# Patient Record
Sex: FEMALE | Race: WHITE | NOT HISPANIC OR LATINO | Employment: UNEMPLOYED | URBAN - NONMETROPOLITAN AREA
[De-identification: names, ages, dates, MRNs, and addresses within clinical notes are randomized per-mention and may not be internally consistent; named-entity substitution may affect disease eponyms.]

---
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
## 2021-01-06 ENCOUNTER — POSTPARTUM VISIT (OUTPATIENT)
Dept: OBSTETRICS AND GYNECOLOGY | Facility: CLINIC | Age: 21
End: 2021-01-06

## 2021-01-06 VITALS
SYSTOLIC BLOOD PRESSURE: 118 MMHG | HEIGHT: 63 IN | BODY MASS INDEX: 34.45 KG/M2 | WEIGHT: 194.4 LBS | DIASTOLIC BLOOD PRESSURE: 72 MMHG

## 2021-01-06 DIAGNOSIS — Z30.011 VISIT FOR ORAL CONTRACEPTIVE PRESCRIPTION: ICD-10-CM

## 2021-01-06 PROCEDURE — 0503F POSTPARTUM CARE VISIT: CPT | Performed by: FAMILY MEDICINE

## 2021-01-06 RX ORDER — NORGESTIMATE AND ETHINYL ESTRADIOL 7DAYSX3 LO
1 KIT ORAL DAILY
Qty: 28 TABLET | Refills: 12 | Status: SHIPPED | OUTPATIENT
Start: 2021-01-06 | End: 2021-07-02 | Stop reason: RX

## 2021-01-06 NOTE — PROGRESS NOTES
"4 week postpartum visit      Moriah Lazar is a 20 y.o.  s/p  on 20, who presents today for a 4 week postpartum check.  The patient states she is doing well.  Patient denies postpartum depression.  Menstrual cycles have not resumed. Lochia has stopped. Now formula feeding.  Desires OCP for contraception.  She has not resumed sexual intercourse.  Denies bowel or bladder issues.    PHYSICAL EXAM:    /72   Ht 160 cm (63\")   Wt 88.2 kg (194 lb 6.4 oz)   LMP 2020 (Approximate)   Breastfeeding No   BMI 34.44 kg/m²   GEN: A&Ox3, NAD  Resp: normal effort  Abdomen: +BS, benign, no masses, soft, non-tender.  Bimanual exam: pt deferred   Extremities: No deep calf tenderness.  Postpartum Depression Screening Questionnaire: 0, no treatment indicated.    IMPRESSION/PLAN:  20 y.o.  s/p term , 4 weeks postpartum.  Doing well.     Diagnosis Plan   1. Postpartum follow-up     2. Visit for oral contraceptive prescription         - Recovered nicely from her delivery  - Return to normal physical activity  - Contraception: OCP  - RTC in 2 weeks        This document has been electronically signed by Moriah Garcia MD on 2021 08:57 CST      "

## 2021-01-20 ENCOUNTER — POSTPARTUM VISIT (OUTPATIENT)
Dept: OBSTETRICS AND GYNECOLOGY | Facility: CLINIC | Age: 21
End: 2021-01-20

## 2021-01-20 VITALS
WEIGHT: 185.4 LBS | OXYGEN SATURATION: 99 % | SYSTOLIC BLOOD PRESSURE: 120 MMHG | RESPIRATION RATE: 20 BRPM | HEIGHT: 63 IN | HEART RATE: 90 BPM | DIASTOLIC BLOOD PRESSURE: 74 MMHG | BODY MASS INDEX: 32.85 KG/M2

## 2021-01-20 PROCEDURE — 0503F POSTPARTUM CARE VISIT: CPT | Performed by: FAMILY MEDICINE

## 2021-01-20 NOTE — PROGRESS NOTES
"6 week postpartum visit      Moriah Lazar is a 20 y.o.  s/p  on 20, who presents today for a 6 week postpartum check.  The patient states she is doing well, baby is now sleeping better at night.  Patient denies postpartum depression.  Menstrual cycles have not resumed.  Formula feeding.  Desires OCP for contraception.  She has not resumed sexual intercourse.  Denies bowel or bladder issues.    PHYSICAL EXAM:    /74 (BP Location: Left arm, Patient Position: Sitting, Cuff Size: Adult)   Pulse 90   Resp 20   Ht 160 cm (63\")   Wt 84.1 kg (185 lb 6.4 oz)   LMP 2020 (Approximate)   SpO2 99%   BMI 32.84 kg/m²   GEN: A&Ox3, NAD  Resp: normal effort  Abdomen: +BS, benign, no masses, soft, non-tender.  Bimanual exam: pt declined   Extremities: No deep calf tenderness.    IMPRESSION/PLAN:  20 y.o.  s/p term , 6 weeks postpartum.  Doing well.     Diagnosis Plan   1. Postpartum follow-up         - Recovered nicely from her delivery  - Return to normal physical activity  - Contraception: OCP  - Resume annual gynecological examinations        This document has been electronically signed by Moriah Garcia MD on 2021 10:40 CST        "

## 2021-05-03 ENCOUNTER — APPOINTMENT (OUTPATIENT)
Dept: MRI IMAGING | Facility: HOSPITAL | Age: 21
End: 2021-05-03

## 2021-05-03 ENCOUNTER — HOSPITAL ENCOUNTER (INPATIENT)
Facility: HOSPITAL | Age: 21
LOS: 3 days | Discharge: HOME | End: 2021-05-06
Attending: NEUROLOGICAL SURGERY | Admitting: NEUROLOGICAL SURGERY

## 2021-05-03 ENCOUNTER — HOSPITAL ENCOUNTER (EMERGENCY)
Facility: HOSPITAL | Age: 21
Discharge: ANOTHER HOSPITAL | End: 2021-05-03
Attending: EMERGENCY MEDICINE | Admitting: EMERGENCY MEDICINE

## 2021-05-03 VITALS
HEART RATE: 74 BPM | TEMPERATURE: 98 F | HEIGHT: 64 IN | SYSTOLIC BLOOD PRESSURE: 123 MMHG | BODY MASS INDEX: 32.33 KG/M2 | DIASTOLIC BLOOD PRESSURE: 69 MMHG | RESPIRATION RATE: 18 BRPM | OXYGEN SATURATION: 98 %

## 2021-05-03 DIAGNOSIS — M48.00 SPINAL STENOSIS, UNSPECIFIED SPINAL REGION: Primary | ICD-10-CM

## 2021-05-03 DIAGNOSIS — M54.42 BILATERAL LOW BACK PAIN WITH BILATERAL SCIATICA, UNSPECIFIED CHRONICITY: ICD-10-CM

## 2021-05-03 DIAGNOSIS — M54.41 BILATERAL LOW BACK PAIN WITH BILATERAL SCIATICA, UNSPECIFIED CHRONICITY: ICD-10-CM

## 2021-05-03 DIAGNOSIS — M51.26 LUMBAR DISC HERNIATION: Primary | ICD-10-CM

## 2021-05-03 DIAGNOSIS — Z78.9 DECREASED ACTIVITIES OF DAILY LIVING (ADL): ICD-10-CM

## 2021-05-03 DIAGNOSIS — Z74.09 IMPAIRED MOBILITY: ICD-10-CM

## 2021-05-03 DIAGNOSIS — R20.2 PARESTHESIA OF BOTH LOWER EXTREMITIES: ICD-10-CM

## 2021-05-03 LAB
ALBUMIN SERPL-MCNC: 4.1 G/DL (ref 3.5–5.2)
ALBUMIN/GLOB SERPL: 1.5 G/DL
ALP SERPL-CCNC: 106 U/L (ref 39–117)
ALT SERPL W P-5'-P-CCNC: 16 U/L (ref 1–33)
ANION GAP SERPL CALCULATED.3IONS-SCNC: 11 MMOL/L (ref 5–15)
AST SERPL-CCNC: 17 U/L (ref 1–32)
B-HCG UR QL: NEGATIVE
BASOPHILS # BLD AUTO: 0.05 10*3/MM3 (ref 0–0.2)
BASOPHILS NFR BLD AUTO: 0.5 % (ref 0–1.5)
BILIRUB SERPL-MCNC: 0.3 MG/DL (ref 0–1.2)
BILIRUB UR QL STRIP: NEGATIVE
BUN SERPL-MCNC: 10 MG/DL (ref 6–20)
BUN/CREAT SERPL: 14.5 (ref 7–25)
CALCIUM SPEC-SCNC: 9.4 MG/DL (ref 8.6–10.5)
CHLORIDE SERPL-SCNC: 104 MMOL/L (ref 98–107)
CLARITY UR: CLEAR
CO2 SERPL-SCNC: 20 MMOL/L (ref 22–29)
COLOR UR: YELLOW
CREAT SERPL-MCNC: 0.69 MG/DL (ref 0.57–1)
DEPRECATED RDW RBC AUTO: 41.1 FL (ref 37–54)
EOSINOPHIL # BLD AUTO: 0.19 10*3/MM3 (ref 0–0.4)
EOSINOPHIL NFR BLD AUTO: 1.9 % (ref 0.3–6.2)
ERYTHROCYTE [DISTWIDTH] IN BLOOD BY AUTOMATED COUNT: 14.5 % (ref 12.3–15.4)
FLUAV RNA RESP QL NAA+PROBE: NOT DETECTED
FLUBV RNA RESP QL NAA+PROBE: NOT DETECTED
GFR SERPL CREATININE-BSD FRML MDRD: 108 ML/MIN/1.73
GLOBULIN UR ELPH-MCNC: 2.8 GM/DL
GLUCOSE SERPL-MCNC: 91 MG/DL (ref 65–99)
GLUCOSE UR STRIP-MCNC: NEGATIVE MG/DL
HCT VFR BLD AUTO: 40 % (ref 34–46.6)
HGB BLD-MCNC: 13.3 G/DL (ref 12–15.9)
HGB UR QL STRIP.AUTO: NEGATIVE
HOLD SPECIMEN: NORMAL
IMM GRANULOCYTES # BLD AUTO: 0.03 10*3/MM3 (ref 0–0.05)
IMM GRANULOCYTES NFR BLD AUTO: 0.3 % (ref 0–0.5)
KETONES UR QL STRIP: NEGATIVE
LEUKOCYTE ESTERASE UR QL STRIP.AUTO: NEGATIVE
LYMPHOCYTES # BLD AUTO: 3 10*3/MM3 (ref 0.7–3.1)
LYMPHOCYTES NFR BLD AUTO: 30.7 % (ref 19.6–45.3)
MCH RBC QN AUTO: 26.1 PG (ref 26.6–33)
MCHC RBC AUTO-ENTMCNC: 33.3 G/DL (ref 31.5–35.7)
MCV RBC AUTO: 78.4 FL (ref 79–97)
MONOCYTES # BLD AUTO: 0.75 10*3/MM3 (ref 0.1–0.9)
MONOCYTES NFR BLD AUTO: 7.7 % (ref 5–12)
NEUTROPHILS NFR BLD AUTO: 5.74 10*3/MM3 (ref 1.7–7)
NEUTROPHILS NFR BLD AUTO: 58.9 % (ref 42.7–76)
NITRITE UR QL STRIP: NEGATIVE
NRBC BLD AUTO-RTO: 0 /100 WBC (ref 0–0.2)
PH UR STRIP.AUTO: 6 [PH] (ref 5–9)
PLATELET # BLD AUTO: 316 10*3/MM3 (ref 140–450)
PMV BLD AUTO: 9.6 FL (ref 6–12)
POTASSIUM SERPL-SCNC: 3.9 MMOL/L (ref 3.5–5.2)
PROT SERPL-MCNC: 6.9 G/DL (ref 6–8.5)
PROT UR QL STRIP: NEGATIVE
RBC # BLD AUTO: 5.1 10*6/MM3 (ref 3.77–5.28)
SARS-COV-2 RNA RESP QL NAA+PROBE: NOT DETECTED
SODIUM SERPL-SCNC: 135 MMOL/L (ref 136–145)
SP GR UR STRIP: 1.03 (ref 1–1.03)
UROBILINOGEN UR QL STRIP: NORMAL
WBC # BLD AUTO: 9.76 10*3/MM3 (ref 3.4–10.8)
WHOLE BLOOD HOLD SPECIMEN: NORMAL

## 2021-05-03 PROCEDURE — 96376 TX/PRO/DX INJ SAME DRUG ADON: CPT

## 2021-05-03 PROCEDURE — 25010000002 DIPHENHYDRAMINE PER 50 MG

## 2021-05-03 PROCEDURE — 25010000002 ONDANSETRON PER 1 MG: Performed by: EMERGENCY MEDICINE

## 2021-05-03 PROCEDURE — 25010000002 DEXAMETHASONE PER 1 MG: Performed by: NURSE PRACTITIONER

## 2021-05-03 PROCEDURE — 80053 COMPREHEN METABOLIC PANEL: CPT | Performed by: EMERGENCY MEDICINE

## 2021-05-03 PROCEDURE — 81003 URINALYSIS AUTO W/O SCOPE: CPT | Performed by: EMERGENCY MEDICINE

## 2021-05-03 PROCEDURE — 81025 URINE PREGNANCY TEST: CPT | Performed by: EMERGENCY MEDICINE

## 2021-05-03 PROCEDURE — 25010000002 KETOROLAC TROMETHAMINE PER 15 MG: Performed by: EMERGENCY MEDICINE

## 2021-05-03 PROCEDURE — 94799 UNLISTED PULMONARY SVC/PX: CPT

## 2021-05-03 PROCEDURE — 99223 1ST HOSP IP/OBS HIGH 75: CPT | Performed by: NURSE PRACTITIONER

## 2021-05-03 PROCEDURE — 25010000002 HYDROMORPHONE 1 MG/ML SOLUTION: Performed by: EMERGENCY MEDICINE

## 2021-05-03 PROCEDURE — 25010000002 METHYLPREDNISOLONE PER 125 MG: Performed by: EMERGENCY MEDICINE

## 2021-05-03 PROCEDURE — 96374 THER/PROPH/DIAG INJ IV PUSH: CPT

## 2021-05-03 PROCEDURE — 96375 TX/PRO/DX INJ NEW DRUG ADDON: CPT

## 2021-05-03 PROCEDURE — 85025 COMPLETE CBC W/AUTO DIFF WBC: CPT | Performed by: EMERGENCY MEDICINE

## 2021-05-03 PROCEDURE — 25010000002 MORPHINE SULFATE (PF) 2 MG/ML SOLUTION: Performed by: NEUROLOGICAL SURGERY

## 2021-05-03 PROCEDURE — 72148 MRI LUMBAR SPINE W/O DYE: CPT

## 2021-05-03 PROCEDURE — 99284 EMERGENCY DEPT VISIT MOD MDM: CPT

## 2021-05-03 PROCEDURE — 87636 SARSCOV2 & INF A&B AMP PRB: CPT | Performed by: EMERGENCY MEDICINE

## 2021-05-03 RX ORDER — HYDROCODONE BITARTRATE AND ACETAMINOPHEN 7.5; 325 MG/1; MG/1
1 TABLET ORAL EVERY 4 HOURS PRN
Status: DISCONTINUED | OUTPATIENT
Start: 2021-05-03 | End: 2021-05-06 | Stop reason: HOSPADM

## 2021-05-03 RX ORDER — CARISOPRODOL 350 MG/1
350 TABLET ORAL EVERY 8 HOURS PRN
Status: DISCONTINUED | OUTPATIENT
Start: 2021-05-03 | End: 2021-05-06 | Stop reason: HOSPADM

## 2021-05-03 RX ORDER — ACETAMINOPHEN 160 MG/5ML
650 SOLUTION ORAL EVERY 4 HOURS PRN
Status: DISCONTINUED | OUTPATIENT
Start: 2021-05-03 | End: 2021-05-06 | Stop reason: HOSPADM

## 2021-05-03 RX ORDER — METHYLPREDNISOLONE SODIUM SUCCINATE 125 MG/2ML
125 INJECTION, POWDER, LYOPHILIZED, FOR SOLUTION INTRAMUSCULAR; INTRAVENOUS ONCE
Status: COMPLETED | OUTPATIENT
Start: 2021-05-03 | End: 2021-05-03

## 2021-05-03 RX ORDER — LANOLIN ALCOHOL/MO/W.PET/CERES
6 CREAM (GRAM) TOPICAL NIGHTLY PRN
Status: DISCONTINUED | OUTPATIENT
Start: 2021-05-03 | End: 2021-05-06 | Stop reason: HOSPADM

## 2021-05-03 RX ORDER — DIPHENHYDRAMINE HCL 25 MG
25 CAPSULE ORAL EVERY 6 HOURS PRN
Status: DISCONTINUED | OUTPATIENT
Start: 2021-05-03 | End: 2021-05-06 | Stop reason: HOSPADM

## 2021-05-03 RX ORDER — PRENATAL VIT/IRON FUM/FOLIC AC 27MG-0.8MG
1 TABLET ORAL DAILY
Status: DISCONTINUED | OUTPATIENT
Start: 2021-05-03 | End: 2021-05-06 | Stop reason: HOSPADM

## 2021-05-03 RX ORDER — ONDANSETRON 2 MG/ML
4 INJECTION INTRAMUSCULAR; INTRAVENOUS EVERY 6 HOURS PRN
Status: DISCONTINUED | OUTPATIENT
Start: 2021-05-03 | End: 2021-05-03 | Stop reason: SDUPTHER

## 2021-05-03 RX ORDER — KETOROLAC TROMETHAMINE 30 MG/ML
30 INJECTION, SOLUTION INTRAMUSCULAR; INTRAVENOUS ONCE
Status: COMPLETED | OUTPATIENT
Start: 2021-05-03 | End: 2021-05-03

## 2021-05-03 RX ORDER — NICOTINE 21 MG/24HR
1 PATCH, TRANSDERMAL 24 HOURS TRANSDERMAL
Status: DISCONTINUED | OUTPATIENT
Start: 2021-05-03 | End: 2021-05-06 | Stop reason: HOSPADM

## 2021-05-03 RX ORDER — MORPHINE SULFATE 2 MG/ML
1 INJECTION, SOLUTION INTRAMUSCULAR; INTRAVENOUS EVERY 4 HOURS PRN
Status: DISCONTINUED | OUTPATIENT
Start: 2021-05-03 | End: 2021-05-06 | Stop reason: ALTCHOICE

## 2021-05-03 RX ORDER — CHOLECALCIFEROL (VITAMIN D3) 125 MCG
5 CAPSULE ORAL NIGHTLY PRN
COMMUNITY
End: 2021-11-18

## 2021-05-03 RX ORDER — OXYCODONE AND ACETAMINOPHEN 10; 325 MG/1; MG/1
1 TABLET ORAL EVERY 4 HOURS PRN
Status: DISCONTINUED | OUTPATIENT
Start: 2021-05-03 | End: 2021-05-06 | Stop reason: HOSPADM

## 2021-05-03 RX ORDER — ACETAMINOPHEN 325 MG/1
650 TABLET ORAL EVERY 4 HOURS PRN
Status: DISCONTINUED | OUTPATIENT
Start: 2021-05-03 | End: 2021-05-06 | Stop reason: HOSPADM

## 2021-05-03 RX ORDER — ACETAMINOPHEN 325 MG/1
650 TABLET ORAL EVERY 4 HOURS PRN
Status: DISCONTINUED | OUTPATIENT
Start: 2021-05-03 | End: 2021-05-03 | Stop reason: SDUPTHER

## 2021-05-03 RX ORDER — SODIUM CHLORIDE 9 MG/ML
100 INJECTION, SOLUTION INTRAVENOUS CONTINUOUS
Status: DISCONTINUED | OUTPATIENT
Start: 2021-05-03 | End: 2021-05-06 | Stop reason: HOSPADM

## 2021-05-03 RX ORDER — KETOROLAC TROMETHAMINE 30 MG/ML
30 INJECTION, SOLUTION INTRAMUSCULAR; INTRAVENOUS EVERY 6 HOURS PRN
Status: DISCONTINUED | OUTPATIENT
Start: 2021-05-03 | End: 2021-05-06 | Stop reason: HOSPADM

## 2021-05-03 RX ORDER — ACETAMINOPHEN 650 MG/1
650 SUPPOSITORY RECTAL EVERY 4 HOURS PRN
Status: DISCONTINUED | OUTPATIENT
Start: 2021-05-03 | End: 2021-05-03 | Stop reason: SDUPTHER

## 2021-05-03 RX ORDER — SODIUM CHLORIDE 0.9 % (FLUSH) 0.9 %
10 SYRINGE (ML) INJECTION AS NEEDED
Status: DISCONTINUED | OUTPATIENT
Start: 2021-05-03 | End: 2021-05-03 | Stop reason: HOSPADM

## 2021-05-03 RX ORDER — ACETAMINOPHEN 160 MG/5ML
650 SOLUTION ORAL EVERY 4 HOURS PRN
Status: DISCONTINUED | OUTPATIENT
Start: 2021-05-03 | End: 2021-05-03 | Stop reason: SDUPTHER

## 2021-05-03 RX ORDER — DIPHENHYDRAMINE HYDROCHLORIDE 50 MG/ML
25 INJECTION INTRAMUSCULAR; INTRAVENOUS ONCE
Status: COMPLETED | OUTPATIENT
Start: 2021-05-03 | End: 2021-05-03

## 2021-05-03 RX ORDER — SODIUM CHLORIDE 0.9 % (FLUSH) 0.9 %
10 SYRINGE (ML) INJECTION AS NEEDED
Status: DISCONTINUED | OUTPATIENT
Start: 2021-05-03 | End: 2021-05-06 | Stop reason: HOSPADM

## 2021-05-03 RX ORDER — SODIUM CHLORIDE 0.9 % (FLUSH) 0.9 %
10 SYRINGE (ML) INJECTION EVERY 12 HOURS SCHEDULED
Status: DISCONTINUED | OUTPATIENT
Start: 2021-05-03 | End: 2021-05-06 | Stop reason: SDUPTHER

## 2021-05-03 RX ORDER — SODIUM CHLORIDE 9 MG/ML
75 INJECTION, SOLUTION INTRAVENOUS CONTINUOUS
Status: DISCONTINUED | OUTPATIENT
Start: 2021-05-03 | End: 2021-05-06 | Stop reason: SDUPTHER

## 2021-05-03 RX ORDER — DEXAMETHASONE SODIUM PHOSPHATE 4 MG/ML
6 INJECTION, SOLUTION INTRA-ARTICULAR; INTRALESIONAL; INTRAMUSCULAR; INTRAVENOUS; SOFT TISSUE EVERY 6 HOURS
Status: DISCONTINUED | OUTPATIENT
Start: 2021-05-03 | End: 2021-05-06 | Stop reason: HOSPADM

## 2021-05-03 RX ORDER — NALOXONE HCL 0.4 MG/ML
0.4 VIAL (ML) INJECTION
Status: DISCONTINUED | OUTPATIENT
Start: 2021-05-03 | End: 2021-05-06 | Stop reason: HOSPADM

## 2021-05-03 RX ORDER — ACETAMINOPHEN 650 MG/1
650 SUPPOSITORY RECTAL EVERY 4 HOURS PRN
Status: DISCONTINUED | OUTPATIENT
Start: 2021-05-03 | End: 2021-05-06 | Stop reason: HOSPADM

## 2021-05-03 RX ORDER — DIPHENHYDRAMINE HYDROCHLORIDE 50 MG/ML
INJECTION INTRAMUSCULAR; INTRAVENOUS
Status: COMPLETED
Start: 2021-05-03 | End: 2021-05-03

## 2021-05-03 RX ORDER — SODIUM CHLORIDE 0.9 % (FLUSH) 0.9 %
10 SYRINGE (ML) INJECTION AS NEEDED
Status: DISCONTINUED | OUTPATIENT
Start: 2021-05-03 | End: 2021-05-06 | Stop reason: SDUPTHER

## 2021-05-03 RX ORDER — ONDANSETRON 2 MG/ML
4 INJECTION INTRAMUSCULAR; INTRAVENOUS EVERY 6 HOURS PRN
Status: DISCONTINUED | OUTPATIENT
Start: 2021-05-03 | End: 2021-05-06 | Stop reason: SDUPTHER

## 2021-05-03 RX ORDER — NALOXONE HCL 0.4 MG/ML
0.4 VIAL (ML) INJECTION
Status: DISCONTINUED | OUTPATIENT
Start: 2021-05-03 | End: 2021-05-06 | Stop reason: SDUPTHER

## 2021-05-03 RX ORDER — MORPHINE SULFATE 2 MG/ML
2 INJECTION, SOLUTION INTRAMUSCULAR; INTRAVENOUS EVERY 4 HOURS PRN
Status: DISCONTINUED | OUTPATIENT
Start: 2021-05-03 | End: 2021-05-06 | Stop reason: ALTCHOICE

## 2021-05-03 RX ORDER — SODIUM CHLORIDE 0.9 % (FLUSH) 0.9 %
10 SYRINGE (ML) INJECTION EVERY 12 HOURS SCHEDULED
Status: DISCONTINUED | OUTPATIENT
Start: 2021-05-03 | End: 2021-05-06 | Stop reason: HOSPADM

## 2021-05-03 RX ORDER — ONDANSETRON 2 MG/ML
4 INJECTION INTRAMUSCULAR; INTRAVENOUS ONCE
Status: COMPLETED | OUTPATIENT
Start: 2021-05-03 | End: 2021-05-03

## 2021-05-03 RX ORDER — DOCUSATE SODIUM 100 MG/1
100 CAPSULE, LIQUID FILLED ORAL 2 TIMES DAILY
Status: DISCONTINUED | OUTPATIENT
Start: 2021-05-03 | End: 2021-05-06 | Stop reason: HOSPADM

## 2021-05-03 RX ORDER — DEXAMETHASONE SODIUM PHOSPHATE 10 MG/ML
6 INJECTION, SOLUTION INTRAMUSCULAR; INTRAVENOUS EVERY 6 HOURS
Status: DISCONTINUED | OUTPATIENT
Start: 2021-05-03 | End: 2021-05-03 | Stop reason: SDUPTHER

## 2021-05-03 RX ORDER — ONDANSETRON 4 MG/1
4 TABLET, FILM COATED ORAL EVERY 6 HOURS PRN
Status: DISCONTINUED | OUTPATIENT
Start: 2021-05-03 | End: 2021-05-06 | Stop reason: HOSPADM

## 2021-05-03 RX ADMIN — ONDANSETRON HYDROCHLORIDE 4 MG: 2 INJECTION, SOLUTION INTRAMUSCULAR; INTRAVENOUS at 09:37

## 2021-05-03 RX ADMIN — PRENATAL VIT W/ FE FUMARATE-FA TAB 27-0.8 MG 1 TABLET: 27-0.8 TAB at 16:46

## 2021-05-03 RX ADMIN — SODIUM CHLORIDE, PRESERVATIVE FREE 10 ML: 5 INJECTION INTRAVENOUS at 20:28

## 2021-05-03 RX ADMIN — METHYLPREDNISOLONE SODIUM SUCCINATE 125 MG: 125 INJECTION, POWDER, FOR SOLUTION INTRAMUSCULAR; INTRAVENOUS at 11:30

## 2021-05-03 RX ADMIN — DEXAMETHASONE SODIUM PHOSPHATE 6 MG: 4 INJECTION, SOLUTION INTRA-ARTICULAR; INTRALESIONAL; INTRAMUSCULAR; INTRAVENOUS; SOFT TISSUE at 23:59

## 2021-05-03 RX ADMIN — HYDROMORPHONE HYDROCHLORIDE 1 MG: 1 INJECTION, SOLUTION INTRAMUSCULAR; INTRAVENOUS; SUBCUTANEOUS at 09:37

## 2021-05-03 RX ADMIN — HYDROMORPHONE HYDROCHLORIDE 1 MG: 1 INJECTION, SOLUTION INTRAMUSCULAR; INTRAVENOUS; SUBCUTANEOUS at 11:20

## 2021-05-03 RX ADMIN — NICOTINE 1 PATCH: 21 PATCH, EXTENDED RELEASE TRANSDERMAL at 16:49

## 2021-05-03 RX ADMIN — OXYCODONE HYDROCHLORIDE AND ACETAMINOPHEN 1 TABLET: 10; 325 TABLET ORAL at 20:36

## 2021-05-03 RX ADMIN — DIPHENHYDRAMINE HYDROCHLORIDE 25 MG: 50 INJECTION INTRAMUSCULAR; INTRAVENOUS at 12:48

## 2021-05-03 RX ADMIN — DEXAMETHASONE SODIUM PHOSPHATE 6 MG: 4 INJECTION, SOLUTION INTRA-ARTICULAR; INTRALESIONAL; INTRAMUSCULAR; INTRAVENOUS; SOFT TISSUE at 16:47

## 2021-05-03 RX ADMIN — DOCUSATE SODIUM 100 MG: 100 CAPSULE ORAL at 20:28

## 2021-05-03 RX ADMIN — KETOROLAC TROMETHAMINE 30 MG: 30 INJECTION, SOLUTION INTRAMUSCULAR; INTRAVENOUS at 11:19

## 2021-05-03 RX ADMIN — SODIUM CHLORIDE 75 ML/HR: 9 INJECTION, SOLUTION INTRAVENOUS at 16:02

## 2021-05-03 RX ADMIN — HYDROCODONE BITARTRATE AND ACETAMINOPHEN 1 TABLET: 7.5; 325 TABLET ORAL at 16:02

## 2021-05-03 RX ADMIN — MORPHINE SULFATE 2 MG: 2 INJECTION, SOLUTION INTRAMUSCULAR; INTRAVENOUS at 16:56

## 2021-05-03 RX ADMIN — CARISOPRODOL 350 MG: 350 TABLET ORAL at 16:55

## 2021-05-04 PROCEDURE — 97166 OT EVAL MOD COMPLEX 45 MIN: CPT

## 2021-05-04 PROCEDURE — 25010000002 DEXAMETHASONE PER 1 MG: Performed by: NURSE PRACTITIONER

## 2021-05-04 PROCEDURE — 99231 SBSQ HOSP IP/OBS SF/LOW 25: CPT | Performed by: NEUROLOGICAL SURGERY

## 2021-05-04 PROCEDURE — 97530 THERAPEUTIC ACTIVITIES: CPT

## 2021-05-04 PROCEDURE — 97161 PT EVAL LOW COMPLEX 20 MIN: CPT | Performed by: PHYSICAL THERAPIST

## 2021-05-04 PROCEDURE — 63710000001 DIPHENHYDRAMINE PER 50 MG: Performed by: NEUROLOGICAL SURGERY

## 2021-05-04 PROCEDURE — 25010000002 KETOROLAC TROMETHAMINE PER 15 MG: Performed by: NEUROLOGICAL SURGERY

## 2021-05-04 RX ADMIN — Medication 6 MG: at 21:14

## 2021-05-04 RX ADMIN — DEXAMETHASONE SODIUM PHOSPHATE 6 MG: 4 INJECTION, SOLUTION INTRA-ARTICULAR; INTRALESIONAL; INTRAMUSCULAR; INTRAVENOUS; SOFT TISSUE at 11:22

## 2021-05-04 RX ADMIN — DOCUSATE SODIUM 100 MG: 100 CAPSULE ORAL at 08:09

## 2021-05-04 RX ADMIN — OXYCODONE HYDROCHLORIDE AND ACETAMINOPHEN 1 TABLET: 10; 325 TABLET ORAL at 10:13

## 2021-05-04 RX ADMIN — DEXAMETHASONE SODIUM PHOSPHATE 6 MG: 4 INJECTION, SOLUTION INTRA-ARTICULAR; INTRALESIONAL; INTRAMUSCULAR; INTRAVENOUS; SOFT TISSUE at 22:27

## 2021-05-04 RX ADMIN — SODIUM CHLORIDE 100 ML/HR: 9 INJECTION, SOLUTION INTRAVENOUS at 04:23

## 2021-05-04 RX ADMIN — KETOROLAC TROMETHAMINE 30 MG: 30 INJECTION, SOLUTION INTRAMUSCULAR; INTRAVENOUS at 16:09

## 2021-05-04 RX ADMIN — OXYCODONE HYDROCHLORIDE AND ACETAMINOPHEN 1 TABLET: 10; 325 TABLET ORAL at 05:38

## 2021-05-04 RX ADMIN — OXYCODONE HYDROCHLORIDE AND ACETAMINOPHEN 1 TABLET: 10; 325 TABLET ORAL at 21:13

## 2021-05-04 RX ADMIN — PRENATAL VIT W/ FE FUMARATE-FA TAB 27-0.8 MG 1 TABLET: 27-0.8 TAB at 08:09

## 2021-05-04 RX ADMIN — DEXAMETHASONE SODIUM PHOSPHATE 6 MG: 4 INJECTION, SOLUTION INTRA-ARTICULAR; INTRALESIONAL; INTRAMUSCULAR; INTRAVENOUS; SOFT TISSUE at 17:35

## 2021-05-04 RX ADMIN — CARISOPRODOL 350 MG: 350 TABLET ORAL at 17:35

## 2021-05-04 RX ADMIN — DOCUSATE SODIUM 100 MG: 100 CAPSULE ORAL at 21:13

## 2021-05-04 RX ADMIN — OXYCODONE HYDROCHLORIDE AND ACETAMINOPHEN 1 TABLET: 10; 325 TABLET ORAL at 14:13

## 2021-05-04 RX ADMIN — KETOROLAC TROMETHAMINE 30 MG: 30 INJECTION, SOLUTION INTRAMUSCULAR; INTRAVENOUS at 04:28

## 2021-05-04 RX ADMIN — DEXAMETHASONE SODIUM PHOSPHATE 6 MG: 4 INJECTION, SOLUTION INTRA-ARTICULAR; INTRALESIONAL; INTRAMUSCULAR; INTRAVENOUS; SOFT TISSUE at 04:28

## 2021-05-04 RX ADMIN — CARISOPRODOL 350 MG: 350 TABLET ORAL at 08:49

## 2021-05-04 RX ADMIN — DIPHENHYDRAMINE HYDROCHLORIDE 25 MG: 25 CAPSULE ORAL at 22:27

## 2021-05-05 PROCEDURE — 25010000002 DEXAMETHASONE PER 1 MG: Performed by: NURSE PRACTITIONER

## 2021-05-05 PROCEDURE — 97530 THERAPEUTIC ACTIVITIES: CPT

## 2021-05-05 PROCEDURE — 99231 SBSQ HOSP IP/OBS SF/LOW 25: CPT | Performed by: NURSE PRACTITIONER

## 2021-05-05 PROCEDURE — 97535 SELF CARE MNGMENT TRAINING: CPT

## 2021-05-05 PROCEDURE — 25010000002 KETOROLAC TROMETHAMINE PER 15 MG: Performed by: NEUROLOGICAL SURGERY

## 2021-05-05 RX ORDER — ALPRAZOLAM 0.25 MG/1
0.25 TABLET ORAL 3 TIMES DAILY PRN
Status: DISCONTINUED | OUTPATIENT
Start: 2021-05-05 | End: 2021-05-06 | Stop reason: HOSPADM

## 2021-05-05 RX ADMIN — KETOROLAC TROMETHAMINE 30 MG: 30 INJECTION, SOLUTION INTRAMUSCULAR; INTRAVENOUS at 03:29

## 2021-05-05 RX ADMIN — DOCUSATE SODIUM 100 MG: 100 CAPSULE ORAL at 23:23

## 2021-05-05 RX ADMIN — PRENATAL VIT W/ FE FUMARATE-FA TAB 27-0.8 MG 1 TABLET: 27-0.8 TAB at 08:29

## 2021-05-05 RX ADMIN — OXYCODONE HYDROCHLORIDE AND ACETAMINOPHEN 1 TABLET: 10; 325 TABLET ORAL at 13:17

## 2021-05-05 RX ADMIN — NICOTINE 1 PATCH: 21 PATCH, EXTENDED RELEASE TRANSDERMAL at 08:29

## 2021-05-05 RX ADMIN — DEXAMETHASONE SODIUM PHOSPHATE 6 MG: 4 INJECTION, SOLUTION INTRA-ARTICULAR; INTRALESIONAL; INTRAMUSCULAR; INTRAVENOUS; SOFT TISSUE at 23:22

## 2021-05-05 RX ADMIN — ALPRAZOLAM 0.25 MG: 0.25 TABLET ORAL at 08:29

## 2021-05-05 RX ADMIN — OXYCODONE HYDROCHLORIDE AND ACETAMINOPHEN 1 TABLET: 10; 325 TABLET ORAL at 19:03

## 2021-05-05 RX ADMIN — Medication 6 MG: at 23:22

## 2021-05-05 RX ADMIN — DEXAMETHASONE SODIUM PHOSPHATE 6 MG: 4 INJECTION, SOLUTION INTRA-ARTICULAR; INTRALESIONAL; INTRAMUSCULAR; INTRAVENOUS; SOFT TISSUE at 05:11

## 2021-05-05 RX ADMIN — CARISOPRODOL 350 MG: 350 TABLET ORAL at 03:30

## 2021-05-05 RX ADMIN — OXYCODONE HYDROCHLORIDE AND ACETAMINOPHEN 1 TABLET: 10; 325 TABLET ORAL at 05:11

## 2021-05-05 RX ADMIN — DEXAMETHASONE SODIUM PHOSPHATE 6 MG: 4 INJECTION, SOLUTION INTRA-ARTICULAR; INTRALESIONAL; INTRAMUSCULAR; INTRAVENOUS; SOFT TISSUE at 10:58

## 2021-05-05 RX ADMIN — CARISOPRODOL 350 MG: 350 TABLET ORAL at 13:17

## 2021-05-05 RX ADMIN — DOCUSATE SODIUM 100 MG: 100 CAPSULE ORAL at 08:29

## 2021-05-05 RX ADMIN — DEXAMETHASONE SODIUM PHOSPHATE 6 MG: 4 INJECTION, SOLUTION INTRA-ARTICULAR; INTRALESIONAL; INTRAMUSCULAR; INTRAVENOUS; SOFT TISSUE at 17:36

## 2021-05-05 RX ADMIN — ALPRAZOLAM 0.25 MG: 0.25 TABLET ORAL at 23:23

## 2021-05-05 RX ADMIN — OXYCODONE HYDROCHLORIDE AND ACETAMINOPHEN 1 TABLET: 10; 325 TABLET ORAL at 23:22

## 2021-05-05 RX ADMIN — OXYCODONE HYDROCHLORIDE AND ACETAMINOPHEN 1 TABLET: 10; 325 TABLET ORAL at 09:20

## 2021-05-06 VITALS
TEMPERATURE: 97.9 F | BODY MASS INDEX: 32.33 KG/M2 | WEIGHT: 189.4 LBS | HEART RATE: 57 BPM | OXYGEN SATURATION: 95 % | SYSTOLIC BLOOD PRESSURE: 110 MMHG | HEIGHT: 64 IN | RESPIRATION RATE: 16 BRPM | DIASTOLIC BLOOD PRESSURE: 64 MMHG

## 2021-05-06 PROCEDURE — 25010000002 DEXAMETHASONE PER 1 MG: Performed by: NURSE PRACTITIONER

## 2021-05-06 PROCEDURE — 99239 HOSP IP/OBS DSCHRG MGMT >30: CPT | Performed by: NURSE PRACTITIONER

## 2021-05-06 PROCEDURE — 97116 GAIT TRAINING THERAPY: CPT

## 2021-05-06 PROCEDURE — 97530 THERAPEUTIC ACTIVITIES: CPT

## 2021-05-06 RX ORDER — OXYCODONE AND ACETAMINOPHEN 10; 325 MG/1; MG/1
1 TABLET ORAL EVERY 6 HOURS PRN
Qty: 60 TABLET | Refills: 0 | Status: SHIPPED | OUTPATIENT
Start: 2021-05-06 | End: 2021-05-20

## 2021-05-06 RX ORDER — CYCLOBENZAPRINE HCL 10 MG
10 TABLET ORAL 3 TIMES DAILY PRN
Qty: 90 TABLET | Refills: 2 | Status: SHIPPED | OUTPATIENT
Start: 2021-05-06 | End: 2021-11-02 | Stop reason: SDUPTHER

## 2021-05-06 RX ORDER — METHYLPREDNISOLONE 4 MG/1
TABLET ORAL
Qty: 21 EACH | Refills: 0 | Status: SHIPPED | OUTPATIENT
Start: 2021-05-06 | End: 2021-05-26

## 2021-05-06 RX ADMIN — OXYCODONE HYDROCHLORIDE AND ACETAMINOPHEN 1 TABLET: 10; 325 TABLET ORAL at 07:01

## 2021-05-06 RX ADMIN — DEXAMETHASONE SODIUM PHOSPHATE 6 MG: 4 INJECTION, SOLUTION INTRA-ARTICULAR; INTRALESIONAL; INTRAMUSCULAR; INTRAVENOUS; SOFT TISSUE at 07:01

## 2021-05-06 RX ADMIN — DOCUSATE SODIUM 100 MG: 100 CAPSULE ORAL at 08:02

## 2021-05-06 RX ADMIN — SODIUM CHLORIDE, PRESERVATIVE FREE 10 ML: 5 INJECTION INTRAVENOUS at 08:02

## 2021-05-06 RX ADMIN — DEXAMETHASONE SODIUM PHOSPHATE 6 MG: 4 INJECTION, SOLUTION INTRA-ARTICULAR; INTRALESIONAL; INTRAMUSCULAR; INTRAVENOUS; SOFT TISSUE at 10:38

## 2021-05-06 RX ADMIN — CARISOPRODOL 350 MG: 350 TABLET ORAL at 10:51

## 2021-05-06 RX ADMIN — SODIUM CHLORIDE 100 ML/HR: 9 INJECTION, SOLUTION INTRAVENOUS at 09:01

## 2021-05-06 RX ADMIN — PRENATAL VIT W/ FE FUMARATE-FA TAB 27-0.8 MG 1 TABLET: 27-0.8 TAB at 08:02

## 2021-05-07 ENCOUNTER — READMISSION MANAGEMENT (OUTPATIENT)
Dept: CALL CENTER | Facility: HOSPITAL | Age: 21
End: 2021-05-07

## 2021-05-07 NOTE — OUTREACH NOTE
Prep Survey      Responses   [facility] facility patient discharged from?  [place]   Is LACE score < 7 ?  No   Emergency Room discharge w/ pulse ox?  No   Eligibility  Readm Mgmt   Discharge diagnosis  Lumbar disc herniation, lumbar radiculopathy   Does the patient have one of the following disease processes/diagnoses(primary or secondary)?  Other   Does the patient have Home health ordered?  No   Is there a DME ordered?  No   Prep survey completed?  Yes          Dorothea Duke RN

## 2021-05-11 ENCOUNTER — READMISSION MANAGEMENT (OUTPATIENT)
Dept: CALL CENTER | Facility: HOSPITAL | Age: 21
End: 2021-05-11

## 2021-05-11 NOTE — OUTREACH NOTE
Medical Week 1 Survey      Responses   [facility] patient discharged from?  [place]   Does the patient have one of the following disease processes/diagnoses(primary or secondary)?  Other   Week 1 attempt successful?  Yes   Call start time  1306   Revoke  Decline to participate [Doing well]   Call end time  1307          Carol Meyer, RN

## 2021-05-12 ENCOUNTER — HOSPITAL ENCOUNTER (OUTPATIENT)
Dept: PHYSICAL THERAPY | Facility: HOSPITAL | Age: 21
Setting detail: THERAPIES SERIES
Discharge: HOME | End: 2021-05-12

## 2021-05-12 DIAGNOSIS — M51.26 LUMBAR DISC HERNIATION: Primary | ICD-10-CM

## 2021-05-12 PROCEDURE — 97161 PT EVAL LOW COMPLEX 20 MIN: CPT | Performed by: PHYSICAL THERAPIST

## 2021-05-12 NOTE — THERAPY EVALUATION
Outpatient Physical Therapy Ortho Initial Evaluation  [facility]     Patient Name: Moriah Nicole  : 2000  MRN: [id number]  Today's Date: 2021      Visit Date: 2021  Visit   Return to MD: ANY  Re-cert date: 21  Patient Active Problem List   Diagnosis   • Lumbar disc herniation        Past Medical History:   Diagnosis Date   • Postural hypotension         History reviewed. No pertinent surgical history.    Visit Dx:     ICD-10-CM ICD-9-CM   1. Lumbar disc herniation  M51.26 722.10       Medications (Admitted on 2021)     cyclobenzaprine (FLEXERIL) 10 MG tablet  melatonin 5 MG tablet tablet  methylPREDNISolone (MEDROL) 4 MG dose pack  norgestimate-ethinyl estradiol (Ortho Tri-Cyclen Lo) 0.18/0.215/0.25 MG-25 MCG per tablet  oxyCODONE-acetaminophen (Percocet)  MG per tablet  prenatal vitamin (prenatal, CLASSIC, vitamin) tablet     Allergies: NKA      PT Ortho     Row Name 21 1439       Subjective Comments    Subjective Comments  22 yo female with  2 week h/o acute onset LBP and B LE paresthesia, constant R LE sciatica, intermittent with the LLE. Was trying to get out of bed one night to feed her  and felt a sharp stabbing pain and was unable to stand upright at the time. Had sharp B LE sciatica the day of her injury. Since the injury, has had unchanged LBP and sciatica symptoms are unchanged as well with B LE's. Saw referring provider last week (APRN who works with Dr. Orellana, neurosurgeon in [place]). Aggravating factors: sitting or any duration, standing, bending over, climbing stairs-unable to climb down to basement to lower level to her bedroom, lifting small children. Easing factors: laying prone, ice, pain meds-muscle relaxers, Percocet; has had reports of R foot drag, clipping the R foot on the floor when walking. No falls.    -BS       Precautions and Contraindications    Precautions/Limitations  no known precautions/limitations  -BS        Subjective Pain    Able to rate subjective pain?  yes  -BS    Pre-Treatment Pain Level  7  -BS    Post-Treatment Pain Level  5  -BS    Subjective Pain Comment  7/10 LBP, 8/10 R>L LE-groin, post thighs  -BS       Quarter Clearing    Quarter Clearing  Lower Quarter Clearing  -BS       Sensory Screen for Light Touch- Lower Quarter Clearing    L1 (inguinal area)  Bilateral:;Diminished  -BS    L2 (anterior mid thigh)  Bilateral:;Diminished  -BS    L3 (distal anterior thigh)  Right:;Diminished;Left:;Intact  -BS    L4 (medial lower leg/foot)  Bilateral:;Intact  -BS    L5 (lateral lower leg/great toe)  Bilateral:;Intact  -BS    S1 (bottom of foot)  Right:;Diminished;Left:;Intact  -BS       Myotomal Screen- Lower Quarter Clearing    Hip flexion (L2)  Bilateral:;3+ (Fair +)  -BS    Knee extension (L3)  Bilateral:;3+ (Fair +)  -BS    Ankle DF (L4)  Bilateral:;3+ (Fair +)  -BS    Great toe extension (L5)  Bilateral:;3+ (Fair +)  -BS    Ankle PF (S1)  Bilateral:;3 (Fair)  -BS    Knee flexion (S2)  Right:;3+ (Fair +);Left:;4 (Good)  -BS       Lumbar ROM Screen- Lower Quarter Clearing    Lumbar Flexion  Impaired fingers to distal thigh region-severe limit  -BS    Lumbar Extension  Impaired severe limit-unable to extend past neutral   -BS    Lumbar Lateral Flexion  Impaired 50% mod limit B-R SB provokes L LE, L SB provokes R LE  -BS    Lumbar Rotation  Impaired 75% min limit B  -BS      User Key  (r) = Recorded By, (t) = Taken By, (c) = Cosigned By    Initials Name Provider Type    Amado Gutierrez, PT Physical Therapist                            PT OP Goals     Row Name 05/12/21 1400          PT Short Term Goals    STG Date to Achieve  05/26/21  -BS     STG 1  Pt independent with maintaining good sitting posture +/- lumbar support  -BS     STG 1 Progress  New  -BS     STG 2  Improve R LE MMT to 4/5  -BS     STG 2 Progress  New  -BS     STG 3  Reduce LBP by 50% with 50% reduction in B LE sciatica symptoms  -BS     STG 3 Progress   New  -BS     STG 4  Improve lumbar spine AROM to minimal limit 75%-(flex/ext)  -BS     STG 4 Progress  New  -BS        Long Term Goals    LTG Date to Achieve  06/09/21  -BS     LTG 1  Reduce LBP by 75% with 100% reduction in B LE sciatica symptoms  -BS     LTG 1 Progress  New  -BS     LTG 2  Improve B LE MMT to 4+/5   -BS     LTG 2 Progress  New  -BS     LTG 3  Improve lumbar spine AROM to WFL-all planes  -BS     LTG 3 Progress  New  -BS        Time Calculation    PT Goal Re-Cert Due Date  06/02/21  -BS       User Key  (r) = Recorded By, (t) = Taken By, (c) = Cosigned By    Initials Name Provider Type    BS Amado Real, PT Physical Therapist          PT Assessment/Plan     Row Name 05/12/21 1431          PT Assessment    Functional Limitations  Performance in work activities;Performance in leisure activities;Performance in self-care ADL;Performance in sport activities;Limitation in home management  -BS     Impairments  Gait;Muscle strength;Pain;Posture;Range of motion;Sensation;Poor body mechanics;Impaired flexibility;Endurance  -BS     Assessment Comments  Acute LBP with B LE sciatica due to a L4-5 disc herniation. Presents with R>L LE paresthesia, severe lumbar ROM deficit, R>L LE weakness and altered gait mechanics. Would benefit from skilled PT to reduce LBP/B LE pain, improve B LE strength, improve lumbar ROM and facilitate return to PLOF. Dramatic reduction in LBP and B LE sciatica symptoms with prone lying on 1 pillow and sitting with neutral spine alignment.   -BS     Please refer to paper survey for additional self-reported information  Yes  -BS     Rehab Potential  Good  -BS     Patient/caregiver participated in establishment of treatment plan and goals  Yes  -BS     Patient would benefit from skilled therapy intervention  Yes  -BS        PT Plan    PT Frequency  2x/week  -BS     Predicted Duration of Therapy Intervention (PT)  4-6 weeks  -BS     Planned CPT's?  PT EVAL LOW COMPLEXITY: 12011;PT RE-EVAL:  89429;PT THER PROC EA 15 MIN: 65726;PT MANUAL THERAPY EA 15 MIN: 41229;PT NEUROMUSC RE-EDUCATION EA 15 MIN: 08879;PT THER ACT EA 15 MIN: 17793;PT ELECTRICAL STIM UNATTEND: ;PT TRACTION LUMBAR: 90392;PT HOT/COLD PACK WC NONMCARE: 40893;PT THER SUPP EA 15 MIN  -BS     Physical Therapy Interventions (Optional Details)  balance training;home exercise program;joint mobilization;lumbar stabilization;manual therapy techniques;modalities;neuromuscular re-education;patient/family education;postural re-education;ROM (Range of Motion);strengthening;stretching  -BS     PT Plan Comments  Address prone progression-attempt prone lying on 2 pillow, NELSON. Add ice/IFC to lumbar spine as needed for pain management.   -BS       User Key  (r) = Recorded By, (t) = Taken By, (c) = Cosigned By    Initials Name Provider Type    BS Amado Real, PT Physical Therapist            OP Exercises     Row Name 21 3961             Subjective Comments    Subjective Comments  22 yo female with  2 week h/o acute onset LBP and B LE paresthesia, constant R LE sciatica, intermittent with the LLE. Was trying to get out of bed one night to feed her  and felt a sharp stabbing pain and was unable to stand upright at the time. Had sharp B LE sciatica the day of her injury. Since the injury, has had unchanged LBP and sciatica symptoms are unchanged as well with B LE's. Saw referring provider last week (DAVID who works with Dr. Orellana, neurosurgeon in [place]). Aggravating factors: sitting or any duration, standing, bending over, climbing stairs-unable to climb down to basement to lower level to her bedroom, lifting small children. Easing factors: laying prone, ice, pain meds-muscle relaxers, Percocet; has had reports of R foot drag, clipping the R foot on the floor when walking. No falls.    -BS         Subjective Pain    Able to rate subjective pain?  yes  -BS      Pre-Treatment Pain Level  7  -BS      Post-Treatment Pain Level  5  -BS       Subjective Pain Comment  7/10 LBP, 8/10 R>L LE-groin, post thighs  -BS         Exercise 1    Exercise Name 1  prone lying on 1 pillow  -BS      Additional Comments  reduced LBP, decreased B LE sciatica  -BS         Exercise 2    Exercise Name 2  pt ed sitting posture w/ lumbar support (towel roll)  -BS      Time 2  5'  -BS      Additional Comments  propped feet on Airex to maintain 90/90° alignment at B knees/B hips  -BS        User Key  (r) = Recorded By, (t) = Taken By, (c) = Cosigned By    Initials Name Provider Type    Amado Gutierrez, PT Physical Therapist                        Outcome Measure Options: Modifed Owestry  Modified Oswestry  Modified Oswestry Score/Comments: 31/50-62%      Time Calculation:     Start Time: 1439  Stop Time: 1524  Time Calculation (min): 45 min  Total Timed Code Minutes- PT: 45 minute(s)     Therapy Charges for Today     Code Description Service Date Service Provider Modifiers Qty    16496802842 HC PT EVAL LOW COMPLEXITY 3 5/12/2021 Amado Real, PT GP 1          PT G-Codes  Outcome Measure Options: Modifed Owestry  Modified Oswestry Score/Comments: 31/50-62%         Amado Real PT  5/12/2021

## 2021-05-25 ENCOUNTER — TELEPHONE (OUTPATIENT)
Dept: PHYSICAL THERAPY | Facility: HOSPITAL | Age: 21
End: 2021-05-25

## 2021-05-26 ENCOUNTER — OFFICE VISIT (OUTPATIENT)
Dept: NEUROSURGERY | Facility: CLINIC | Age: 21
End: 2021-05-26

## 2021-05-26 VITALS
SYSTOLIC BLOOD PRESSURE: 128 MMHG | HEIGHT: 64 IN | BODY MASS INDEX: 33.73 KG/M2 | DIASTOLIC BLOOD PRESSURE: 84 MMHG | WEIGHT: 197.6 LBS

## 2021-05-26 DIAGNOSIS — E66.09 CLASS 1 OBESITY DUE TO EXCESS CALORIES WITH BODY MASS INDEX (BMI) OF 34.0 TO 34.9 IN ADULT, UNSPECIFIED WHETHER SERIOUS COMORBIDITY PRESENT: ICD-10-CM

## 2021-05-26 DIAGNOSIS — Z87.891 FORMER SMOKER: ICD-10-CM

## 2021-05-26 DIAGNOSIS — M51.26 LUMBAR DISC HERNIATION: Primary | ICD-10-CM

## 2021-05-26 PROCEDURE — 99213 OFFICE O/P EST LOW 20 MIN: CPT | Performed by: NURSE PRACTITIONER

## 2021-05-26 NOTE — PROGRESS NOTES
"    Chief complaint:   Chief Complaint   Patient presents with   • Back Pain     oMriah is returning for follow up for her back pain, she has not done the physical therapy she doesn't feel she could tolerate it.          Subjective     HPI: This is a 21-year-old female patient who we saw in the hospital at the end of April for an acute onset of back pain and bilateral lower extremity pain. The patient was found to have a disc herniation causing central canal stenosis and bilateral foraminal narrowing at L4-5. The patient did struggle initially in the hospital stay due to pain but was able to become mobile and worked with physical therapy and was eventually able to be discharged home. We did want the patient to go through an outpatient dedicated course of physical therapy. She comes in today and states that she only went to 1 session and had increased pain and never did go back to do any more therapy. She has taking all of her pain medication. She is still taking muscle relaxers. Rates her pain on a scale 0-10 at an 8. She says he is very limited by the amount of pain that she has and it does interfere with her actives of daily living. She is not complaining of any pain into her left lower extremity and says the pain is in her back and going down into her right lower extremity in a radicular fashion to her foot. This pain is constant. She states that her pain has never gotten as bad as it did initially when she came into the hospital but states that her pain is not really any better from when she left the hospital    Review of Systems   Musculoskeletal: Positive for back pain.   Neurological: Positive for numbness. Negative for weakness.         Objective      Vital Signs  /84   Ht 161.3 cm (63.5\")   Wt 89.6 kg (197 lb 9.6 oz)   BMI 34.45 kg/m²     Physical Exam  Constitutional:       Appearance: She is well-developed.   HENT:      Head: Normocephalic.   Eyes:      Pupils: Pupils are equal, round, and " reactive to light.   Pulmonary:      Effort: Pulmonary effort is normal.   Musculoskeletal:         General: Normal range of motion.      Cervical back: Normal range of motion.      Comments: Back and right lower extremity pain   Skin:     General: Skin is warm.   Neurological:      Mental Status: She is alert and oriented to person, place, and time.      GCS: GCS eye subscore is 4. GCS verbal subscore is 5. GCS motor subscore is 6.      Cranial Nerves: No cranial nerve deficit.      Sensory: No sensory deficit.      Gait: Gait normal.      Deep Tendon Reflexes: Reflexes are normal and symmetric.   Psychiatric:         Speech: Speech normal.         Behavior: Behavior normal.         Thought Content: Thought content normal.         Neurologic Exam     Mental Status   Oriented to person, place, and time.   Speech: speech is normal     Cranial Nerves     CN III, IV, VI   Pupils are equal, round, and reactive to light.      Results Review: No new imaging        Assessment/Plan: At this point I am going recommend the patient go back to physical therapy and complete a dedicated course consisting of 2-3 times a week for 4 to 6 weeks. In addition to that we consider up to go to pain management to see about having injections in her back. We will make this referral to pain management center in [place]. I will have her follow-up with Dr. Orlelana after treatment is concluded for further evaluation and recommendation. They were told to call us if they had any further problems or concerns.    Patient is a nonsmoker  The patient's Body mass index is 34.45 kg/m².. BMI is above normal parameters. Recommendations include: educational material and nutrition counseling    Diagnoses and all orders for this visit:    1. Lumbar disc herniation (Primary)  -     Ambulatory Referral to Pain Management    2. Class 1 obesity due to excess calories with body mass index (BMI) of 34.0 to 34.9 in adult, unspecified whether serious comorbidity  present    3. Former smoker        I discussed the patients findings and my recommendations with patient  Demian Velez, APRN  05/26/21  15:48 CDT

## 2021-05-27 ENCOUNTER — HOSPITAL ENCOUNTER (OUTPATIENT)
Dept: PHYSICAL THERAPY | Facility: HOSPITAL | Age: 21
Setting detail: THERAPIES SERIES
Discharge: HOME | End: 2021-05-27

## 2021-05-27 DIAGNOSIS — M51.26 LUMBAR DISC HERNIATION: Primary | ICD-10-CM

## 2021-05-27 PROCEDURE — G0283 ELEC STIM OTHER THAN WOUND: HCPCS

## 2021-05-27 PROCEDURE — 97110 THERAPEUTIC EXERCISES: CPT

## 2021-05-27 NOTE — THERAPY TREATMENT NOTE
Outpatient Physical Therapy Ortho Treatment Note  [facility]     Patient Name: Moriah Nicole  : 2000  MRN: [id number]  Today's Date: 2021      Visit Date: 2021     Subjective Improvement 0  Visits  2/4  Visits approved 5 until 2021  RTMD 2021  Recert Date 2021      Visit Dx:    ICD-10-CM ICD-9-CM   1. Lumbar disc herniation  M51.26 722.10       Patient Active Problem List   Diagnosis   • Lumbar disc herniation   • Class 1 obesity due to excess calories with body mass index (BMI) of 34.0 to 34.9 in adult   • Former smoker        Past Medical History:   Diagnosis Date   • Postural hypotension         No past surgical history on file.    PT Ortho     Row Name 21 1300       Subjective Comments    Subjective Comments  Patient states that she has pain when sitting, walking and laying on back or on stomach.  She also has pain traveling down her right leg.    -CP       Subjective Pain    Able to rate subjective pain?  yes  -CP    Pre-Treatment Pain Level  8  -CP    Post-Treatment Pain Level  -- better  -CP      User Key  (r) = Recorded By, (t) = Taken By, (c) = Cosigned By    Initials Name Provider Type    CP Dora Capps, PTA Physical Therapy Assistant                      PT Assessment/Plan     Row Name 21 1314          PT Assessment    Assessment Comments  Patient presents with pain which does limit her ADLs.  She did have increase pain with ext based ex.  She reported decreaase pain with fl base ex.  -CP        PT Plan    PT Frequency  2x/week  -CP     Predicted Duration of Therapy Intervention (PT)  4-6 weeks  -CP     PT Plan Comments  Cont with POC.  LTR stretch as tolerated.  gentle core stab  -CP       User Key  (r) = Recorded By, (t) = Taken By, (c) = Cosigned By    Initials Name Provider Type    Dora Barron, PTA Physical Therapy Assistant          Modalities     Row Name 21 1300             Ice    Ice Applied  Yes  -CP      Location  " low back with IFC  -CP      PT Ice Rx Minutes  15  -CP      Ice S/P Rx  Yes  -CP         ELECTRICAL STIMULATION    Attended/Unattended  Unattended  -CP      Stimulation Type  IFC  -CP      Location/Electrode Placement/Other  low back with ice  -CP      PT E-Stim Unattended Minutes  15  -CP        User Key  (r) = Recorded By, (t) = Taken By, (c) = Cosigned By    Initials Name Provider Type    Dora Barron, PTA Physical Therapy Assistant        OP Exercises     Row Name 05/27/21 1319 05/27/21 1300          Subjective Comments    Subjective Comments  --  Patient states that she has pain when sitting, walking and laying on back or on stomach.  She also has pain traveling down her right leg.    -CP        Subjective Pain    Able to rate subjective pain?  --  yes  -CP     Pre-Treatment Pain Level  --  8  -CP     Post-Treatment Pain Level  --  -- better  -CP        Total Minutes    52561 - PT Therapeutic Exercise Minutes  30  -CP  --        Exercise 1    Exercise Name 1  --  Pro II level 1 with towel behind back  -CP     Time 1  --  10  -CP        Exercise 2    Exercise Name 2  --  incline stretch  -CP     Cueing 2  --  Verbal;Demo  -CP     Sets 2  --  3  -CP     Time 2  --  30  -CP        Exercise 3    Exercise Name 3  --  standing Hs stretch  -CP     Cueing 3  --  Verbal;Demo  -CP     Sets 3  --  3  -CP     Time 3  --  30  -CP     Additional Comments  --  B LE  -CP        Exercise 4    Exercise Name 4  --  Prone laying  -CP     Cueing 4  --  Verbal;Tactile  -CP     Time 4  --  3  -CP        Exercise 5    Exercise Name 5  --  NELSON  -CP     Cueing 5  --  Tactile;Verbal  -CP     Sets 5  --  1  -CP     Reps 5  --  10  -CP     Time 5  --  10\"  -CP     Additional Comments  --  pain increased to 10/10  -CP        Exercise 6    Exercise Name 6  --  SKTC  -CP     Cueing 6  --  Verbal;Tactile  -CP     Sets 6  --  3  -CP     Time 6  --  30  -CP     Additional Comments  --  B LE  -CP        Exercise 7    Exercise Name 7  --  " DKTC  -CP     Cueing 7  --  Verbal;Tactile  -CP     Sets 7  --  3  -CP     Time 7  --  30  -CP     Additional Comments  --  BLE  -CP       User Key  (r) = Recorded By, (t) = Taken By, (c) = Cosigned By    Initials Name Provider Type    CP Dora Capps, PTA Physical Therapy Assistant                       PT OP Goals     Row Name 05/27/21 1300          PT Short Term Goals    STG Date to Achieve  05/26/21  -CP     STG 1  Pt independent with maintaining good sitting posture +/- lumbar support  -CP     STG 1 Progress  Ongoing  -CP     STG 2  Improve R LE MMT to 4/5  -CP     STG 2 Progress  Progressing  -CP     STG 3  Reduce LBP by 50% with 50% reduction in B LE sciatica symptoms  -CP     STG 3 Progress  Not Met  -CP     STG 4  Improve lumbar spine AROM to minimal limit 75%-(flex/ext)  -CP     STG 4 Progress  Not Met  -CP        Long Term Goals    LTG Date to Achieve  06/09/21  -CP     LTG 1  Reduce LBP by 75% with 100% reduction in B LE sciatica symptoms  -CP     LTG 1 Progress  Not Met  -CP     LTG 2  Improve B LE MMT to 4+/5   -CP     LTG 2 Progress  Progressing  -CP     LTG 3  Improve lumbar spine AROM to WFL-all planes  -CP     LTG 3 Progress  Progressing  -CP        Time Calculation    PT Goal Re-Cert Due Date  06/02/21  -CP       User Key  (r) = Recorded By, (t) = Taken By, (c) = Cosigned By    Initials Name Provider Type    CP Dora Capps, PTA Physical Therapy Assistant          Therapy Education  Education Details: DKTC  Given: HEP  Program: New  How Provided: Verbal, Demonstration, Written  Provided to: Patient  Level of Understanding: Teach back education performed, Demonstrated, Verbalized              Time Calculation:   Start Time: 1100  Stop Time: 1155  Time Calculation (min): 55 min  Total Timed Code Minutes- PT: 30 minute(s)  Timed Charges  70137 - PT Therapeutic Exercise Minutes: 30  Untimed Charges  PT E-Stim Unattended Minutes: 15  PT Ice Rx Minutes: 15  Total Minutes  Timed Charges Total  Minutes: 30  Untimed Charges Total Minutes: 15   Total Minutes: 45  Therapy Charges for Today     Code Description Service Date Service Provider Modifiers Qty    83371401638 HC PT THER PROC EA 15 MIN 5/27/2021 Dora Capps, PTA GP 2    45489870082 HC PT ELECTRICAL STIM UNATTENDED 5/27/2021 Dora Capps, PTA  1                    Dora Capps, MALACHI  5/27/2021

## 2021-06-01 ENCOUNTER — HOSPITAL ENCOUNTER (OUTPATIENT)
Dept: PHYSICAL THERAPY | Facility: HOSPITAL | Age: 21
Setting detail: THERAPIES SERIES
Discharge: HOME | End: 2021-06-01

## 2021-06-01 DIAGNOSIS — M51.26 LUMBAR DISC HERNIATION: Primary | ICD-10-CM

## 2021-06-01 NOTE — THERAPY TREATMENT NOTE
Outpatient Physical Therapy Ortho Treatment Note  [facility]     Patient Name: Moriah Nicole  : 2000  MRN: [id number]  Today's Date: 2021      Visit Date: 2021  Subjective Improvement: 0  MD visit: 21  Visit Number: 36  Total Approved:5 visit 21 to  21  Recert Date: 21  Visit Dx:    ICD-10-CM ICD-9-CM   1. Lumbar disc herniation  M51.26 722.10       Patient Active Problem List   Diagnosis   • Lumbar disc herniation   • Class 1 obesity due to excess calories with body mass index (BMI) of 34.0 to 34.9 in adult   • Former smoker        Past Medical History:   Diagnosis Date   • Postural hypotension         No past surgical history on file.    PT Ortho     Row Name 21 1500       Subjective Comments    Subjective Comments  Pt reports not a good day. Pt reports did her HEP.  -TL       Precautions and Contraindications    Precautions/Limitations  no known precautions/limitations  -TL       Subjective Pain    Able to rate subjective pain?  yes  -TL    Pre-Treatment Pain Level  7  -TL      User Key  (r) = Recorded By, (t) = Taken By, (c) = Cosigned By    Initials Name Provider Type    Kinsey Gomez PTA Physical Therapy Assistant                      PT Assessment/Plan     Row Name 21 1600          PT Assessment    Assessment Comments  No new goals met at this time. Pt just had baby 5 months ago. pt tolerated NELSON better with 2 pillows under belly. Pt tolerated bridges, LTR with ball squeezes. PTA added theses ex along with seated pirif S. No new goals met at this time.  Pt's pain decrease by 2 points this date.  -TL        PT Plan    PT Frequency  2x/week  -TL     Predicted Duration of Therapy Intervention (PT)  4-6 weeks  -TL     PT Plan Comments  add manual Ham S and sacral towel roll S  -TL       User Key  (r) = Recorded By, (t) = Taken By, (c) = Cosigned By    Initials Name Provider Type    Kinsey Gomez PTA Physical Therapy Assistant           Modalities     Row Name 06/01/21 1500             Ice    Ice Applied  Yes  -TL      Location  low back with e-stim  -TL      Ice S/P Rx  Yes  -TL         ELECTRICAL STIMULATION    Attended/Unattended  Unattended  -TL      Stimulation Type  IFC  -TL      Location/Electrode Placement/Other  low back with ice  -TL      PT E-Stim Unattended Minutes  15  -TL        User Key  (r) = Recorded By, (t) = Taken By, (c) = Cosigned By    Initials Name Provider Type    TL Kinsey Brown PTA Physical Therapy Assistant        OP Exercises     Row Name 06/01/21 1500             Subjective Comments    Subjective Comments  Pt reports not a good day. Pt reports did her HEP.  -TL         Subjective Pain    Able to rate subjective pain?  yes  -TL      Pre-Treatment Pain Level  7  -TL      Post-Treatment Pain Level  5  -TL         Total Minutes    53103 - PT Therapeutic Exercise Minutes  46  -TL         Exercise 1    Exercise Name 1  pro ll level 3  -TL      Time 1  10 mins  -TL      Additional Comments  for strengthening  -TL         Exercise 2    Exercise Name 2  seated Pirif S  -TL      Sets 2  2  -TL      Time 2  30 sec hold  -TL         Exercise 3    Exercise Name 3  LTR with ball  -TL      Sets 3  10  -TL      Time 3  10  -TL         Exercise 4    Exercise Name 4  bridges  -TL      Sets 4  1  -TL      Reps 4  10  -TL      Additional Comments  with ball between legs  -TL         Exercise 5    Exercise Name 5  NELSON  -TL      Reps 5  3mins  -TL      Additional Comments  2 pillows under belly  -TL         Exercise 6    Exercise Name 6  see modalities  -TL        User Key  (r) = Recorded By, (t) = Taken By, (c) = Cosigned By    Initials Name Provider Type    TL Kinsey Brown PTA Physical Therapy Assistant                       PT OP Goals     Row Name 06/01/21 1500          PT Short Term Goals    STG Date to Achieve  05/26/21  -TL     STG 1  Pt independent with maintaining good sitting posture +/- lumbar support  -TL     STG  1 Progress  Ongoing  -TL     STG 2  Improve R LE MMT to 4/5  -TL     STG 2 Progress  Progressing  -TL     STG 3  Reduce LBP by 50% with 50% reduction in B LE sciatica symptoms  -TL     STG 3 Progress  Ongoing  -TL     STG 4  Improve lumbar spine AROM to minimal limit 75%-(flex/ext)  -TL     STG 4 Progress  Ongoing  -TL        Long Term Goals    LTG Date to Achieve  06/09/21  -TL     LTG 1  Reduce LBP by 75% with 100% reduction in B LE sciatica symptoms  -TL     LTG 1 Progress  Not Met  -TL     LTG 2  Improve B LE MMT to 4+/5   -TL     LTG 2 Progress  Progressing  -TL     LTG 3  Improve lumbar spine AROM to WFL-all planes  -TL     LTG 3 Progress  Progressing  -TL        Time Calculation    PT Goal Re-Cert Due Date  06/02/21  -TL       User Key  (r) = Recorded By, (t) = Taken By, (c) = Cosigned By    Initials Name Provider Type    TL Kinsey Brown PTA Physical Therapy Assistant          Therapy Education  Education Details: bridges, NELSON with 2 pillows , pirif S seated, LTR with pillow between knees, use ice for pain  Given: HEP, Symptoms/condition management, Pain management  Program: New, Reinforced  How Provided: Verbal, Demonstration, Written  Provided to: Patient  Level of Understanding: Teach back education performed, Verbalized, Demonstrated              Time Calculation:   Start Time: 1515  Stop Time: 1616  Time Calculation (min): 61 min  Timed Charges  27497 - PT Therapeutic Exercise Minutes: 46  Untimed Charges  PT E-Stim Unattended Minutes: 15  Total Minutes  Timed Charges Total Minutes: 46  Untimed Charges Total Minutes: 15   Total Minutes: 61                Kinsey Brown PTA  6/1/2021

## 2021-06-07 ENCOUNTER — HOSPITAL ENCOUNTER (OUTPATIENT)
Dept: PHYSICAL THERAPY | Facility: HOSPITAL | Age: 21
Setting detail: THERAPIES SERIES
Discharge: HOME | End: 2021-06-07

## 2021-06-07 DIAGNOSIS — M51.26 LUMBAR DISC HERNIATION: Primary | ICD-10-CM

## 2021-06-07 PROCEDURE — G0283 ELEC STIM OTHER THAN WOUND: HCPCS

## 2021-06-07 PROCEDURE — 97110 THERAPEUTIC EXERCISES: CPT

## 2021-06-07 NOTE — THERAPY PROGRESS REPORT/RE-CERT
"    Outpatient Physical Therapy Ortho Progress Note  [facility]     Patient Name: Moriah Nicole  : 2000  MRN: [id number]  Today's Date: 2021      Visit Date: 2021   Attendance:  (8 visits approved until 6/10/2021)  Subjective Improvement: \"some\"  Next MD Visit: 2021  Recert Date: 2021    Therapy Diagnosis: Low back pain      Visit Dx:    ICD-10-CM ICD-9-CM   1. Lumbar disc herniation  M51.26 722.10       Patient Active Problem List   Diagnosis   • Lumbar disc herniation   • Class 1 obesity due to excess calories with body mass index (BMI) of 34.0 to 34.9 in adult   • Former smoker        Past Medical History:   Diagnosis Date   • Postural hypotension         No past surgical history on file.    PT Ortho     Row Name 21 1300       Subjective Comments    Subjective Comments  Pt stated that she that she feels like she is very slowly getting better. 50% subjective improvement.  -       Precautions and Contraindications    Precautions/Limitations  no known precautions/limitations  -       Subjective Pain    Able to rate subjective pain?  yes  -    Pre-Treatment Pain Level  5  -    Post-Treatment Pain Level  4  -       Posture/Observations    Posture/Observations Comments  Slouched posture. TTP sacrum grossly and bilat PSIS.  -       Sensory Screen for Light Touch- Lower Quarter Clearing    L1 (inguinal area)  Right:;Diminished  -MH    L2 (anterior mid thigh)  Right:;Diminished  -MH    L3 (distal anterior thigh)  Right:;Diminished  -MH    L4 (medial lower leg/foot)  Bilateral:;Intact  -MH    L5 (lateral lower leg/great toe)  Bilateral:;Intact  -MH    S1 (bottom of foot)  Right:;Diminished  -       Myotomal Screen- Lower Quarter Clearing    Hip flexion (L2)  Bilateral:;3+ (Fair +)  -MH    Knee extension (L3)  Bilateral:;3+ (Fair +)  -MH    Ankle DF (L4)  Bilateral:;3+ (Fair +)  -MH    Great toe extension (L5)  Bilateral:;3+ (Fair +)  -MH    Ankle PF (S1)  " Bilateral:;3 (Fair)  -    Knee flexion (S2)  Right:;3+ (Fair +);Left:;4 (Good)  -       Lumbar ROM Screen- Lower Quarter Clearing    Lumbar Flexion  Impaired fingers to knee joint line  -    Lumbar Extension  Impaired severe limit-unable to extend past neutral   -    Lumbar Lateral Flexion  Impaired Fingertips to mid thigh bilat. Increases pain bilat.  -    Lumbar Rotation  Impaired Mildly impaired. Increases pain.  -      User Key  (r) = Recorded By, (t) = Taken By, (c) = Cosigned By    Initials Name Provider Type     Missy Buchanan, PT Physical Therapist                      PT Assessment/Plan     Row Name 06/07/21 1300          PT Assessment    Functional Limitations  Performance in work activities;Performance in leisure activities;Performance in self-care ADL;Performance in sport activities;Limitation in home management  -     Impairments  Gait;Muscle strength;Pain;Posture;Range of motion;Sensation;Poor body mechanics;Impaired flexibility;Endurance  -     Assessment Comments  Recheck completed this visit. Pt has not met any goals at this time. She has not demonstrated significant improvements in ROM, strength, or posture at this time. This may be due to this being her 3rd visit for treatment. Pt was educated on importance of compliance with skilled PT and attending PT sessions for the next few weeks in order to advance her plan/HEP. Pt stated she understood and is planning on attending 2x this week and next week. Pt would benefit from cont skilled PT to progress strength and ROM.  -     Rehab Potential  Good  -     Patient/caregiver participated in establishment of treatment plan and goals  Yes  -     Patient would benefit from skilled therapy intervention  Yes  -        PT Plan    PT Frequency  2x/week  -     Predicted Duration of Therapy Intervention (PT)  4 more weeks  -     PT Plan Comments  Manual HS stretch and hip strengthening exercises next. May hold off on Pro II to allow  for more time to educate pt on exercises pt can do at home.  -       User Key  (r) = Recorded By, (t) = Taken By, (c) = Cosigned By    Initials Name Provider Type    Missy Snyder, PT Physical Therapist          Modalities     Row Name 06/07/21 1300             Ice    Ice Applied  Yes  -      Location  low back with e-stim  -      Ice S/P Rx  Yes  -         ELECTRICAL STIMULATION    Attended/Unattended  Unattended  -      Stimulation Type  IFC  -      Location/Electrode Placement/Other  low back with ice  -MH      PT E-Stim Unattended Minutes  15  -MH        User Key  (r) = Recorded By, (t) = Taken By, (c) = Cosigned By    Initials Name Provider Type    Missy Snyder, PT Physical Therapist        OP Exercises     Row Name 06/07/21 1300             Subjective Comments    Subjective Comments  Pt stated that she that she feels like she is very slowly getting better. 50% subjective improvement.  -         Subjective Pain    Able to rate subjective pain?  yes  -      Pre-Treatment Pain Level  5  -MH      Post-Treatment Pain Level  4  -MH         Total Minutes    33787 - PT Therapeutic Exercise Minutes  30  -MH         Exercise 1    Exercise Name 1  Pro II LE; strength  -MH      Time 1  10 min  -MH      Additional Comments  lvl 3; seat 8  -MH         Exercise 2    Exercise Name 2  Seated priformis stretch  -      Cueing 2  Verbal  -MH      Sets 2  3  -MH      Time 2  30 sec  -MH         Exercise 3    Exercise Name 3  Bridges with tball  -      Cueing 3  Verbal;Demo  -MH      Reps 3  10  -MH      Time 3  5 sec hold  -MH             Exercise 4       Exercise Name 4 LTR with ball -MH      Cueing 4  Verbal;Demo -MH      Reps 4 10 -MH      Time 4  5 sec hold  -MH             Exercise 5       Exercise Name 4  IFC - see modalities -        User Key  (r) = Recorded By, (t) = Taken By, (c) = Cosigned By    Initials Name Provider Type    Missy Snyder, PT Physical Therapist                       PT  OP Goals     Row Name 06/07/21 1300          PT Short Term Goals    STG Date to Achieve  06/28/21  -     STG 1  Pt independent with maintaining good sitting posture +/- lumbar support  -     STG 1 Progress  Not Met  -     STG 2  Improve R LE MMT to 4/5  -     STG 2 Progress  Not Met  -     STG 3  Reduce LBP by 50% with 50% reduction in B LE sciatica symptoms  -     STG 3 Progress  Not Met  -     STG 4  Improve lumbar spine AROM to minimal limit 75%-(flex/ext)  -     STG 4 Progress  Not Met  -        Long Term Goals    LTG Date to Achieve  06/28/21  -     LTG 1  Reduce LBP by 75% with 100% reduction in B LE sciatica symptoms  -     LTG 1 Progress  Not Met  -     LTG 2  Improve B LE MMT to 4+/5   -     LTG 2 Progress  Not Met  -     LTG 3  Improve lumbar spine AROM to WFL-all planes  -     LTG 3 Progress  Not Met  -        Time Calculation    PT Goal Re-Cert Due Date  06/28/21  -       User Key  (r) = Recorded By, (t) = Taken By, (c) = Cosigned By    Initials Name Provider Type     Missy Buchanan, PT Physical Therapist                  Outcome Measure Options: Modifed Owestry  Modified Oswestry  Modified Oswestry Score/Comments: 60% (30/50)      Time Calculation:   Start Time: 1300  Stop Time: 1354  Time Calculation (min): 54 min  Timed Charges  12019 - PT Therapeutic Exercise Minutes: 30  Untimed Charges  PT E-Stim Unattended Minutes: 15  Total Minutes  Timed Charges Total Minutes: 30  Untimed Charges Total Minutes: 15   Total Minutes: 45  Therapy Charges for Today     Code Description Service Date Service Provider Modifiers Qty    53397640011 HC PT ELECTRICAL STIM UNATTENDED 6/7/2021 Missy Buchanan, PT  1    22849331061 HC PT THER PROC EA 15 MIN 6/7/2021 Missy Buchanan, PT  3          PT G-Codes  Outcome Measure Options: Modifed Owestry  Modified Oswestry Score/Comments: 60% (30/50)         Missy Buchanan PT  6/7/2021

## 2021-06-08 ENCOUNTER — LAB (OUTPATIENT)
Dept: LAB | Facility: HOSPITAL | Age: 21
End: 2021-06-08

## 2021-06-08 ENCOUNTER — OFFICE VISIT (OUTPATIENT)
Dept: FAMILY MEDICINE CLINIC | Facility: CLINIC | Age: 21
End: 2021-06-08

## 2021-06-08 VITALS
HEIGHT: 64 IN | BODY MASS INDEX: 33.05 KG/M2 | OXYGEN SATURATION: 96 % | HEART RATE: 96 BPM | RESPIRATION RATE: 20 BRPM | SYSTOLIC BLOOD PRESSURE: 120 MMHG | WEIGHT: 193.6 LBS | TEMPERATURE: 97.4 F | DIASTOLIC BLOOD PRESSURE: 76 MMHG

## 2021-06-08 DIAGNOSIS — F51.04 PSYCHOPHYSIOLOGICAL INSOMNIA: ICD-10-CM

## 2021-06-08 DIAGNOSIS — F41.9 ANXIETY: ICD-10-CM

## 2021-06-08 DIAGNOSIS — Z76.89 ENCOUNTER TO ESTABLISH CARE: Primary | ICD-10-CM

## 2021-06-08 DIAGNOSIS — Z00.00 ANNUAL PHYSICAL EXAM: ICD-10-CM

## 2021-06-08 DIAGNOSIS — Z11.59 NEED FOR HEPATITIS C SCREENING TEST: ICD-10-CM

## 2021-06-08 DIAGNOSIS — E66.09 CLASS 1 OBESITY DUE TO EXCESS CALORIES WITHOUT SERIOUS COMORBIDITY WITH BODY MASS INDEX (BMI) OF 34.0 TO 34.9 IN ADULT: ICD-10-CM

## 2021-06-08 DIAGNOSIS — M51.26 LUMBAR DISC HERNIATION: ICD-10-CM

## 2021-06-08 LAB
ALBUMIN SERPL-MCNC: 4.7 G/DL (ref 3.5–5.2)
ALBUMIN/GLOB SERPL: 1.6 G/DL
ALP SERPL-CCNC: 113 U/L (ref 39–117)
ALT SERPL W P-5'-P-CCNC: 23 U/L (ref 1–33)
ANION GAP SERPL CALCULATED.3IONS-SCNC: 10.7 MMOL/L (ref 5–15)
AST SERPL-CCNC: 21 U/L (ref 1–32)
BASOPHILS # BLD AUTO: 0.04 10*3/MM3 (ref 0–0.2)
BASOPHILS NFR BLD AUTO: 0.5 % (ref 0–1.5)
BILIRUB SERPL-MCNC: 0.2 MG/DL (ref 0–1.2)
BUN SERPL-MCNC: 10 MG/DL (ref 6–20)
BUN/CREAT SERPL: 14.7 (ref 7–25)
CALCIUM SPEC-SCNC: 9.7 MG/DL (ref 8.6–10.5)
CHLORIDE SERPL-SCNC: 102 MMOL/L (ref 98–107)
CHOLEST SERPL-MCNC: 215 MG/DL (ref 0–200)
CO2 SERPL-SCNC: 26.3 MMOL/L (ref 22–29)
CREAT SERPL-MCNC: 0.68 MG/DL (ref 0.57–1)
DEPRECATED RDW RBC AUTO: 43.9 FL (ref 37–54)
EOSINOPHIL # BLD AUTO: 0.21 10*3/MM3 (ref 0–0.4)
EOSINOPHIL NFR BLD AUTO: 2.5 % (ref 0.3–6.2)
ERYTHROCYTE [DISTWIDTH] IN BLOOD BY AUTOMATED COUNT: 14.9 % (ref 12.3–15.4)
GFR SERPL CREATININE-BSD FRML MDRD: 110 ML/MIN/1.73
GLOBULIN UR ELPH-MCNC: 3 GM/DL
GLUCOSE SERPL-MCNC: 85 MG/DL (ref 65–99)
HBA1C MFR BLD: 5.25 % (ref 4.8–5.6)
HCT VFR BLD AUTO: 45.1 % (ref 34–46.6)
HCV AB SER DONR QL: NORMAL
HDLC SERPL-MCNC: 52 MG/DL (ref 40–60)
HGB BLD-MCNC: 14.9 G/DL (ref 12–15.9)
IMM GRANULOCYTES # BLD AUTO: 0.04 10*3/MM3 (ref 0–0.05)
IMM GRANULOCYTES NFR BLD AUTO: 0.5 % (ref 0–0.5)
LDLC SERPL CALC-MCNC: 134 MG/DL (ref 0–100)
LDLC/HDLC SERPL: 2.5 {RATIO}
LYMPHOCYTES # BLD AUTO: 3.69 10*3/MM3 (ref 0.7–3.1)
LYMPHOCYTES NFR BLD AUTO: 43.2 % (ref 19.6–45.3)
MCH RBC QN AUTO: 27 PG (ref 26.6–33)
MCHC RBC AUTO-ENTMCNC: 33 G/DL (ref 31.5–35.7)
MCV RBC AUTO: 81.9 FL (ref 79–97)
MONOCYTES # BLD AUTO: 0.6 10*3/MM3 (ref 0.1–0.9)
MONOCYTES NFR BLD AUTO: 7 % (ref 5–12)
NEUTROPHILS NFR BLD AUTO: 3.97 10*3/MM3 (ref 1.7–7)
NEUTROPHILS NFR BLD AUTO: 46.3 % (ref 42.7–76)
NRBC BLD AUTO-RTO: 0 /100 WBC (ref 0–0.2)
PLATELET # BLD AUTO: 446 10*3/MM3 (ref 140–450)
PMV BLD AUTO: 9.7 FL (ref 6–12)
POTASSIUM SERPL-SCNC: 4.3 MMOL/L (ref 3.5–5.2)
PROT SERPL-MCNC: 7.7 G/DL (ref 6–8.5)
RBC # BLD AUTO: 5.51 10*6/MM3 (ref 3.77–5.28)
SODIUM SERPL-SCNC: 139 MMOL/L (ref 136–145)
T4 FREE SERPL-MCNC: 1.28 NG/DL (ref 0.93–1.7)
TRIGL SERPL-MCNC: 164 MG/DL (ref 0–150)
TSH SERPL DL<=0.05 MIU/L-ACNC: 2.04 UIU/ML (ref 0.27–4.2)
VLDLC SERPL-MCNC: 29 MG/DL (ref 5–40)
WBC # BLD AUTO: 8.55 10*3/MM3 (ref 3.4–10.8)

## 2021-06-08 PROCEDURE — 99214 OFFICE O/P EST MOD 30 MIN: CPT | Performed by: NURSE PRACTITIONER

## 2021-06-08 PROCEDURE — 80053 COMPREHEN METABOLIC PANEL: CPT

## 2021-06-08 PROCEDURE — 86803 HEPATITIS C AB TEST: CPT

## 2021-06-08 PROCEDURE — 83036 HEMOGLOBIN GLYCOSYLATED A1C: CPT

## 2021-06-08 PROCEDURE — 84443 ASSAY THYROID STIM HORMONE: CPT

## 2021-06-08 PROCEDURE — 84439 ASSAY OF FREE THYROXINE: CPT

## 2021-06-08 PROCEDURE — 36415 COLL VENOUS BLD VENIPUNCTURE: CPT

## 2021-06-08 PROCEDURE — 80061 LIPID PANEL: CPT

## 2021-06-08 PROCEDURE — 85025 COMPLETE CBC W/AUTO DIFF WBC: CPT

## 2021-06-08 RX ORDER — FLUTICASONE PROPIONATE 50 MCG
2 SPRAY, SUSPENSION (ML) NASAL DAILY PRN
COMMUNITY
End: 2022-08-25

## 2021-06-08 RX ORDER — AMITRIPTYLINE HYDROCHLORIDE 10 MG/1
10 TABLET, FILM COATED ORAL NIGHTLY
Qty: 30 TABLET | Refills: 3 | Status: SHIPPED | OUTPATIENT
Start: 2021-06-08 | End: 2022-08-25 | Stop reason: HOSPADM

## 2021-06-08 NOTE — PROGRESS NOTES
Subjective   Moriah Nicole is a 21 y.o. female.     CC: Establish care-back pain, obesity, anxiety, insomnia        Back Pain  This is a new problem. The current episode started more than 1 month ago. The problem occurs constantly. The problem is unchanged. The pain is present in the lumbar spine. The quality of the pain is described as aching and shooting. The symptoms are aggravated by bending, position, standing and twisting. Associated symptoms include leg pain, numbness, paresthesias, tingling and weakness. Pertinent negatives include no abdominal pain, bladder incontinence, bowel incontinence, chest pain, dysuria, fever, headaches, paresis, pelvic pain, perianal numbness or weight loss. Risk factors include obesity, lack of exercise and sedentary lifestyle. She has tried NSAIDs, analgesics, home exercises and muscle relaxant (percocet) for the symptoms. The treatment provided mild relief.   Obesity  This is a chronic problem. The current episode started more than 1 year ago. The problem occurs constantly. The problem has been unchanged. Associated symptoms include numbness and weakness. Pertinent negatives include no abdominal pain, arthralgias, chest pain, chills, congestion, coughing, diaphoresis, fatigue, fever, headaches, myalgias, nausea, rash, sore throat or vomiting. The symptoms are aggravated by drinking and eating. She has tried eating, drinking and walking for the symptoms. The treatment provided mild relief.   Anxiety  Presents for initial visit. Onset was more than 5 years ago. The problem has been gradually worsening. Symptoms include excessive worry, insomnia, nervous/anxious behavior, panic and restlessness. Patient reports no chest pain, dizziness, nausea, palpitations, shortness of breath or suicidal ideas. Symptoms occur most days. The severity of symptoms is moderate and interfering with daily activities. Nothing aggravates the symptoms. The quality of sleep is poor. Nighttime  awakenings: one to two.     Her past medical history is significant for anxiety/panic attacks and depression. There is no history of suicide attempts. Past treatments include nothing. The treatment provided no relief. Compliance with prior treatments has been good.   Insomnia  This is a chronic problem. The current episode started more than 1 month ago. The problem occurs daily. The problem has been gradually worsening. Associated symptoms include numbness and weakness. Pertinent negatives include no abdominal pain, arthralgias, chest pain, chills, congestion, coughing, diaphoresis, fatigue, fever, headaches, myalgias, nausea, rash, sore throat or vomiting. Exacerbated by:  baby 6 months old. Treatments tried: Melatonin. The treatment provided no relief.        The following portions of the patient's history were reviewed and updated as appropriate: allergies, current medications, past family history, past medical history, past social history, past surgical history and problem list.    Review of Systems   Constitutional: Negative for activity change, appetite change, chills, diaphoresis, fatigue, fever, unexpected weight gain and unexpected weight loss.   HENT: Negative for congestion, sore throat, trouble swallowing and voice change.    Eyes: Negative for blurred vision, double vision, photophobia, pain and visual disturbance.   Respiratory: Negative for cough, chest tightness, shortness of breath and wheezing.    Cardiovascular: Negative for chest pain, palpitations and leg swelling.   Gastrointestinal: Negative for abdominal distention, abdominal pain, anal bleeding, blood in stool, bowel incontinence, constipation, diarrhea, nausea, vomiting, GERD and indigestion.   Endocrine: Negative for cold intolerance, heat intolerance, polydipsia, polyphagia and polyuria.   Genitourinary: Negative for dysuria, frequency, hematuria, pelvic pain, urgency and urinary incontinence.   Musculoskeletal: Positive for back  pain. Negative for arthralgias and myalgias.   Skin: Negative for rash.   Allergic/Immunologic: Negative.    Neurological: Positive for tingling, weakness, numbness and paresthesias. Negative for dizziness, syncope, light-headedness and headache.   Hematological: Negative.    Psychiatric/Behavioral: Positive for sleep disturbance and stress. Negative for suicidal ideas. The patient is nervous/anxious and has insomnia.        Objective   Physical Exam  Vitals and nursing note reviewed.   Constitutional:       General: She is not in acute distress.     Appearance: Normal appearance. She is well-developed. She is obese. She is not ill-appearing, toxic-appearing or diaphoretic.   HENT:      Head: Normocephalic and atraumatic.      Right Ear: External ear normal.      Left Ear: External ear normal.      Nose: Nose normal.   Eyes:      Conjunctiva/sclera: Conjunctivae normal.      Pupils: Pupils are equal, round, and reactive to light.   Neck:      Thyroid: No thyromegaly.      Trachea: No tracheal deviation.   Cardiovascular:      Rate and Rhythm: Normal rate and regular rhythm.      Heart sounds: Normal heart sounds. No murmur heard.   No friction rub. No gallop.    Pulmonary:      Effort: Pulmonary effort is normal. No respiratory distress.      Breath sounds: Normal breath sounds. No stridor. No wheezing, rhonchi or rales.   Abdominal:      General: Bowel sounds are normal. There is no distension.      Palpations: Abdomen is soft. There is no mass.      Tenderness: There is no abdominal tenderness. There is no guarding or rebound.      Hernia: No hernia is present.   Musculoskeletal:         General: No tenderness. Normal range of motion.      Cervical back: Normal range of motion and neck supple.   Lymphadenopathy:      Cervical: No cervical adenopathy.   Skin:     General: Skin is warm and dry.      Coloration: Skin is not pale.      Findings: No erythema or rash.   Neurological:      Mental Status: She is alert and  oriented to person, place, and time.      Cranial Nerves: No cranial nerve deficit.      Coordination: Coordination normal.   Psychiatric:         Attention and Perception: Attention and perception normal.         Mood and Affect: Affect normal. Mood is anxious.         Speech: Speech normal.         Behavior: Behavior normal. Behavior is cooperative.         Thought Content: Thought content normal. Thought content is not paranoid or delusional. Thought content does not include homicidal or suicidal ideation. Thought content does not include homicidal or suicidal plan.         Cognition and Memory: Cognition and memory normal.         Judgment: Judgment normal.           Assessment/Plan   Diagnoses and all orders for this visit:    1. Encounter to establish care (Primary)    2. Annual physical exam  -     Hepatitis C Antibody; Future  -     CBC & Differential; Future  -     Comprehensive Metabolic Panel; Future  -     Hemoglobin A1c; Future  -     TSH; Future  -     T4, Free; Future  -     Lipid Panel; Future, will call with results    3. Class 1 obesity due to excess calories without serious comorbidity with body mass index (BMI) of 34.0 to 34.9 in adult  -     Comprehensive Metabolic Panel; Future  -     Hemoglobin A1c; Future  -     TSH; Future  -     T4, Free; Future  -     Lipid Panel; Future, will call with results.  Recommend highly plant-based diet with lean meat and protein choices, portion control.  Exercise currently limited due to back pain issues.  We will continue to monitor.    4. Lumbar disc herniation  -     Continue follow-up with therapy, pain management and neurosurgery.  Further plan of care per #6.  -Amitriptyline (ELAVIL) 10 MG tablet; Take 1 tablet by mouth Every Night.  Dispense: 30 tablet; Refill: 3    5. Need for hepatitis C screening test  -     Hepatitis C Antibody; Future    6. Anxiety  -    Considering patient has anxiety, insomnia and dealing with back pain patient has agreed to a  trial of Elavil 10 mg p.o. nightly.  Instructed not to take with other sedating medications or alcohol.  Instructed not to work, drive or operate machinery while taking this medication.  Take 8 to 10 hours before planned awakening.  Stop medication if symptoms worsen.  Present to the ER for suicidal homicidal ideations.  Will reevaluate in 1 month or sooner if needed.  Patient verbalized understanding instruction agrees with plan of care.  -Amitriptyline (ELAVIL) 10 MG tablet; Take 1 tablet by mouth Every Night.  Dispense: 30 tablet; Refill: 3    7. Psychophysiological insomnia  -     Plan of care stated above in #6.  -Amitriptyline (ELAVIL) 10 MG tablet; Take 1 tablet by mouth Every Night.  Dispense: 30 tablet; Refill: 3    8.  Follow-up in 1 month or sooner for any acute needs.          This document has been electronically signed by DAVID Ye on June 8, 2021 09:18 CDT

## 2021-06-09 ENCOUNTER — HOSPITAL ENCOUNTER (OUTPATIENT)
Dept: PHYSICAL THERAPY | Facility: HOSPITAL | Age: 21
Setting detail: THERAPIES SERIES
Discharge: HOME | End: 2021-06-09

## 2021-06-09 DIAGNOSIS — M51.26 LUMBAR DISC HERNIATION: Primary | ICD-10-CM

## 2021-06-09 NOTE — PROGRESS NOTES
Cholesterol level slightly elevated. I recommend routine exercise and weight loss. Exercise may be limited for her due to her current back issues. Hopefully we can increase exercise when her back heals. Follow-up as scheduled.

## 2021-06-09 NOTE — THERAPY TREATMENT NOTE
Outpatient Physical Therapy Ortho Treatment Note  [facility]     Patient Name: Moriah Nicole  : 2000  MRN: [id number]  Today's Date: 2021      Visit Date: 2021  Subjective Improvement: back better  MD visit: 21  Visit Number: 59  Total Approved:8 approved until 6/10/2021  Recert Date: 21  Visit Dx:    ICD-10-CM ICD-9-CM   1. Lumbar disc herniation  M51.26 722.10       Patient Active Problem List   Diagnosis   • Lumbar disc herniation   • Class 1 obesity due to excess calories with body mass index (BMI) of 34.0 to 34.9 in adult   • Former smoker        Past Medical History:   Diagnosis Date   • Allergic    • Anxiety    • Obesity    • Postural hypotension    • Urinary tract infection         No past surgical history on file.    PT Ortho     Row Name 21 1300       Subjective Comments    Subjective Comments  Pt reports back is feeling better but just the legs.   -TL       Precautions and Contraindications    Precautions/Limitations  no known precautions/limitations  -TL       Subjective Pain    Able to rate subjective pain?  yes  -TL    Pre-Treatment Pain Level  7  -TL      User Key  (r) = Recorded By, (t) = Taken By, (c) = Cosigned By    Initials Name Provider Type    TL Kinsey Brown PTA Physical Therapy Assistant                      PT Assessment/Plan     Row Name 21 1300          PT Assessment    Assessment Comments  PTA given new ex this date of  SLR, clams,supine pirif S. Pt tolerated new ex. Sacrum left depress with right side pain. PTA performed MET to the right hip flexors this date. PTA corrected with MET. PTA instructed pt not to cross legs and keep equal weight bearing to both buttocks.. No new goals met at this time. Pt pain decrease 2 points.  -TL        PT Plan    PT Frequency  2x/week  -TL     Predicted Duration of Therapy Intervention (PT)  4 more weeks  -TL     PT Plan Comments  reverse clam and prone IR/ER hip  -TL       User Key  (r) =  Recorded By, (t) = Taken By, (c) = Cosigned By    Initials Name Provider Type    Kinsey Gomez PTA Physical Therapy Assistant          Modalities     Row Name 06/09/21 1300             Ice    Ice Applied  Yes  -TL      Location  low back with e-stim  -TL      Ice S/P Rx  Yes  -TL         ELECTRICAL STIMULATION    Attended/Unattended  Unattended  -TL      Stimulation Type  IFC  -TL      Location/Electrode Placement/Other  low back with ice  -TL      PT E-Stim Unattended Minutes  15  -TL        User Key  (r) = Recorded By, (t) = Taken By, (c) = Cosigned By    Initials Name Provider Type    Kinsey Gomez PTA Physical Therapy Assistant        OP Exercises     Row Name 06/09/21 1300             Subjective Comments    Subjective Comments  Pt reports back is feeling better but just the legs.   -TL         Subjective Pain    Able to rate subjective pain?  yes  -TL      Pre-Treatment Pain Level  7  -TL      Post-Treatment Pain Level  5  -TL         Total Minutes    38254 - PT Therapeutic Exercise Minutes  47  -TL         Exercise 1    Exercise Name 1  manual HS S  -TL      Sets 1  2  -TL      Time 1  30 sec hold  -TL         Exercise 2    Exercise Name 2  SKTS S  -TL      Sets 2  2  -TL      Time 2  30 sec hold  -TL         Exercise 3    Exercise Name 3  pirif S  -TL      Reps 3  3  -TL      Time 3  30  -TL         Exercise 4    Exercise Name 4  bridges with ball  -TL      Sets 4  2  -TL      Reps 4  10  -TL      Time 4  5 sec hold  -TL         Exercise 5    Exercise Name 5  SLR with ab tucks  -TL      Sets 5  2  -TL      Reps 5  10  -TL      Time 5  5 sec hold  -TL         Exercise 6    Exercise Name 6  Prone TKE with glues  -TL         Exercise 7    Exercise Name 7  Prone heel squeezes  -TL         Exercise 8    Exercise Name 8  DKTC S  -TL      Reps 8  2  -TL      Time 8  30 sec hold  -TL         Exercise 9    Exercise Name 9  clams   -TL      Sets 9  2  -TL      Reps 9  10  -TL      Time 9  5 sec hold  -TL          Exercise 10    Exercise Name 10  sacrum left depressed  -TL      Additional Comments  pain on the right leg  -TL        User Key  (r) = Recorded By, (t) = Taken By, (c) = Cosigned By    Initials Name Provider Type    Kinsey Gomez PTA Physical Therapy Assistant                       PT OP Goals     Row Name 06/09/21 1300          PT Short Term Goals    STG Date to Achieve  06/28/21  -TL     STG 1  Pt independent with maintaining good sitting posture +/- lumbar support  -TL     STG 1 Progress  Ongoing  -TL     STG 2  Improve R LE MMT to 4/5  -TL     STG 2 Progress  Ongoing  -TL     STG 3  Reduce LBP by 50% with 50% reduction in B LE sciatica symptoms  -TL     STG 3 Progress  Ongoing;Progressing  -TL     STG 4  Improve lumbar spine AROM to minimal limit 75%-(flex/ext)  -TL     STG 4 Progress  Ongoing  -TL        Long Term Goals    LTG Date to Achieve  06/28/21  -TL     LTG 1  Reduce LBP by 75% with 100% reduction in B LE sciatica symptoms  -TL     LTG 1 Progress  Not Met  -TL     LTG 2  Improve B LE MMT to 4+/5   -TL     LTG 2 Progress  Not Met  -TL     LTG 3  Improve lumbar spine AROM to WFL-all planes  -TL     LTG 3 Progress  Not Met  -TL        Time Calculation    PT Goal Re-Cert Due Date  06/26/21  -TL       User Key  (r) = Recorded By, (t) = Taken By, (c) = Cosigned By    Initials Name Provider Type    Kinsey Gomez PTA Physical Therapy Assistant          Therapy Education  Education Details: supine pirif S, SLR with enage ab and clams ,ice for pain  Given: HEP, Symptoms/condition management, Pain management, Posture/body mechanics  Program: New, Reinforced  How Provided: Verbal, Demonstration, Written  Provided to: Patient  Level of Understanding: Teach back education performed, Verbalized, Demonstrated              Time Calculation:   Start Time: 1300  Stop Time: 1402  Time Calculation (min): 62 min  Timed Charges  80655 - PT Therapeutic Exercise Minutes: 47  Untimed Charges  PT E-Stim  Unattended Minutes: 15  Total Minutes  Timed Charges Total Minutes: 47  Untimed Charges Total Minutes: 15   Total Minutes: 62                Kinsey Brown, PTA  6/9/2021

## 2021-06-14 ENCOUNTER — HOSPITAL ENCOUNTER (OUTPATIENT)
Dept: PHYSICAL THERAPY | Facility: HOSPITAL | Age: 21
Setting detail: THERAPIES SERIES
Discharge: HOME | End: 2021-06-14

## 2021-06-14 DIAGNOSIS — M51.26 LUMBAR DISC HERNIATION: Primary | ICD-10-CM

## 2021-06-14 NOTE — THERAPY TREATMENT NOTE
Outpatient Physical Therapy Ortho Treatment Note  [facility]     Patient Name: Moriah Nicole  : 2000  MRN: [id number]  Today's Date: 2021      Visit Date: 2021  Subjective Improvement: some  MD visit: 21  Visit Number: 6/10  Total Approved:8 approved through 21  Recert Date: 21  Visit Dx:    ICD-10-CM ICD-9-CM   1. Lumbar disc herniation  M51.26 722.10       Patient Active Problem List   Diagnosis   • Lumbar disc herniation   • Class 1 obesity due to excess calories with body mass index (BMI) of 34.0 to 34.9 in adult   • Former smoker        Past Medical History:   Diagnosis Date   • Allergic    • Anxiety    • Obesity    • Postural hypotension    • Urinary tract infection         No past surgical history on file.    PT Ortho     Row Name 21 1300       Precautions and Contraindications    Precautions/Limitations  no known precautions/limitations  -TL       Subjective Pain    Able to rate subjective pain?  yes  -TL    Pre-Treatment Pain Level  9  -TL    Post-Treatment Pain Level  4  -TL      User Key  (r) = Recorded By, (t) = Taken By, (c) = Cosigned By    Initials Name Provider Type    Kinsey Gomez PTA Physical Therapy Assistant                      PT Assessment/Plan     Row Name 21 1400          PT Assessment    Assessment Comments  Pt corrected with sacrum this date with left depressed. Pt came into the clinic with 9/10 and left 4/10 today. pt walking better and less guarded. Pt tolerated pball ex well and Prone TKE and prone heels squeezes to HEP and add RTB to clams.   -TL        PT Plan    PT Frequency  2x/week  -TL     Predicted Duration of Therapy Intervention (PT)  4 more weeks  -TL     PT Plan Comments  add prone IR/ER hip ex  -TL       User Key  (r) = Recorded By, (t) = Taken By, (c) = Cosigned By    Initials Name Provider Type    Kinsey Gomez PTA Physical Therapy Assistant          Modalities     Row Name 21 1300              Ice    Ice Applied  Yes  -TL      Location  low back with e-stim  -TL      Ice S/P Rx  Yes  -TL         ELECTRICAL STIMULATION    Attended/Unattended  Unattended  -TL      Stimulation Type  IFC  -TL      Location/Electrode Placement/Other  low back with ice  -TL      PT E-Stim Unattended Minutes  15  -TL        User Key  (r) = Recorded By, (t) = Taken By, (c) = Cosigned By    Initials Name Provider Type    TL Kinsey Brown PTA Physical Therapy Assistant        OP Exercises     Row Name 06/14/21 1300             Subjective Pain    Able to rate subjective pain?  yes  -TL      Pre-Treatment Pain Level  9  -TL      Post-Treatment Pain Level  4  -TL         Total Minutes    37843 - PT Therapeutic Exercise Minutes  42  -TL      67192 - PT Manual Therapy Minutes  8  -TL         Exercise 1    Exercise Name 1  see manual  -TL         Exercise 2    Exercise Name 2  sacrum towel S  -TL      Time 2  5 mins  -TL         Exercise 3    Exercise Name 3  prone heel squeezes  -TL      Sets 3  2  -TL      Reps 3  10  -TL      Time 3  5 sec hold  -TL         Exercise 4    Exercise Name 4  Bridges with hip abd  -TL      Sets 4  2  -TL      Reps 4  10  -TL      Time 4  5 sec hold  -TL         Exercise 5    Exercise Name 5  Prone TKE with glutes  -TL      Sets 5  2  -TL      Reps 5  10  -TL      Time 5  5 sec hold  -TL         Exercise 6    Exercise Name 6  clams RTB  -TL      Sets 6  1  -TL      Reps 6  10  -TL      Time 6  5 sec hold  -TL         Exercise 7    Exercise Name 7  pball bounce  -TL      Time 7  2mins  -TL         Exercise 8    Exercise Name 8  pball LAQ  -TL      Sets 8  2  -TL      Reps 8  10  -TL         Exercise 9    Exercise Name 9  pball march  -TL      Sets 9  1  -TL      Reps 9  15  -TL        User Key  (r) = Recorded By, (t) = Taken By, (c) = Cosigned By    Initials Name Provider Type    TL Kinsey Brown PTA Physical Therapy Assistant                      Manual Rx (last 36 hours)      Manual Treatments      Row Name 06/14/21 1300             Total Minutes    18633 - PT Manual Therapy Minutes  8  -TL         Manual Rx 1    Manual Rx 1 Location  MET to right hip flexor  -TL      Manual Rx 1 Grade  x2sets for 3 reps for 5 sec hold  -TL         Manual Rx 2    Manual Rx 2 Location  MET to left ham  -TL      Manual Rx 2 Grade  2 sets of 3 reps  -TL         Manual Rx 3    Manual Rx 3 Location  gentle sacrum mobes to the right side of sacrum secondary to elevation  -TL      Manual Rx 3 Grade  3 mins  -TL        User Key  (r) = Recorded By, (t) = Taken By, (c) = Cosigned By    Initials Name Provider Type    Kinsey Gomez PTA Physical Therapy Assistant              Therapy Education  Education Details: add clams wtih band, prone TKE with glutes, prone heel squeezes with glutes. Eduated pt on how to correct self , sacrum towel S  Given: HEP, Symptoms/condition management, Pain management, Posture/body mechanics  Program: New, Reinforced  How Provided: Verbal, Demonstration, Written  Provided to: Patient  Level of Understanding: Teach back education performed, Verbalized, Demonstrated              Time Calculation:   Start Time: 1345  Stop Time: 1450  Time Calculation (min): 65 min  Timed Charges  90680 - PT Therapeutic Exercise Minutes: 42  71908 - PT Manual Therapy Minutes: 8  Untimed Charges  PT E-Stim Unattended Minutes: 15  Total Minutes  Timed Charges Total Minutes: 50  Untimed Charges Total Minutes: 15   Total Minutes: 65                Kinsey Brown PTA  6/14/2021

## 2021-06-16 ENCOUNTER — APPOINTMENT (OUTPATIENT)
Dept: PHYSICAL THERAPY | Facility: HOSPITAL | Age: 21
End: 2021-06-16

## 2021-06-17 ENCOUNTER — HOSPITAL ENCOUNTER (OUTPATIENT)
Dept: PHYSICAL THERAPY | Facility: HOSPITAL | Age: 21
Setting detail: THERAPIES SERIES
Discharge: HOME | End: 2021-06-17

## 2021-06-17 DIAGNOSIS — M51.26 LUMBAR DISC HERNIATION: Primary | ICD-10-CM

## 2021-06-17 NOTE — THERAPY TREATMENT NOTE
Outpatient Physical Therapy Ortho Treatment Note  [facility]     Patient Name: Moriah Nicole  : 2000  MRN: [id number]  Today's Date: 2021      Visit Date: 2021  Subjective Improvement: 75%  MD visit: 21  Visit Number:   Total Approved:8 approved visits 21-21  Recert Date: 21  Visit Dx:    ICD-10-CM ICD-9-CM   1. Lumbar disc herniation  M51.26 722.10       Patient Active Problem List   Diagnosis   • Lumbar disc herniation   • Class 1 obesity due to excess calories with body mass index (BMI) of 34.0 to 34.9 in adult   • Former smoker        Past Medical History:   Diagnosis Date   • Allergic    • Anxiety    • Obesity    • Postural hypotension    • Urinary tract infection         No past surgical history on file.    PT Ortho     Row Name 21 1300       Subjective Comments    Subjective Comments  pt reports feeling much better.   -TL       Precautions and Contraindications    Precautions/Limitations  no known precautions/limitations  -TL       Subjective Pain    Able to rate subjective pain?  yes  -TL    Pre-Treatment Pain Level  3 3/10 low back when sitting, 2/10 left leg  -TL      User Key  (r) = Recorded By, (t) = Taken By, (c) = Cosigned By    Initials Name Provider Type    Kinsey Gomez PTA Physical Therapy Assistant                      PT Assessment/Plan     Row Name 21 1300          PT Assessment    Assessment Comments  PTA checked alignment. Pt sacrum and pelvic level. pt pain decrease this date. Pt reported that she was able to correct self. Pt reports that she is 75% improved.  pt tolerated prone IR/ER hip ex RTB. pt has met short term goals #3. Pt had the lowest pain since coming to therapy. pt reports that she is feeling better.  -TL        PT Plan    PT Frequency  2x/week  -TL     Predicted Duration of Therapy Intervention (PT)  4 weeks  -TL     PT Plan Comments  check MMT LE  -TL       User Key  (r) = Recorded By, (t) = Taken  By, (c) = Cosigned By    Initials Name Provider Type    OLGA Vorajane Kinsey REYNOLDS PTA Physical Therapy Assistant          Modalities     Row Name 06/17/21 1300             Ice    Ice Applied  Yes  -TL      Location  low back with e-stim  -TL      Ice S/P Rx  Yes  -TL         ELECTRICAL STIMULATION    Attended/Unattended  Unattended  -TL      Stimulation Type  IFC  -TL      Location/Electrode Placement/Other  low back with ice  -TL      PT E-Stim Unattended Minutes  15  -TL        User Key  (r) = Recorded By, (t) = Taken By, (c) = Cosigned By    Initials Name Provider Type    OLGA Handleyluann Kinsey REYNOLDS PTA Physical Therapy Assistant        OP Exercises     Row Name 06/17/21 1300             Subjective Comments    Subjective Comments  pt reports feeling much better.   -TL         Subjective Pain    Able to rate subjective pain?  yes  -TL      Pre-Treatment Pain Level  3 3/10 low back when sitting, 2/10 left leg  -TL      Post-Treatment Pain Level  1  -TL         Total Minutes    26260 - PT Therapeutic Exercise Minutes  45  -TL         Exercise 1    Exercise Name 1  pro ll legs for strengthening  -TL      Time 1  10 mins  -TL      Additional Comments  level 3  -TL         Exercise 2    Exercise Name 2  st ham S  -TL      Sets 2  2  -TL      Time 2  30 sec hold  -TL         Exercise 3    Exercise Name 3  seated pirif S  -TL      Sets 3  2  -TL      Time 3  30 sec hold  -TL         Exercise 4    Exercise Name 4  Prone IR/ER with RTB  -TL      Sets 4  2  -TL      Reps 4  10  -TL         Exercise 5    Exercise Name 5  prone SLR  -TL      Sets 5  1  -TL      Reps 5  10  -TL      Time 5  5 sec hold  -TL         Exercise 6    Exercise Name 6  clams RTB  -TL      Sets 6  2  -TL      Reps 6  10  -TL      Time 6  5 sec hold  -TL         Exercise 7    Exercise Name 7  bridging with hip abd RTB  -TL      Sets 7  2  -TL      Reps 7  10  -TL         Exercise 8    Exercise Name 8  pelvic and sacrum aligment check.  -TL      Additional Comments   level  -TL         Exercise 9    Exercise Name 9  see modalities  -TL        User Key  (r) = Recorded By, (t) = Taken By, (c) = Cosigned By    Initials Name Provider Type    Kinsey Gomez PTA Physical Therapy Assistant                       PT OP Goals     Row Name 06/17/21 1300          PT Short Term Goals    STG Date to Achieve  06/28/21  -TL     STG 1  Pt independent with maintaining good sitting posture +/- lumbar support  -TL     STG 1 Progress  Ongoing;Progressing  -TL     STG 2  Improve R LE MMT to 4/5  -TL     STG 2 Progress  Ongoing;Progressing  -TL     STG 3  Reduce LBP by 50% with 50% reduction in B LE sciatica symptoms  -TL     STG 3 Progress  (S) Met  -TL     STG 4  Improve lumbar spine AROM to minimal limit 75%-(flex/ext)  -TL     STG 4 Progress  Ongoing  -TL        Long Term Goals    LTG Date to Achieve  06/28/21  -TL     LTG 1  Reduce LBP by 75% with 100% reduction in B LE sciatica symptoms  -TL     LTG 1 Progress  Not Met  -TL     LTG 2  Improve B LE MMT to 4+/5   -TL     LTG 2 Progress  Not Met  -TL     LTG 3  Improve lumbar spine AROM to WFL-all planes  -TL     LTG 3 Progress  Not Met  -TL        Time Calculation    PT Goal Re-Cert Due Date  06/26/21  -TL       User Key  (r) = Recorded By, (t) = Taken By, (c) = Cosigned By    Initials Name Provider Type    Kinsey Gomez PTA Physical Therapy Assistant          Therapy Education  Education Details: Continue HEP              Time Calculation:   Start Time: 1300  Stop Time: 1400  Time Calculation (min): 60 min  Timed Charges  49952 - PT Therapeutic Exercise Minutes: 45  Untimed Charges  PT E-Stim Unattended Minutes: 15  Total Minutes  Timed Charges Total Minutes: 45  Untimed Charges Total Minutes: 15   Total Minutes: 60                Kinsey Brown PTA  6/17/2021

## 2021-06-21 ENCOUNTER — APPOINTMENT (OUTPATIENT)
Dept: PHYSICAL THERAPY | Facility: HOSPITAL | Age: 21
End: 2021-06-21

## 2021-06-23 ENCOUNTER — APPOINTMENT (OUTPATIENT)
Dept: PHYSICAL THERAPY | Facility: HOSPITAL | Age: 21
End: 2021-06-23

## 2021-06-25 ENCOUNTER — HOSPITAL ENCOUNTER (OUTPATIENT)
Dept: PHYSICAL THERAPY | Facility: HOSPITAL | Age: 21
Setting detail: THERAPIES SERIES
Discharge: HOME | End: 2021-06-25

## 2021-06-25 DIAGNOSIS — M51.26 LUMBAR DISC HERNIATION: Primary | ICD-10-CM

## 2021-06-25 NOTE — THERAPY TREATMENT NOTE
Outpatient Physical Therapy Ortho Treatment Note  [facility]     Patient Name: Moriah Lazar  : 2000  MRN: [id number]  Today's Date: 2021      Visit Date: 2021  Subjective Improvement: 75%  MD visit: 21  Visit Number:   Total Approved:8 approved visit (4 more approved visit 21-21  Recert Date: 21  Visit Dx:    ICD-10-CM ICD-9-CM   1. Lumbar disc herniation  M51.26 722.10       Patient Active Problem List   Diagnosis   • Lumbar disc herniation   • Class 1 obesity due to excess calories with body mass index (BMI) of 34.0 to 34.9 in adult   • Former smoker        Past Medical History:   Diagnosis Date   • Allergic    • Anxiety    • Obesity    • Postural hypotension    • Urinary tract infection         No past surgical history on file.    PT Ortho     Row Name 21 1000       Subjective Comments    Subjective Comments  pt reported that she is 75% improved. Pt denies having pain down her legs now. Pt reports pain still localized in back. Pt declined estim and ice today. Pt reports that she does not think she needs it.  -TL       Precautions and Contraindications    Precautions/Limitations  no known precautions/limitations  -TL       Subjective Pain    Post-Treatment Pain Level  3  -TL       MMT (Manual Muscle Testing)    General MMT Comments  Right LE 4+/5 hip abd/add, hip flex 4+/5, knee ext 4/5, knee ham 4/5, left LE hip flex 4+/5, hip abd/add 4+/5, knee ext 4/5, knee ham4+/5  -TL      User Key  (r) = Recorded By, (t) = Taken By, (c) = Cosigned By    Initials Name Provider Type    Kinsey Gomez, PTA Physical Therapy Assistant                      PT Assessment/Plan     Row Name 21 1000          PT Assessment    Assessment Comments  pt has met 2 and 3 goals. Pt working better with upright posturing. pt has met MMT short term goal. See MMT tab. Pt denies leg pain. Pt reports 75% improved. Pt reports pain more localized in low back. pt still stays in  a foward PPT with gait.   -TL        PT Plan    PT Frequency  2x/week  -TL     Predicted Duration of Therapy Intervention (PT)  4 weeks  -TL     PT Plan Comments  recert next visit.  -TL       User Key  (r) = Recorded By, (t) = Taken By, (c) = Cosigned By    Initials Name Provider Type    Kinsey Gomez PTA Physical Therapy Assistant            OP Exercises     Row Name 06/25/21 1000             Subjective Comments    Subjective Comments  pt reported that she is 75% improved. Pt denies having pain down her legs now. Pt reports pain still localized in back. Pt declined estim and ice today. Pt reports that she does not think she needs it.  -TL         Subjective Pain    Able to rate subjective pain?  yes  -TL      Pre-Treatment Pain Level  3  -TL      Post-Treatment Pain Level  3  -TL         Total Minutes    60665 - PT Therapeutic Exercise Minutes  45  -TL         Exercise 1    Exercise Name 1  pro ll legs for strengthening  -TL      Time 1  10 mins  -TL      Additional Comments  level 3  -TL         Exercise 2    Exercise Name 2  st ham S  -TL      Sets 2  2  -TL      Time 2  30 sec hold  -TL      Additional Comments  both legs  -TL         Exercise 3    Exercise Name 3  seated pirif S  -TL      Sets 3  2  -TL      Time 3  30 sec hold  -TL      Additional Comments  both legs  -TL         Exercise 4    Exercise Name 4  seated trunk flexion/ 3-way s with pball  -TL      Reps 4  3  -TL      Time 4  30 sec each directions  -TL         Exercise 5    Exercise Name 5  bridging with ball  -TL      Sets 5  2  -TL      Reps 5  15  -TL      Time 5  5 sec hold  -TL         Exercise 6    Exercise Name 6  Dead Bug  -TL      Sets 6  2  -TL      Reps 6  10  -TL      Time 6  5 sec hold  -TL         Exercise 7    Exercise Name 7  manual HAM S  -TL      Sets 7  2  -TL      Time 7  30 sec hold  -TL      Additional Comments  much better  -TL         Exercise 8    Exercise Name 8  checked alignment   -TL      Additional Comments   equal  -TL        User Key  (r) = Recorded By, (t) = Taken By, (c) = Cosigned By    Initials Name Provider Type    Kinsey Gomez PTA Physical Therapy Assistant                       PT OP Goals     Row Name 06/25/21 1000          PT Short Term Goals    STG Date to Achieve  06/28/21  -TL     STG 1  Pt independent with maintaining good sitting posture +/- lumbar support  -TL     STG 1 Progress  Progressing  -TL     STG 2  Improve R LE MMT to 4/5  -TL     STG 2 Progress  Met  -TL     STG 3  Reduce LBP by 50% with 50% reduction in B LE sciatica symptoms  -TL     STG 3 Progress  Met  -TL     STG 4  Improve lumbar spine AROM to minimal limit 75%-(flex/ext)  -TL     STG 4 Progress  Ongoing;Progressing  -TL        Long Term Goals    LTG Date to Achieve  06/28/21  -TL     LTG 1  Reduce LBP by 75% with 100% reduction in B LE sciatica symptoms  -TL     LTG 1 Progress  Ongoing  -TL     LTG 2  Improve B LE MMT to 4+/5   -TL     LTG 2 Progress  Ongoing;Progressing  -TL     LTG 3  Improve lumbar spine AROM to WFL-all planes  -TL     LTG 3 Progress  Not Met  -TL        Time Calculation    PT Goal Re-Cert Due Date  06/26/21  -TL       User Key  (r) = Recorded By, (t) = Taken By, (c) = Cosigned By    Initials Name Provider Type    Kinsey Gomez PTA Physical Therapy Assistant                         Time Calculation:   Start Time: 1015  Stop Time: 1100  Time Calculation (min): 45 min  Timed Charges  81798 - PT Therapeutic Exercise Minutes: 45  Total Minutes  Timed Charges Total Minutes: 45   Total Minutes: 45                Kinsey Brown PTA  6/25/2021

## 2021-06-29 ENCOUNTER — APPOINTMENT (OUTPATIENT)
Dept: PHYSICAL THERAPY | Facility: HOSPITAL | Age: 21
End: 2021-06-29

## 2021-07-02 ENCOUNTER — OFFICE VISIT (OUTPATIENT)
Dept: FAMILY MEDICINE CLINIC | Facility: CLINIC | Age: 21
End: 2021-07-02

## 2021-07-02 VITALS
TEMPERATURE: 98.5 F | SYSTOLIC BLOOD PRESSURE: 128 MMHG | DIASTOLIC BLOOD PRESSURE: 82 MMHG | HEART RATE: 87 BPM | HEIGHT: 64 IN | BODY MASS INDEX: 32.59 KG/M2 | RESPIRATION RATE: 20 BRPM | WEIGHT: 190.9 LBS | OXYGEN SATURATION: 98 %

## 2021-07-02 DIAGNOSIS — F51.04 PSYCHOPHYSIOLOGICAL INSOMNIA: ICD-10-CM

## 2021-07-02 DIAGNOSIS — F41.9 ANXIETY: Primary | ICD-10-CM

## 2021-07-02 DIAGNOSIS — M51.26 LUMBAR DISC HERNIATION: ICD-10-CM

## 2021-07-02 PROCEDURE — 99214 OFFICE O/P EST MOD 30 MIN: CPT | Performed by: NURSE PRACTITIONER

## 2021-07-02 RX ORDER — DULOXETIN HYDROCHLORIDE 60 MG/1
60 CAPSULE, DELAYED RELEASE ORAL
COMMUNITY
Start: 2021-06-15 | End: 2022-04-14

## 2021-07-02 RX ORDER — NORGESTIMATE AND ETHINYL ESTRADIOL 7DAYSX3 28
KIT ORAL
COMMUNITY
Start: 2021-07-01 | End: 2021-11-18

## 2021-07-02 NOTE — PROGRESS NOTES
Subjective   Moriah Lazar is a 21 y.o. female.     CC: Anxiety, insomnia, back pain    Anxiety  Presents for follow-up visit. Patient reports no chest pain, depressed mood, insomnia, irritability, nausea, nervous/anxious behavior (improved), palpitations, panic, restlessness, shortness of breath or suicidal ideas. Symptoms occur rarely. The severity of symptoms is mild. The quality of sleep is good. Nighttime awakenings: occasional.     Compliance with medications is %.   Insomnia  This is a new problem. The current episode started more than 1 month ago. The problem occurs rarely. The problem has been resolved. Pertinent negatives include no abdominal pain, arthralgias, chest pain, chills, congestion, coughing, fatigue, fever, headaches, myalgias, nausea, numbness, rash, sore throat, vomiting or weakness. Nothing aggravates the symptoms. Treatments tried: Amitriptyline. The treatment provided significant relief.   Back Pain  This is a new problem. The current episode started more than 1 month ago. The problem occurs constantly. The problem has been rapidly improving since onset. The pain is present in the lumbar spine. The quality of the pain is described as aching. The pain does not radiate. The pain is at a severity of 2/10. The pain is mild. The symptoms are aggravated by twisting, standing, position and bending. Pertinent negatives include no abdominal pain, bladder incontinence, bowel incontinence, chest pain, dysuria, fever, headaches, leg pain, numbness, paresis, paresthesias, pelvic pain, perianal numbness, tingling, weakness or weight loss. Risk factors include obesity, poor posture, sedentary lifestyle and lack of exercise. She has tried NSAIDs (Amitriptyline, Cymbalta) for the symptoms. The treatment provided significant relief.        The following portions of the patient's history were reviewed and updated as appropriate: allergies, current medications, past family history, past medical  history, past social history, past surgical history and problem list.    Review of Systems   Constitutional: Negative for activity change, appetite change, chills, fatigue, fever, irritability, unexpected weight gain and unexpected weight loss.   HENT: Negative for congestion, sore throat, trouble swallowing and voice change.    Eyes: Negative.    Respiratory: Negative for cough, chest tightness, shortness of breath and wheezing.    Cardiovascular: Negative for chest pain, palpitations and leg swelling.   Gastrointestinal: Negative for abdominal pain, bowel incontinence, constipation, diarrhea, nausea and vomiting.   Endocrine: Negative.    Genitourinary: Negative for dysuria, pelvic pain and urinary incontinence.   Musculoskeletal: Positive for back pain (improving). Negative for arthralgias and myalgias.   Skin: Negative for rash.   Allergic/Immunologic: Negative.    Neurological: Negative for tingling, weakness, numbness and paresthesias.   Hematological: Negative.    Psychiatric/Behavioral: Negative for sleep disturbance (improved), suicidal ideas, depressed mood and stress. The patient is not nervous/anxious (improved) and does not have insomnia.        Objective   Physical Exam  Vitals and nursing note reviewed.   Constitutional:       General: She is not in acute distress.     Appearance: Normal appearance. She is well-developed. She is obese. She is not ill-appearing, toxic-appearing or diaphoretic.   HENT:      Head: Normocephalic and atraumatic.   Eyes:      Conjunctiva/sclera: Conjunctivae normal.   Cardiovascular:      Rate and Rhythm: Normal rate and regular rhythm.      Heart sounds: Normal heart sounds.   Pulmonary:      Effort: Pulmonary effort is normal. No respiratory distress.      Breath sounds: Normal breath sounds. No stridor. No wheezing, rhonchi or rales.   Musculoskeletal:         General: No tenderness. Normal range of motion.      Cervical back: Normal range of motion.   Skin:     General:  Skin is warm and dry.      Coloration: Skin is not pale.      Findings: No erythema or rash.   Neurological:      Mental Status: She is alert and oriented to person, place, and time.   Psychiatric:         Attention and Perception: Attention and perception normal.         Mood and Affect: Mood and affect normal.         Speech: Speech normal.         Behavior: Behavior normal. Behavior is cooperative.         Thought Content: Thought content normal. Thought content is not paranoid or delusional. Thought content does not include homicidal or suicidal ideation. Thought content does not include homicidal or suicidal plan.         Cognition and Memory: Cognition and memory normal.         Judgment: Judgment normal.           Assessment/Plan   Diagnoses and all orders for this visit:    1. Anxiety (Primary)   -Significant improvement since starting amitriptyline.  No suicidal homicidal ideations.  Tolerating medication well.  Continue amitriptyline as prescribed.  We will continue to monitor.    2. Psychophysiological insomnia   -Significant improvement since starting amitriptyline.  No reported adverse effects or excessive next-day drowsiness.  Continue amitriptyline as prescribed.  We will continue to monitor.    3. Lumbar disc herniation   -Pain improving with physical therapy, amitriptyline and the addition of Cymbalta by pain management.  Continue physical therapy and medications as prescribed.  Follow-up with neurosurgery as scheduled.    4.  Follow-up in 6 months or sooner for any acute needs.          This document has been electronically signed by DAVID Ye on July 2, 2021 17:14 CDT

## 2021-07-07 ENCOUNTER — APPOINTMENT (OUTPATIENT)
Dept: PHYSICAL THERAPY | Facility: HOSPITAL | Age: 21
End: 2021-07-07

## 2021-07-13 ENCOUNTER — APPOINTMENT (OUTPATIENT)
Dept: PHYSICAL THERAPY | Facility: HOSPITAL | Age: 21
End: 2021-07-13

## 2021-07-31 ENCOUNTER — HOSPITAL ENCOUNTER (EMERGENCY)
Facility: HOSPITAL | Age: 21
Discharge: HOME | End: 2021-07-31
Attending: FAMILY MEDICINE | Admitting: FAMILY MEDICINE

## 2021-07-31 VITALS
OXYGEN SATURATION: 98 % | BODY MASS INDEX: 32.78 KG/M2 | DIASTOLIC BLOOD PRESSURE: 61 MMHG | HEIGHT: 63 IN | RESPIRATION RATE: 18 BRPM | TEMPERATURE: 97.7 F | HEART RATE: 85 BPM | SYSTOLIC BLOOD PRESSURE: 105 MMHG | WEIGHT: 185 LBS

## 2021-07-31 DIAGNOSIS — G89.29 CHRONIC LOW BACK PAIN, UNSPECIFIED BACK PAIN LATERALITY, UNSPECIFIED WHETHER SCIATICA PRESENT: ICD-10-CM

## 2021-07-31 DIAGNOSIS — N39.0 URINARY TRACT INFECTION IN FEMALE: Primary | ICD-10-CM

## 2021-07-31 DIAGNOSIS — M54.50 CHRONIC LOW BACK PAIN, UNSPECIFIED BACK PAIN LATERALITY, UNSPECIFIED WHETHER SCIATICA PRESENT: ICD-10-CM

## 2021-07-31 LAB
B-HCG UR QL: NEGATIVE
BACTERIA UR QL AUTO: ABNORMAL /HPF
BILIRUB UR QL STRIP: NEGATIVE
CLARITY UR: ABNORMAL
COLOR UR: YELLOW
GLUCOSE UR STRIP-MCNC: NEGATIVE MG/DL
HGB UR QL STRIP.AUTO: NEGATIVE
HYALINE CASTS UR QL AUTO: ABNORMAL /LPF
KETONES UR QL STRIP: ABNORMAL
LEUKOCYTE ESTERASE UR QL STRIP.AUTO: ABNORMAL
NITRITE UR QL STRIP: POSITIVE
PH UR STRIP.AUTO: 6 [PH] (ref 5–9)
PROT UR QL STRIP: NEGATIVE
RBC # UR: ABNORMAL /HPF
REF LAB TEST METHOD: ABNORMAL
SP GR UR STRIP: 1.02 (ref 1–1.03)
SQUAMOUS #/AREA URNS HPF: ABNORMAL /HPF
UROBILINOGEN UR QL STRIP: ABNORMAL
WBC UR QL AUTO: ABNORMAL /HPF

## 2021-07-31 PROCEDURE — 99283 EMERGENCY DEPT VISIT LOW MDM: CPT

## 2021-07-31 PROCEDURE — 87077 CULTURE AEROBIC IDENTIFY: CPT | Performed by: NURSE PRACTITIONER

## 2021-07-31 PROCEDURE — 96372 THER/PROPH/DIAG INJ SC/IM: CPT

## 2021-07-31 PROCEDURE — 87186 SC STD MICRODIL/AGAR DIL: CPT | Performed by: NURSE PRACTITIONER

## 2021-07-31 PROCEDURE — 81025 URINE PREGNANCY TEST: CPT | Performed by: NURSE PRACTITIONER

## 2021-07-31 PROCEDURE — 81001 URINALYSIS AUTO W/SCOPE: CPT | Performed by: NURSE PRACTITIONER

## 2021-07-31 PROCEDURE — 25010000002 TRIAMCINOLONE PER 10 MG: Performed by: NURSE PRACTITIONER

## 2021-07-31 PROCEDURE — 25010000002 KETOROLAC TROMETHAMINE PER 15 MG: Performed by: FAMILY MEDICINE

## 2021-07-31 PROCEDURE — 87086 URINE CULTURE/COLONY COUNT: CPT | Performed by: NURSE PRACTITIONER

## 2021-07-31 RX ORDER — KETOROLAC TROMETHAMINE 15 MG/ML
15 INJECTION, SOLUTION INTRAMUSCULAR; INTRAVENOUS ONCE
Status: DISCONTINUED | OUTPATIENT
Start: 2021-07-31 | End: 2021-07-31

## 2021-07-31 RX ORDER — TRIAMCINOLONE ACETONIDE 40 MG/ML
40 INJECTION, SUSPENSION INTRA-ARTICULAR; INTRAMUSCULAR ONCE
Status: COMPLETED | OUTPATIENT
Start: 2021-07-31 | End: 2021-07-31

## 2021-07-31 RX ORDER — KETOROLAC TROMETHAMINE 30 MG/ML
30 INJECTION, SOLUTION INTRAMUSCULAR; INTRAVENOUS ONCE
Status: COMPLETED | OUTPATIENT
Start: 2021-07-31 | End: 2021-07-31

## 2021-07-31 RX ORDER — KETOROLAC TROMETHAMINE 30 MG/ML
30 INJECTION, SOLUTION INTRAMUSCULAR; INTRAVENOUS EVERY 6 HOURS PRN
Status: DISCONTINUED | OUTPATIENT
Start: 2021-07-31 | End: 2021-07-31

## 2021-07-31 RX ORDER — CEPHALEXIN 500 MG/1
500 CAPSULE ORAL 2 TIMES DAILY
Qty: 10 CAPSULE | Refills: 0 | Status: SHIPPED | OUTPATIENT
Start: 2021-07-31 | End: 2021-08-05

## 2021-07-31 RX ADMIN — TRIAMCINOLONE ACETONIDE 40 MG: 40 INJECTION, SUSPENSION INTRA-ARTICULAR; INTRAMUSCULAR at 11:51

## 2021-07-31 RX ADMIN — KETOROLAC TROMETHAMINE 30 MG: 30 INJECTION, SOLUTION INTRAMUSCULAR; INTRAVENOUS at 11:53

## 2021-08-02 LAB — BACTERIA SPEC AEROBE CULT: ABNORMAL

## 2021-08-04 NOTE — ED PROVIDER NOTES
Subjective   Patient presents to the ER with exacerbation of chronic back pain. She states about 3-4 months ago she woke up with spontaneous back pain that showed on MRI herniated discs. She has completed physical therapy and has seen Dr. Garcia. She states she is due for a steroid injection at her next pain clinic appointment. Medications include Flexeril, Cymbalta, and Amitriptaline for her pain. She states today her pain has worsened and she was having increased pain with walking. Denies numbness, loss of bowel or loss of bladder.           Review of Systems   Constitutional: Negative for chills and fever.   Respiratory: Negative.    Cardiovascular: Negative.    Gastrointestinal: Negative.         Denies loss of bowel   Genitourinary: Negative.         Denies loss of bladder   Musculoskeletal: Positive for back pain.   Skin: Negative.    Neurological: Positive for weakness (pain with walking). Negative for numbness.   Psychiatric/Behavioral: Negative.        Past Medical History:   Diagnosis Date   • Allergic    • Anxiety    • Obesity    • Postural hypotension    • Urinary tract infection        No Known Allergies    History reviewed. No pertinent surgical history.    Family History   Problem Relation Age of Onset   • Heart attack Father    • Heart disease Father    • Diabetes Mother    • Stroke Mother    • Schizophrenia Brother    • No Known Problems Son    • Hepatitis Maternal Grandmother    • Schizophrenia Maternal Grandmother    • Diabetes Paternal Grandmother    • Heart disease Paternal Grandfather    • Heart attack Paternal Grandfather    • Diabetes Paternal Grandfather    • No Known Problems Brother        Social History     Socioeconomic History   • Marital status:      Spouse name: Not on file   • Number of children: Not on file   • Years of education: Not on file   • Highest education level: High school graduate   Tobacco Use   • Smoking status: Current Every Day Smoker     Packs/day: 1.00      "Types: Cigarettes   • Smokeless tobacco: Never Used   Vaping Use   • Vaping Use: Former   • Substances: Flavoring   • Devices: Disposable, Pre-filled pod   Substance and Sexual Activity   • Alcohol use: Never   • Drug use: Never     Comment: former   • Sexual activity: Yes     Partners: Male     Birth control/protection: Pill           Objective    /61   Pulse 85   Temp 97.7 °F (36.5 °C) (Infrared)   Resp 18   Ht 160 cm (63\")   Wt 83.9 kg (185 lb)   SpO2 98%   BMI 32.77 kg/m²     Physical Exam  Vitals and nursing note reviewed.   Constitutional:       General: She is not in acute distress.     Appearance: She is well-developed. She is not ill-appearing.   HENT:      Head: Normocephalic and atraumatic.   Cardiovascular:      Rate and Rhythm: Normal rate and regular rhythm.      Heart sounds: Normal heart sounds. No murmur heard.     Pulmonary:      Effort: Pulmonary effort is normal. No respiratory distress.      Breath sounds: Normal breath sounds. No wheezing.   Abdominal:      General: Bowel sounds are normal. There is no distension.      Palpations: Abdomen is soft.      Tenderness: There is no abdominal tenderness.   Musculoskeletal:         General: Normal range of motion.      Cervical back: Normal range of motion and neck supple.      Comments: Slow with positional changes, tenderness with palpation lower back   Skin:     General: Skin is warm and dry.      Capillary Refill: Capillary refill takes less than 2 seconds.   Neurological:      Mental Status: She is alert and oriented to person, place, and time.      Motor: No weakness.      Coordination: Coordination normal.      Gait: Gait abnormal (pain with walking).      Deep Tendon Reflexes: Reflexes normal.   Psychiatric:         Behavior: Behavior normal.         Thought Content: Thought content normal.         Judgment: Judgment normal.         Procedures  Results for orders placed or performed during the hospital encounter of 07/31/21   Urine " Culture - Urine, Urine, Clean Catch    Specimen: Urine, Clean Catch   Result Value Ref Range    Urine Culture >100,000 CFU/mL Escherichia coli (A)        Susceptibility    Escherichia coli - ZACK     Ampicillin <=2 Susceptible ug/ml     Ampicillin + Sulbactam <=2 Susceptible ug/ml     Cefazolin <=4 Susceptible ug/ml     Cefepime <=1 Susceptible ug/ml     Ceftazidime <=1 Susceptible ug/ml     Ceftriaxone <=1 Susceptible ug/ml     Gentamicin <=1 Susceptible ug/ml     Levofloxacin 0.5 Susceptible ug/ml     Nitrofurantoin <=16 Susceptible ug/ml     Piperacillin + Tazobactam <=4 Susceptible ug/ml     Tetracycline <=1 Susceptible ug/ml     Trimethoprim + Sulfamethoxazole <=20 Susceptible ug/ml   Pregnancy, Urine - Urine, Clean Catch    Specimen: Urine, Clean Catch   Result Value Ref Range    HCG, Urine QL Negative Negative   Urinalysis With Microscopic If Indicated (No Culture) - Urine, Clean Catch    Specimen: Urine, Clean Catch   Result Value Ref Range    Color, UA Yellow Yellow, Straw, Dark Yellow, Mary    Appearance, UA Cloudy (A) Clear    pH, UA 6.0 5.0 - 9.0    Specific Gravity, UA 1.023 1.003 - 1.030    Glucose, UA Negative Negative    Ketones, UA Trace (A) Negative    Bilirubin, UA Negative Negative    Blood, UA Negative Negative    Protein, UA Negative Negative    Leuk Esterase, UA Small (1+) (A) Negative    Nitrite, UA Positive (A) Negative    Urobilinogen, UA 1.0 E.U./dL 0.2 - 1.0 E.U./dL   Urinalysis, Microscopic Only - Urine, Clean Catch    Specimen: Urine, Clean Catch   Result Value Ref Range    RBC, UA 0-2 (A) None Seen /HPF    WBC, UA 6-12 (A) None Seen, 0-2, 3-5 /HPF    Bacteria, UA 4+ (A) None Seen /HPF    Squamous Epithelial Cells, UA 6-12 (A) None Seen, 0-2 /HPF    Hyaline Casts, UA 13-20 None Seen /LPF    Methodology Automated Microscopy               ED Course                                           MDM    Final diagnoses:   Urinary tract infection in female   Chronic low back pain, unspecified  back pain laterality, unspecified whether sciatica present       ED Disposition  ED Disposition     ED Disposition Condition Comment    Discharge Stable           Ramon Vargas, APRN  [address] [place]  [facility] [address] [phone]    Schedule an appointment as soon as possible for a visit   ER follow up, Neurology referral, call Monday for appointment.         Medication List      New Prescriptions    cephalexin 500 MG capsule  Commonly known as: KEFLEX  Take 1 capsule by mouth 2 (Two) Times a Day for 5 days.           Where to Get Your Medications      These medications were sent to [facility]/pharmacy #[id number] - [place], [place] - [address] [place] [address] - [phone]  - [phone] [address] [address]    Phone: [phone]   · cephalexin 500 MG capsule          Lindsey Riggs, APRN  08/03/21 [id number]

## 2021-09-10 PROCEDURE — 87591 N.GONORRHOEAE DNA AMP PROB: CPT | Performed by: NURSE PRACTITIONER

## 2021-09-10 PROCEDURE — 87661 TRICHOMONAS VAGINALIS AMPLIF: CPT | Performed by: NURSE PRACTITIONER

## 2021-09-10 PROCEDURE — 87077 CULTURE AEROBIC IDENTIFY: CPT | Performed by: NURSE PRACTITIONER

## 2021-09-10 PROCEDURE — 87086 URINE CULTURE/COLONY COUNT: CPT | Performed by: NURSE PRACTITIONER

## 2021-09-10 PROCEDURE — 87491 CHLMYD TRACH DNA AMP PROBE: CPT | Performed by: NURSE PRACTITIONER

## 2021-09-10 PROCEDURE — 87186 SC STD MICRODIL/AGAR DIL: CPT | Performed by: NURSE PRACTITIONER

## 2021-10-04 ENCOUNTER — TELEPHONE (OUTPATIENT)
Dept: OBSTETRICS AND GYNECOLOGY | Facility: CLINIC | Age: 21
End: 2021-10-04

## 2021-10-04 NOTE — TELEPHONE ENCOUNTER
Patient understands condition, prognosis and need for follow up care. Pt called requesting a blood pregnancy test. I asked her has she had a postive home test? She said no. She then stated her periods are pretty normal but her cycle is about 6 days late. Spike Torres encouraged pt to take a home test and if it is positive we will order a hcg. Pt understood.

## 2021-11-02 ENCOUNTER — APPOINTMENT (OUTPATIENT)
Dept: CT IMAGING | Facility: HOSPITAL | Age: 21
End: 2021-11-02

## 2021-11-02 ENCOUNTER — TELEPHONE (OUTPATIENT)
Dept: NEUROSURGERY | Facility: CLINIC | Age: 21
End: 2021-11-02

## 2021-11-02 ENCOUNTER — HOSPITAL ENCOUNTER (EMERGENCY)
Facility: HOSPITAL | Age: 21
Discharge: HOME | End: 2021-11-02
Attending: EMERGENCY MEDICINE | Admitting: EMERGENCY MEDICINE

## 2021-11-02 VITALS
HEIGHT: 63 IN | RESPIRATION RATE: 18 BRPM | SYSTOLIC BLOOD PRESSURE: 126 MMHG | TEMPERATURE: 97.4 F | OXYGEN SATURATION: 99 % | DIASTOLIC BLOOD PRESSURE: 65 MMHG | BODY MASS INDEX: 32.96 KG/M2 | HEART RATE: 88 BPM | WEIGHT: 186 LBS

## 2021-11-02 DIAGNOSIS — M51.27 LUMBOSACRAL DISC HERNIATION: ICD-10-CM

## 2021-11-02 DIAGNOSIS — M48.07 SPINAL STENOSIS OF LUMBOSACRAL REGION: Primary | ICD-10-CM

## 2021-11-02 LAB
ALBUMIN SERPL-MCNC: 4.7 G/DL (ref 3.5–5.2)
ALBUMIN/GLOB SERPL: 2.6 G/DL
ALP SERPL-CCNC: 85 U/L (ref 39–117)
ALT SERPL W P-5'-P-CCNC: 14 U/L (ref 1–33)
ANION GAP SERPL CALCULATED.3IONS-SCNC: 9 MMOL/L (ref 5–15)
AST SERPL-CCNC: 13 U/L (ref 1–32)
BASOPHILS # BLD AUTO: 0.06 10*3/MM3 (ref 0–0.2)
BASOPHILS NFR BLD AUTO: 0.6 % (ref 0–1.5)
BILIRUB SERPL-MCNC: 0.2 MG/DL (ref 0–1.2)
BUN SERPL-MCNC: 12 MG/DL (ref 6–20)
BUN/CREAT SERPL: 19 (ref 7–25)
CALCIUM SPEC-SCNC: 9.4 MG/DL (ref 8.6–10.5)
CHLORIDE SERPL-SCNC: 102 MMOL/L (ref 98–107)
CO2 SERPL-SCNC: 25 MMOL/L (ref 22–29)
CREAT SERPL-MCNC: 0.63 MG/DL (ref 0.57–1)
DEPRECATED RDW RBC AUTO: 42.9 FL (ref 37–54)
EOSINOPHIL # BLD AUTO: 0.29 10*3/MM3 (ref 0–0.4)
EOSINOPHIL NFR BLD AUTO: 2.7 % (ref 0.3–6.2)
ERYTHROCYTE [DISTWIDTH] IN BLOOD BY AUTOMATED COUNT: 14.7 % (ref 12.3–15.4)
GFR SERPL CREATININE-BSD FRML MDRD: 120 ML/MIN/1.73
GLOBULIN UR ELPH-MCNC: 1.8 GM/DL
GLUCOSE SERPL-MCNC: 99 MG/DL (ref 65–99)
HCG SERPL QL: NEGATIVE
HCT VFR BLD AUTO: 37.6 % (ref 34–46.6)
HGB BLD-MCNC: 12.4 G/DL (ref 12–15.9)
IMM GRANULOCYTES # BLD AUTO: 0.05 10*3/MM3 (ref 0–0.05)
IMM GRANULOCYTES NFR BLD AUTO: 0.5 % (ref 0–0.5)
LYMPHOCYTES # BLD AUTO: 3.55 10*3/MM3 (ref 0.7–3.1)
LYMPHOCYTES NFR BLD AUTO: 32.6 % (ref 19.6–45.3)
MCH RBC QN AUTO: 26.6 PG (ref 26.6–33)
MCHC RBC AUTO-ENTMCNC: 33 G/DL (ref 31.5–35.7)
MCV RBC AUTO: 80.5 FL (ref 79–97)
MONOCYTES # BLD AUTO: 0.87 10*3/MM3 (ref 0.1–0.9)
MONOCYTES NFR BLD AUTO: 8 % (ref 5–12)
NEUTROPHILS NFR BLD AUTO: 55.6 % (ref 42.7–76)
NEUTROPHILS NFR BLD AUTO: 6.08 10*3/MM3 (ref 1.7–7)
NRBC BLD AUTO-RTO: 0 /100 WBC (ref 0–0.2)
PLATELET # BLD AUTO: 308 10*3/MM3 (ref 140–450)
PMV BLD AUTO: 9.6 FL (ref 6–12)
POTASSIUM SERPL-SCNC: 3.7 MMOL/L (ref 3.5–5.2)
PROT SERPL-MCNC: 6.5 G/DL (ref 6–8.5)
RBC # BLD AUTO: 4.67 10*6/MM3 (ref 3.77–5.28)
SODIUM SERPL-SCNC: 136 MMOL/L (ref 136–145)
WBC # BLD AUTO: 10.9 10*3/MM3 (ref 3.4–10.8)

## 2021-11-02 PROCEDURE — 80053 COMPREHEN METABOLIC PANEL: CPT | Performed by: EMERGENCY MEDICINE

## 2021-11-02 PROCEDURE — 96374 THER/PROPH/DIAG INJ IV PUSH: CPT

## 2021-11-02 PROCEDURE — 96372 THER/PROPH/DIAG INJ SC/IM: CPT

## 2021-11-02 PROCEDURE — 25010000002 MORPHINE PER 10 MG: Performed by: EMERGENCY MEDICINE

## 2021-11-02 PROCEDURE — 72131 CT LUMBAR SPINE W/O DYE: CPT

## 2021-11-02 PROCEDURE — 84703 CHORIONIC GONADOTROPIN ASSAY: CPT | Performed by: EMERGENCY MEDICINE

## 2021-11-02 PROCEDURE — 99283 EMERGENCY DEPT VISIT LOW MDM: CPT

## 2021-11-02 PROCEDURE — 85025 COMPLETE CBC W/AUTO DIFF WBC: CPT | Performed by: EMERGENCY MEDICINE

## 2021-11-02 PROCEDURE — 36415 COLL VENOUS BLD VENIPUNCTURE: CPT

## 2021-11-02 PROCEDURE — 25010000002 METHYLPREDNISOLONE PER 125 MG: Performed by: EMERGENCY MEDICINE

## 2021-11-02 RX ORDER — CYCLOBENZAPRINE HCL 10 MG
10 TABLET ORAL 3 TIMES DAILY PRN
Qty: 15 TABLET | Refills: 0 | Status: SHIPPED | OUTPATIENT
Start: 2021-11-02 | End: 2022-04-14

## 2021-11-02 RX ORDER — METHYLPREDNISOLONE SODIUM SUCCINATE 125 MG/2ML
80 INJECTION, POWDER, LYOPHILIZED, FOR SOLUTION INTRAMUSCULAR; INTRAVENOUS ONCE
Status: COMPLETED | OUTPATIENT
Start: 2021-11-02 | End: 2021-11-02

## 2021-11-02 RX ORDER — SODIUM CHLORIDE 0.9 % (FLUSH) 0.9 %
10 SYRINGE (ML) INJECTION AS NEEDED
Status: DISCONTINUED | OUTPATIENT
Start: 2021-11-02 | End: 2021-11-02 | Stop reason: HOSPADM

## 2021-11-02 RX ORDER — LIDOCAINE 50 MG/G
1 PATCH TOPICAL ONCE
Status: DISCONTINUED | OUTPATIENT
Start: 2021-11-02 | End: 2021-11-02 | Stop reason: HOSPADM

## 2021-11-02 RX ORDER — HYDROCODONE BITARTRATE AND ACETAMINOPHEN 5; 325 MG/1; MG/1
1 TABLET ORAL ONCE
Status: COMPLETED | OUTPATIENT
Start: 2021-11-02 | End: 2021-11-02

## 2021-11-02 RX ORDER — HYDROCODONE BITARTRATE AND ACETAMINOPHEN 5; 325 MG/1; MG/1
1 TABLET ORAL EVERY 6 HOURS PRN
Qty: 12 TABLET | Refills: 0 | Status: SHIPPED | OUTPATIENT
Start: 2021-11-02 | End: 2021-11-05 | Stop reason: DRUGHIGH

## 2021-11-02 RX ORDER — PREDNISONE 20 MG/1
20 TABLET ORAL DAILY
Qty: 5 TABLET | Refills: 0 | Status: SHIPPED | OUTPATIENT
Start: 2021-11-02 | End: 2021-11-07

## 2021-11-02 RX ORDER — CYCLOBENZAPRINE HCL 10 MG
10 TABLET ORAL ONCE
Status: COMPLETED | OUTPATIENT
Start: 2021-11-02 | End: 2021-11-02

## 2021-11-02 RX ADMIN — MORPHINE SULFATE 4 MG: 4 INJECTION INTRAVENOUS at 02:32

## 2021-11-02 RX ADMIN — CYCLOBENZAPRINE 10 MG: 10 TABLET, FILM COATED ORAL at 02:31

## 2021-11-02 RX ADMIN — HYDROCODONE BITARTRATE AND ACETAMINOPHEN 1 TABLET: 5; 325 TABLET ORAL at 04:44

## 2021-11-02 RX ADMIN — METHYLPREDNISOLONE SODIUM SUCCINATE 80 MG: 125 INJECTION, POWDER, FOR SOLUTION INTRAMUSCULAR; INTRAVENOUS at 04:44

## 2021-11-02 RX ADMIN — LIDOCAINE 1 PATCH: 50 PATCH CUTANEOUS at 02:31

## 2021-11-02 NOTE — DISCHARGE INSTRUCTIONS
Please return with new or worsening symptoms. Follow-up with your neurosurgeon. Medications have been sent to the pharmacy to help with symptoms of the next 2 days.

## 2021-11-02 NOTE — ED TRIAGE NOTES
Pt presents to ED with complaints of pain near her coccyx and shooting pains down right leg. Pt states she has a hx of a herniated disc in L4 and L5. Pt states that she went to chiropractor today and had zero relief. Pt tearful at triage, states pain is 10 when attempting to lay down. VSS. NAd noted. A&OX4.

## 2021-11-02 NOTE — TELEPHONE ENCOUNTER
PATIENT WAS SEEN IN THE ER TODAY 11/2/21 AND HER DISCHARGE INSTRUCTIONS SAY TO FOLLOW UP WITH DR ALVARES.     PT IS ESTABLISHED WITH THE OFFICE.     DOES PATIENT NEED TO SEE DR ALVARES OR CAN THEY SEE VALERIANO AND WHAT TYPE OF TIMELINE DO WE NEED TO SEE HER?     PATIENT  STATES THE ER TOLD THEM ASAP WITH OUR OFFICE.

## 2021-11-02 NOTE — ED NOTES
Discharge instructions reviewed with patient. Patient verbalized understanding. Pt wheeled to lobby to wait for family. VSS. NAD noted. A&Ox4.      Cheikh Mcguire, RN  11/02/21 7171

## 2021-11-02 NOTE — ED PROVIDER NOTES
Subjective   21-year-old female presents the emergency department with complaint of back and right leg pain.  She reports pain starts in her coccyx and shoots down the right leg.  Also some numbness in her right toes.  Reports she has history of herniated disc but had previously been feeling better.  Denies any sudden onset of the symptoms or what may have caused them in no fall or trauma.  She reports she tried to go to the chiropractor today and felt better when she was on the inversion table but afterwards had 0 improvement.  Denies any loss of bowel or bladder control or saddle anesthesia. Denies weakness or falls.     Family history, surgical history, social history, current medications and allergies are reviewed with the patient and triage documentation and vitals are reviewed.      History provided by:  Patient and medical records   used: No        Review of Systems   Constitutional: Negative for chills and fever.   HENT: Negative for congestion and sore throat.    Eyes: Negative for photophobia and visual disturbance.   Respiratory: Negative for cough, shortness of breath and wheezing.    Cardiovascular: Negative for palpitations and leg swelling.   Gastrointestinal: Negative for abdominal pain, diarrhea, nausea and vomiting.   Endocrine: Negative for polydipsia, polyphagia and polyuria.   Genitourinary: Negative for decreased urine volume, difficulty urinating, dysuria, frequency and urgency.   Musculoskeletal: Positive for back pain. Negative for arthralgias, myalgias and neck pain.   Skin: Negative for rash and wound.   Allergic/Immunologic: Negative.    Neurological: Positive for numbness. Negative for facial asymmetry, speech difficulty, weakness, light-headedness and headaches.   Hematological: Negative.    Psychiatric/Behavioral: Negative.        Past Medical History:   Diagnosis Date   • Allergic    • Anxiety    • Obesity    • Postural hypotension    • Urinary tract infection         No Known Allergies    History reviewed. No pertinent surgical history.    Family History   Problem Relation Age of Onset   • Heart attack Father    • Heart disease Father    • Diabetes Mother    • Stroke Mother    • Schizophrenia Brother    • No Known Problems Son    • Hepatitis Maternal Grandmother    • Schizophrenia Maternal Grandmother    • Diabetes Paternal Grandmother    • Heart disease Paternal Grandfather    • Heart attack Paternal Grandfather    • Diabetes Paternal Grandfather    • No Known Problems Brother        Social History     Socioeconomic History   • Marital status:    • Highest education level: High school graduate   Tobacco Use   • Smoking status: Current Every Day Smoker     Packs/day: 0.50     Types: Cigarettes   • Smokeless tobacco: Never Used   Vaping Use   • Vaping Use: Former   • Substances: Flavoring   • Devices: Disposable, Pre-filled pod   Substance and Sexual Activity   • Alcohol use: Never   • Drug use: Never     Comment: former   • Sexual activity: Yes     Partners: Male     Birth control/protection: Pill           Objective   Physical Exam  Vitals and nursing note reviewed.   Constitutional:       General: She is not in acute distress.     Appearance: Normal appearance. She is normal weight. She is not ill-appearing, toxic-appearing or diaphoretic.   HENT:      Head: Normocephalic.   Eyes:      Conjunctiva/sclera: Conjunctivae normal.      Pupils: Pupils are equal, round, and reactive to light.   Cardiovascular:      Rate and Rhythm: Normal rate and regular rhythm.      Pulses: Normal pulses.      Heart sounds: No murmur heard.      Pulmonary:      Effort: Pulmonary effort is normal.      Breath sounds: Normal breath sounds. No wheezing.   Abdominal:      General: Bowel sounds are normal.      Palpations: Abdomen is soft.      Tenderness: There is no abdominal tenderness.   Musculoskeletal:         General: Normal range of motion.      Cervical back: Normal range of  motion.   Skin:     General: Skin is warm and dry.      Capillary Refill: Capillary refill takes less than 2 seconds.   Neurological:      General: No focal deficit present.      Mental Status: She is alert and oriented to person, place, and time.      Cranial Nerves: No cranial nerve deficit.      Sensory: No sensory deficit.      Motor: No weakness.      Coordination: Coordination normal.      Gait: Gait normal.      Deep Tendon Reflexes: Reflexes normal.      Comments: 5/5 strength bilateral lower extremities   Psychiatric:         Attention and Perception: Attention normal.         Mood and Affect: Affect is tearful.         Speech: Speech normal.         Behavior: Behavior normal.         Procedures  none         ED Course      Labs Reviewed   CBC WITH AUTO DIFFERENTIAL - Abnormal; Notable for the following components:       Result Value    WBC 10.90 (*)     Lymphocytes, Absolute 3.55 (*)     All other components within normal limits   HCG, SERUM, QUALITATIVE - Normal   COMPREHENSIVE METABOLIC PANEL    Narrative:     GFR Normal >60  Chronic Kidney Disease <60  Kidney Failure <15     CBC AND DIFFERENTIAL    Narrative:     The following orders were created for panel order CBC & Differential.  Procedure                               Abnormality         Status                     ---------                               -----------         ------                     CBC Auto Differential[[id number]]        Abnormal            Final result                 Please view results for these tests on the individual orders.     CT Lumbar Spine Without Contrast    Result Date: 11/2/2021  Narrative: EXAM DESCRIPTION: CT LUMBAR SPINE WO CONTRAST CLINICAL HISTORY: 21 years Female, pain, numbness, no known injury TECHNIQUE: Helical CT axial imaging of the lumbar spine without IV contrast. Multiplanar reconstruction.  This exam was performed according to our departmental dose-optimization program, which includes automated exposure  control, adjustment of the mA and/or kV according to patient size and/or use of iterative reconstruction technique. . COMPARISON: MRI of the lumbar spine from May 3, 2021. For my comparison to the CT scan, and the MRI scan, I believe the numbering on the MRI scan is incorrect. The CT scan has the benefit of the coronal imaging. Therefore, on the MRI report discusses and L4-L5 posterior central broad-based disc extrusion, I think that it is actually at the L5-S1 level based on my current counting on the current CT scan of the lumbar spine. FINDINGS: VERTEBRA: Minimal, gentle levoscoliosis from L2 to S1. The S1 vertebral body is heavily lumbarized with incomplete fusion of its transverse processes with the remainder of the sacrum. No acute fracture or subluxation. Lumbar vertebrae are normal in height.  Normal vertebral body morphology. DISCS: Intervertebral discs are well-preserved. LEVELS: T12-L1: No significant canal stenosis or neural foraminal stenosis. L1-2: No significant canal stenosis or neural foraminal stenosis. L2-3: No significant canal stenosis or neural foraminal stenosis. L3-4: No significant canal stenosis or neural foraminal stenosis. L4-5: No significant canal stenosis or neural foraminal stenosis. L5-S1: Disc extrusion, facet arthropathy, ligamental thickening contributing to spinal canal stenosis at the same level as was demonstrated on the MRI of May 3, 2021. Accounting for differences in contrast resolution on the current study compared to the previous study it is difficult to assess if today's exam demonstrates a greater spinal canal stenosis or unchanged. I favor that it might be a greater spinal canal stenosis with a larger extruded disc fragment SOFT TISSUES: Paravertebral soft tissues are unremarkable.     Impression: The CT scan have the benefit of coronal images with good visualization of the bone. With these additional series, not available on the MRI, I believe that the T no lumbar  vertebral bodies as reported on the MRI of the lumbar spine from May 3, 2021 is by one level. Therefore by my counting, the patient has 5 nonrib-bearing lumbar vertebral bodies, but with a heavily lumbarized S1 vertebral body. By my counting the extruded disc demonstrated on the May 3, 2021 study is not at the L4-L5 level as reported on the previous study, but at the L5-S1 level. 1.  Accounting for differences in contrast resolution on the current study it is difficult to assess if today's exam demonstrates a greater spinal canal stenosis with a larger extruded disc fragment at the L5-S1 level as seen on the MRI of May 3, 2021. I favor that it might be a greater spinal canal stenosis with a larger extruded disc fragment. 2.  Minimal, gentle levoscoliosis from L2 to S1. Electronically signed by:  Karl Cardona MD  11/2/2021 4:12 AM CDT Workstation: [id number]          MDM  Number of Diagnoses or Management Options     Amount and/or Complexity of Data Reviewed  Clinical lab tests: reviewed  Tests in the radiology section of CPT®: reviewed    Patient Progress  Patient progress: stable    Patient has some improvement with medications in the emergency department.  She has no focal neurologic deficit on exam.  No acute neurologic concerns.  CT evidence of possible worsening herniation that appears to be more at L5-S1 than L4-L5 as previously thought.  She is started on a short course of steroid to help with inflammation and discomfort and as well as short course of pain and muscle relaxer medication advised on close follow-up back with her neurosurgeon.  Reasons to return to the emergency department discussed.  Agreeable to discharge and plan.    Final diagnoses:   Spinal stenosis of lumbosacral region   Lumbosacral disc herniation       ED Disposition  ED Disposition     ED Disposition Condition Comment    Discharge Stable           Bony Orellana MD  [address] [facility] [address]  [facility]  [address]  [phone]               Medication List      New Prescriptions    HYDROcodone-acetaminophen 5-325 MG per tablet  Commonly known as: NORCO  Take 1 tablet by mouth Every 6 (Six) Hours As Needed for Moderate Pain  for up to 3 days.     predniSONE 20 MG tablet  Commonly known as: DELTASONE  Take 1 tablet by mouth Daily for 5 days.           Where to Get Your Medications      These medications were sent to [place], [place] - [facility] [phone] [facility] [phone] [address] [address]    Phone: [phone]   · cyclobenzaprine 10 MG tablet  · HYDROcodone-acetaminophen 5-325 MG per tablet  · predniSONE 20 MG tablet          Parth Nicole DO  11/02/21 0554

## 2021-11-04 ENCOUNTER — OFFICE VISIT (OUTPATIENT)
Dept: NEUROSURGERY | Facility: CLINIC | Age: 21
End: 2021-11-04

## 2021-11-04 ENCOUNTER — TELEPHONE (OUTPATIENT)
Dept: NEUROSURGERY | Facility: CLINIC | Age: 21
End: 2021-11-04

## 2021-11-04 VITALS
WEIGHT: 183.2 LBS | HEIGHT: 64 IN | SYSTOLIC BLOOD PRESSURE: 118 MMHG | BODY MASS INDEX: 31.28 KG/M2 | DIASTOLIC BLOOD PRESSURE: 60 MMHG

## 2021-11-04 DIAGNOSIS — M51.26 LUMBAR DISC HERNIATION: Primary | ICD-10-CM

## 2021-11-04 DIAGNOSIS — F17.200 SMOKER: ICD-10-CM

## 2021-11-04 DIAGNOSIS — E66.09 CLASS 1 OBESITY DUE TO EXCESS CALORIES WITH BODY MASS INDEX (BMI) OF 31.0 TO 31.9 IN ADULT, UNSPECIFIED WHETHER SERIOUS COMORBIDITY PRESENT: ICD-10-CM

## 2021-11-04 PROCEDURE — 99213 OFFICE O/P EST LOW 20 MIN: CPT | Performed by: NURSE PRACTITIONER

## 2021-11-04 RX ORDER — DICLOFENAC SODIUM 75 MG/1
75 TABLET, DELAYED RELEASE ORAL 2 TIMES DAILY
Qty: 60 TABLET | Refills: 2 | Status: SHIPPED | OUTPATIENT
Start: 2021-11-04 | End: 2022-04-14

## 2021-11-04 NOTE — TELEPHONE ENCOUNTER
"Patient was seen today in our clinic by Demian HERNANDEZ.  At 3:01 pm I received an email from [facility] administration stating the patient's  called and stated:     I just received a call from Juan Nicole,  of [facility].  They are not happy with their visit with Dr. Orellana this afternoon.  I explained that our office handles the hospital side and that I would contact you all. (this was from Xiomara Ribera)    I talked with Demian and Briseyda & it was suggested that Demian contact the patient's  to discuss this matter.  I sat in on the conversation that took place with the patient's .    The  stated the patient was upset because she was told she would have to do therapy prior to doing an MRI, she wasn't offered any medications, and didn't feel that anyone listened to her.  He stated it seemed that we are more worried about insurance than the patient's complaint. The  stated they were considering going elsewhere for another opinion because they just didn't feel like they got the treatment they should have when they have been @ [facility].  He stated she told him that she couldn't even get the MRI until March 2022.  Demian listened to the  but stated that this is incorrect and that is not what the patient was told.  She was offered Diclofenac which she refused stating she would just take her Ibuprofen (she also got [place] from the ER 2 days ago), an MRI was ordered, and an appointment was given for follow up w/Dr Orellana (it is sched for Mar 2022 but patient was told that once MRI is complete and reviewed that if she needed to be seen sooner she would be brought in sooner).  The patient's  apologized stating he only heard his wife's side of the story and he should have called hear our side of the story.  He stated \"she gets all up in her feelings\" and was just upset.  He then asked what medication would Demian be giving her and if he could give it to " her now.  Demian told the  that if they wanted to sign a waiver to get the MRI done prior to insurance approval they could choose to do that but the  didn't want to do that.  Demian asked him to give us a few days to see if the MRI would be approved and the  agreed they would.  Diclofenac was sent to the pharmacy for the patient.    Present for this conversation was:  Demian Potter CMA, Physician Lead  KYE Harrison CMA

## 2021-11-04 NOTE — PATIENT INSTRUCTIONS
"BMI for Adults  What is BMI?  Body mass index (BMI) is a number that is calculated from a person's weight and height. BMI can help estimate how much of a person's weight is composed of fat. BMI does not measure body fat directly. Rather, it is an alternative to procedures that directly measure body fat, which can be difficult and expensive.  BMI can help identify people who may be at higher risk for certain medical problems.  What are BMI measurements used for?  BMI is used as a screening tool to identify possible weight problems. It helps determine whether a person is obese, overweight, a healthy weight, or underweight.  BMI is useful for:  · Identifying a weight problem that may be related to a medical condition or may increase the risk for medical problems.  · Promoting changes, such as changes in diet and exercise, to help reach a healthy weight. BMI screening can be repeated to see if these changes are working.  How is BMI calculated?  BMI involves measuring your weight in relation to your height. Both height and weight are measured, and the BMI is calculated from those numbers. This can be done either in English ([place] or metric measurements. Note that charts and online BMI calculators are available to help you find your BMI quickly and easily without having to do these calculations yourself.  To calculate your BMI in English ([place] measurements:    1. Measure your weight in pounds (lb).  2. Multiply the number of pounds by 703.  ? For example, for a person who weighs 180 lb, multiply that number by 703, which equals 126,540.  3. Measure your height in inches. Then multiply that number by itself to get a measurement called \"inches squared.\"  ? For example, for a person who is 70 inches tall, the \"inches squared\" measurement is 70 inches x 70 inches, which equals 4,900 inches squared.  4. Divide the total from step 2 (number of lb x 703) by the total from step 3 (inches squared): 126,540 ÷ 4,900 = 25.8. This is " "your BMI.    To calculate your BMI in metric measurements:  1. Measure your weight in kilograms (kg).  2. Measure your height in meters (m). Then multiply that number by itself to get a measurement called \"meters squared.\"  ? For example, for a person who is 1.75 m tall, the \"meters squared\" measurement is 1.75 m x 1.75 m, which is equal to 3.1 meters squared.  3. Divide the number of kilograms (your weight) by the meters squared number. In this example: 70 ÷ 3.1 = 22.6. This is your BMI.  What do the results mean?  BMI charts are used to identify whether you are underweight, normal weight, overweight, or obese. The following guidelines will be used:  · Underweight: BMI less than 18.5.  · Normal weight: BMI between 18.5 and 24.9.  · Overweight: BMI between 25 and 29.9.  · Obese: BMI of 30 or above.  Keep these notes in mind:  · Weight includes both fat and muscle, so someone with a muscular build, such as an athlete, may have a BMI that is higher than 24.9. In cases like these, BMI is not an accurate measure of body fat.  · To determine if excess body fat is the cause of a BMI of 25 or higher, further assessments may need to be done by a health care provider.  · BMI is usually interpreted in the same way for men and women.  Where to find more information  For more information about BMI, including tools to quickly calculate your BMI, go to these websites:  · Centers for Disease Control and Prevention: www.cdc.gov  · American Heart Association: www.heart.org  · National Heart, Lung, and Blood [place]: www.nhlbi.nih.gov  Summary  · Body mass index (BMI) is a number that is calculated from a person's weight and height.  · BMI may help estimate how much of a person's weight is composed of fat. BMI can help identify those who may be at higher risk for certain medical problems.  · BMI can be measured using English measurements or metric measurements.  · BMI charts are used to identify whether you are underweight, normal " "weight, overweight, or obese.  This information is not intended to replace advice given to you by your health care provider. Make sure you discuss any questions you have with your health care provider.  Document Revised: 09/09/2020 Document Reviewed: 07/17/2020  [employer] Patient Education © 2021 [employer]      https://www.nhlbi.nih.gov/files/docs/public/heart/dash_brief.pdf\">   DASH Eating Plan  DASH stands for Dietary Approaches to Stop Hypertension. The DASH eating plan is a healthy eating plan that has been shown to:  · Reduce high blood pressure (hypertension).  · Reduce your risk for type 2 diabetes, heart disease, and stroke.  · Help with weight loss.  What are tips for following this plan?  Reading food labels  · Check food labels for the amount of salt (sodium) per serving. Choose foods with less than 5 percent of the Daily Value of sodium. Generally, foods with less than 300 milligrams (mg) of sodium per serving fit into this eating plan.  · To find whole grains, look for the word \"whole\" as the first word in the ingredient list.  Shopping  · Buy products labeled as \"low-sodium\" or \"no salt added.\"  · Buy fresh foods. Avoid canned foods and pre-made or frozen meals.  Cooking  · Avoid adding salt when cooking. Use salt-free seasonings or herbs instead of table salt or sea salt. Check with your health care provider or pharmacist before using salt substitutes.  · Do not ely foods. Cook foods using healthy methods such as baking, boiling, grilling, roasting, and broiling instead.  · Cook with heart-healthy oils, such as olive, canola, avocado, soybean, or sunflower oil.  Meal planning    · Eat a balanced diet that includes:  ? 4 or more servings of fruits and 4 or more servings of vegetables each day. Try to fill one-half of your plate with fruits and vegetables.  ? 6-8 servings of whole grains each day.  ? Less than 6 oz (170 g) of lean meat, poultry, or fish each day. A 3-oz (85-g) serving of meat is " about the same size as a deck of cards. One egg equals 1 oz (28 g).  ? 2-3 servings of low-fat dairy each day. One serving is 1 cup (237 mL).  ? 1 serving of nuts, seeds, or beans 5 times each week.  ? 2-3 servings of heart-healthy fats. Healthy fats called omega-3 fatty acids are found in foods such as walnuts, flaxseeds, fortified milks, and eggs. These fats are also found in cold-water fish, such as sardines, salmon, and mackerel.  · Limit how much you eat of:  ? Canned or prepackaged foods.  ? Food that is high in trans fat, such as some fried foods.  ? Food that is high in saturated fat, such as fatty meat.  ? Desserts and other sweets, sugary drinks, and other foods with added sugar.  ? Full-fat dairy products.  · Do not salt foods before eating.  · Do not eat more than 4 egg yolks a week.  · Try to eat at least 2 vegetarian meals a week.  · Eat more home-cooked food and less restaurant, buffet, and fast food.    Lifestyle  · When eating at a restaurant, ask that your food be prepared with less salt or no salt, if possible.  · If you drink alcohol:  ? Limit how much you use to:  § 0-1 drink a day for women who are not pregnant.  § 0-2 drinks a day for men.  ? Be aware of how much alcohol is in your drink. In the [place] one drink equals one 12 oz bottle of beer (355 mL), one 5 oz glass of wine (148 mL), or one 1½ oz glass of hard liquor (44 mL).  General information  · Avoid eating more than 2,300 mg of salt a day. If you have hypertension, you may need to reduce your sodium intake to 1,500 mg a day.  · Work with your health care provider to maintain a healthy body weight or to lose weight. Ask what an ideal weight is for you.  · Get at least 30 minutes of exercise that causes your heart to beat faster (aerobic exercise) most days of the week. Activities may include walking, swimming, or biking.  · Work with your health care provider or dietitian to adjust your eating plan to your individual calorie needs.  What  foods should I eat?  Fruits  All fresh, dried, or frozen fruit. Canned fruit in natural juice (without added sugar).  Vegetables  Fresh or frozen vegetables (raw, steamed, roasted, or grilled). Low-sodium or reduced-sodium tomato and vegetable juice. Low-sodium or reduced-sodium tomato sauce and tomato paste. Low-sodium or reduced-sodium canned vegetables.  Grains  Whole-grain or whole-wheat bread. Whole-grain or whole-wheat pasta. Brown rice. Oatmeal. Quinoa. Bulgur. Whole-grain and low-sodium cereals. Gifty bread. Low-fat, low-sodium crackers. Whole-wheat flour tortillas.  Meats and other proteins  Skinless chicken or turkey. Ground chicken or turkey. Pork with fat trimmed off. Fish and seafood. Egg whites. Dried beans, peas, or lentils. Unsalted nuts, nut butters, and seeds. Unsalted canned beans. Lean cuts of beef with fat trimmed off. Low-sodium, lean precooked or cured meat, such as sausages or meat loaves.  Dairy  Low-fat (1%) or fat-free (skim) milk. Reduced-fat, low-fat, or fat-free cheeses. Nonfat, low-sodium ricotta or cottage cheese. Low-fat or nonfat yogurt. Low-fat, low-sodium cheese.  Fats and oils  Soft margarine without trans fats. Vegetable oil. Reduced-fat, low-fat, or light mayonnaise and salad dressings (reduced-sodium). Canola, safflower, olive, avocado, soybean, and sunflower oils. Avocado.  Seasonings and condiments  Herbs. Spices. Seasoning mixes without salt.  Other foods  Unsalted popcorn and pretzels. Fat-free sweets.  The items listed above may not be a complete list of foods and beverages you can eat. Contact a dietitian for more information.  What foods should I avoid?  Fruits  Canned fruit in a light or heavy syrup. Fried fruit. Fruit in cream or butter sauce.  Vegetables  Creamed or fried vegetables. Vegetables in a cheese sauce. Regular canned vegetables (not low-sodium or reduced-sodium). Regular canned tomato sauce and paste (not low-sodium or reduced-sodium). Regular tomato and  vegetable juice (not low-sodium or reduced-sodium). Pickles. Olives.  Grains  Baked goods made with fat, such as croissants, muffins, or some breads. Dry pasta or rice meal packs.  Meats and other proteins  Fatty cuts of meat. Ribs. Fried meat. Guerra. Bologna, salami, and other precooked or cured meats, such as sausages or meat loaves. Fat from the back of a pig (fatback). Bratwurst. Salted nuts and seeds. Canned beans with added salt. Canned or smoked fish. Whole eggs or egg yolks. Chicken or turkey with skin.  Dairy  Whole or 2% milk, cream, and half-and-half. Whole or full-fat cream cheese. Whole-fat or sweetened yogurt. Full-fat cheese. Nondairy creamers. Whipped toppings. Processed cheese and cheese spreads.  Fats and oils  Butter. Stick margarine. Lard. Shortening. Ghee. Guerra fat. Tropical oils, such as coconut, palm kernel, or palm oil.  Seasonings and condiments  Onion salt, garlic salt, seasoned salt, table salt, and sea salt. Worcestershire sauce. Tartar sauce. Barbecue sauce. Teriyaki sauce. Soy sauce, including reduced-sodium. Steak sauce. Canned and packaged gravies. Fish sauce. Oyster sauce. Cocktail sauce. Store-bought horseradish. Ketchup. Mustard. Meat flavorings and tenderizers. Bouillon cubes. Hot sauces. Pre-made or packaged marinades. Pre-made or packaged taco seasonings. Relishes. Regular salad dressings.  Other foods  Salted popcorn and pretzels.  The items listed above may not be a complete list of foods and beverages you should avoid. Contact a dietitian for more information.  Where to find more information  · National Heart, Lung, and Blood [place]: www.nhlbi.nih.gov  · American Heart Association: www.heart.org  · Academy of Nutrition and Dietetics: www.eatright.org  · National Kidney Foundation: www.kidney.org  Summary  · The DASH eating plan is a healthy eating plan that has been shown to reduce high blood pressure (hypertension). It may also reduce your risk for type 2 diabetes, heart  disease, and stroke.  · When on the DASH eating plan, aim to eat more fresh fruits and vegetables, whole grains, lean proteins, low-fat dairy, and heart-healthy fats.  · With the DASH eating plan, you should limit salt (sodium) intake to 2,300 mg a day. If you have hypertension, you may need to reduce your sodium intake to 1,500 mg a day.  · Work with your health care provider or dietitian to adjust your eating plan to your individual calorie needs.  This information is not intended to replace advice given to you by your health care provider. Make sure you discuss any questions you have with your health care provider.  Document Revised: 11/20/2020 Document Reviewed: 11/20/2020  [employer] Patient Education © 2021 [employer]      Steps to Quit Smoking  Smoking tobacco is the leading cause of preventable death. It can affect almost every organ in the body. Smoking puts you and people around you at risk for many serious, long-lasting (chronic) diseases. Quitting smoking can be hard, but it is one of the best things that you can do for your health. It is never too late to quit.  How do I get ready to quit?  When you decide to quit smoking, make a plan to help you succeed. Before you quit:  · Pick a date to quit. Set a date within the next 2 weeks to give you time to prepare.  · Write down the reasons why you are quitting. Keep this list in places where you will see it often.  · Tell your family, friends, and co-workers that you are quitting. Their support is important.  · Talk with your doctor about the choices that may help you quit.  · Find out if your health insurance will pay for these treatments.  · Know the people, places, things, and activities that make you want to smoke (triggers). Avoid them.  What first steps can I take to quit smoking?  · Throw away all cigarettes at home, at work, and in your car.  · Throw away the things that you use when you smoke, such as ashtrays and lighters.  · Clean your car. Make  sure to empty the ashtray.  · Clean your home, including curtains and carpets.  What can I do to help me quit smoking?  Talk with your doctor about taking medicines and seeing a counselor at the same time. You are more likely to succeed when you do both.  · If you are pregnant or breastfeeding, talk with your doctor about counseling or other ways to quit smoking. Do not take medicine to help you quit smoking unless your doctor tells you to do so.  To quit smoking:  Quit right away  · Quit smoking totally, instead of slowly cutting back on how much you smoke over a period of time.  · Go to counseling. You are more likely to quit if you go to counseling sessions regularly.  Take medicine  You may take medicines to help you quit. Some medicines need a prescription, and some you can buy over-the-counter. Some medicines may contain a drug called nicotine to replace the nicotine in cigarettes. Medicines may:  · Help you to stop having the desire to smoke (cravings).  · Help to stop the problems that come when you stop smoking (withdrawal symptoms).  Your doctor may ask you to use:  · Nicotine patches, gum, or lozenges.  · Nicotine inhalers or sprays.  · Non-nicotine medicine that is taken by mouth.  Find resources  Find resources and other ways to help you quit smoking and remain smoke-free after you quit. These resources are most helpful when you use them often. They include:  · Online chats with a counselor.  · Phone quitlines.  · Printed self-help materials.  · Support groups or group counseling.  · Text messaging programs.  · Mobile phone apps. Use apps on your mobile phone or tablet that can help you stick to your quit plan. There are many free apps for mobile phones and tablets as well as websites. Examples include Quit Guide from the CDC and smokefree.gov    What things can I do to make it easier to quit?    · Talk to your family and friends. Ask them to support and encourage you.  · Call a phone quitline  (1-800-QUIT-NOW), reach out to support groups, or work with a counselor.  · Ask people who smoke to not smoke around you.  · Avoid places that make you want to smoke, such as:  ? Bars.  ? Parties.  ? Smoke-break areas at work.  · Spend time with people who do not smoke.  · Lower the stress in your life. Stress can make you want to smoke. Try these things to help your stress:  ? Getting regular exercise.  ? Doing deep-breathing exercises.  ? Doing yoga.  ? Meditating.  ? Doing a body scan. To do this, close your eyes, focus on one area of your body at a time from head to toe. Notice which parts of your body are tense. Try to relax the muscles in those areas.  How will I feel when I quit smoking?  Day 1 to 3 weeks  Within the first 24 hours, you may start to have some problems that come from quitting tobacco. These problems are very bad 2-3 days after you quit, but they do not often last for more than 2-3 weeks. You may get these symptoms:  · Mood swings.  · Feeling restless, nervous, angry, or annoyed.  · Trouble concentrating.  · Dizziness.  · Strong desire for high-sugar foods and nicotine.  · Weight gain.  · Trouble pooping (constipation).  · Feeling like you may vomit (nausea).  · Coughing or a sore throat.  · Changes in how the medicines that you take for other issues work in your body.  · Depression.  · Trouble sleeping (insomnia).  Week 3 and afterward  After the first 2-3 weeks of quitting, you may start to notice more positive results, such as:  · Better sense of smell and taste.  · Less coughing and sore throat.  · Slower heart rate.  · Lower blood pressure.  · Clearer skin.  · Better breathing.  · Fewer sick days.  Quitting smoking can be hard. Do not give up if you fail the first time. Some people need to try a few times before they succeed. Do your best to stick to your quit plan, and talk with your doctor if you have any questions or concerns.  Summary  · Smoking tobacco is the leading cause of  preventable death. Quitting smoking can be hard, but it is one of the best things that you can do for your health.  · When you decide to quit smoking, make a plan to help you succeed.  · Quit smoking right away, not slowly over a period of time.  · When you start quitting, seek help from your doctor, family, or friends.  This information is not intended to replace advice given to you by your health care provider. Make sure you discuss any questions you have with your health care provider.  Document Revised: 09/11/2020 Document Reviewed: 03/07/2020  [employer] Patient Education © 2021 [employer]      BMI for Adults  What is BMI?  Body mass index (BMI) is a number that is calculated from a person's weight and height. BMI can help estimate how much of a person's weight is composed of fat. BMI does not measure body fat directly. Rather, it is an alternative to procedures that directly measure body fat, which can be difficult and expensive.  BMI can help identify people who may be at higher risk for certain medical problems.  What are BMI measurements used for?  BMI is used as a screening tool to identify possible weight problems. It helps determine whether a person is obese, overweight, a healthy weight, or underweight.  BMI is useful for:  · Identifying a weight problem that may be related to a medical condition or may increase the risk for medical problems.  · Promoting changes, such as changes in diet and exercise, to help reach a healthy weight. BMI screening can be repeated to see if these changes are working.  How is BMI calculated?  BMI involves measuring your weight in relation to your height. Both height and weight are measured, and the BMI is calculated from those numbers. This can be done either in English ([place] or metric measurements. Note that charts and online BMI calculators are available to help you find your BMI quickly and easily without having to do these calculations yourself.  To calculate your  "BMI in English ([place] measurements:    5. Measure your weight in pounds (lb).  6. Multiply the number of pounds by 703.  ? For example, for a person who weighs 180 lb, multiply that number by 703, which equals 126,540.  7. Measure your height in inches. Then multiply that number by itself to get a measurement called \"inches squared.\"  ? For example, for a person who is 70 inches tall, the \"inches squared\" measurement is 70 inches x 70 inches, which equals 4,900 inches squared.  8. Divide the total from step 2 (number of lb x 703) by the total from step 3 (inches squared): 126,540 ÷ 4,900 = 25.8. This is your BMI.    To calculate your BMI in metric measurements:  4. Measure your weight in kilograms (kg).  5. Measure your height in meters (m). Then multiply that number by itself to get a measurement called \"meters squared.\"  ? For example, for a person who is 1.75 m tall, the \"meters squared\" measurement is 1.75 m x 1.75 m, which is equal to 3.1 meters squared.  6. Divide the number of kilograms (your weight) by the meters squared number. In this example: 70 ÷ 3.1 = 22.6. This is your BMI.  What do the results mean?  BMI charts are used to identify whether you are underweight, normal weight, overweight, or obese. The following guidelines will be used:  · Underweight: BMI less than 18.5.  · Normal weight: BMI between 18.5 and 24.9.  · Overweight: BMI between 25 and 29.9.  · Obese: BMI of 30 or above.  Keep these notes in mind:  · Weight includes both fat and muscle, so someone with a muscular build, such as an athlete, may have a BMI that is higher than 24.9. In cases like these, BMI is not an accurate measure of body fat.  · To determine if excess body fat is the cause of a BMI of 25 or higher, further assessments may need to be done by a health care provider.  · BMI is usually interpreted in the same way for men and women.  Where to find more information  For more information about BMI, including tools to quickly " "calculate your BMI, go to these websites:  · Centers for Disease Control and Prevention: www.cdc.gov  · American Heart Association: www.heart.org  · National Heart, Lung, and Blood [place]: www.nhlbi.nih.gov  Summary  · Body mass index (BMI) is a number that is calculated from a person's weight and height.  · BMI may help estimate how much of a person's weight is composed of fat. BMI can help identify those who may be at higher risk for certain medical problems.  · BMI can be measured using English measurements or metric measurements.  · BMI charts are used to identify whether you are underweight, normal weight, overweight, or obese.  This information is not intended to replace advice given to you by your health care provider. Make sure you discuss any questions you have with your health care provider.  Document Revised: 09/09/2020 Document Reviewed: 07/17/2020  [employer] Patient Education © 2021 [employer]      https://www.nhlbi.nih.gov/files/docs/public/heart/dash_brief.pdf\">   DASH Eating Plan  DASH stands for Dietary Approaches to Stop Hypertension. The DASH eating plan is a healthy eating plan that has been shown to:  · Reduce high blood pressure (hypertension).  · Reduce your risk for type 2 diabetes, heart disease, and stroke.  · Help with weight loss.  What are tips for following this plan?  Reading food labels  · Check food labels for the amount of salt (sodium) per serving. Choose foods with less than 5 percent of the Daily Value of sodium. Generally, foods with less than 300 milligrams (mg) of sodium per serving fit into this eating plan.  · To find whole grains, look for the word \"whole\" as the first word in the ingredient list.  Shopping  · Buy products labeled as \"low-sodium\" or \"no salt added.\"  · Buy fresh foods. Avoid canned foods and pre-made or frozen meals.  Cooking  · Avoid adding salt when cooking. Use salt-free seasonings or herbs instead of table salt or sea salt. Check with your health " care provider or pharmacist before using salt substitutes.  · Do not ely foods. Cook foods using healthy methods such as baking, boiling, grilling, roasting, and broiling instead.  · Cook with heart-healthy oils, such as olive, canola, avocado, soybean, or sunflower oil.  Meal planning    · Eat a balanced diet that includes:  ? 4 or more servings of fruits and 4 or more servings of vegetables each day. Try to fill one-half of your plate with fruits and vegetables.  ? 6-8 servings of whole grains each day.  ? Less than 6 oz (170 g) of lean meat, poultry, or fish each day. A 3-oz (85-g) serving of meat is about the same size as a deck of cards. One egg equals 1 oz (28 g).  ? 2-3 servings of low-fat dairy each day. One serving is 1 cup (237 mL).  ? 1 serving of nuts, seeds, or beans 5 times each week.  ? 2-3 servings of heart-healthy fats. Healthy fats called omega-3 fatty acids are found in foods such as walnuts, flaxseeds, fortified milks, and eggs. These fats are also found in cold-water fish, such as sardines, salmon, and mackerel.  · Limit how much you eat of:  ? Canned or prepackaged foods.  ? Food that is high in trans fat, such as some fried foods.  ? Food that is high in saturated fat, such as fatty meat.  ? Desserts and other sweets, sugary drinks, and other foods with added sugar.  ? Full-fat dairy products.  · Do not salt foods before eating.  · Do not eat more than 4 egg yolks a week.  · Try to eat at least 2 vegetarian meals a week.  · Eat more home-cooked food and less restaurant, buffet, and fast food.    Lifestyle  · When eating at a restaurant, ask that your food be prepared with less salt or no salt, if possible.  · If you drink alcohol:  ? Limit how much you use to:  § 0-1 drink a day for women who are not pregnant.  § 0-2 drinks a day for men.  ? Be aware of how much alcohol is in your drink. In the [place] one drink equals one 12 oz bottle of beer (355 mL), one 5 oz glass of wine (148 mL), or one 1½  oz glass of hard liquor (44 mL).  General information  · Avoid eating more than 2,300 mg of salt a day. If you have hypertension, you may need to reduce your sodium intake to 1,500 mg a day.  · Work with your health care provider to maintain a healthy body weight or to lose weight. Ask what an ideal weight is for you.  · Get at least 30 minutes of exercise that causes your heart to beat faster (aerobic exercise) most days of the week. Activities may include walking, swimming, or biking.  · Work with your health care provider or dietitian to adjust your eating plan to your individual calorie needs.  What foods should I eat?  Fruits  All fresh, dried, or frozen fruit. Canned fruit in natural juice (without added sugar).  Vegetables  Fresh or frozen vegetables (raw, steamed, roasted, or grilled). Low-sodium or reduced-sodium tomato and vegetable juice. Low-sodium or reduced-sodium tomato sauce and tomato paste. Low-sodium or reduced-sodium canned vegetables.  Grains  Whole-grain or whole-wheat bread. Whole-grain or whole-wheat pasta. Brown rice. Oatmeal. Quinoa. Bulgur. Whole-grain and low-sodium cereals. Gifty bread. Low-fat, low-sodium crackers. Whole-wheat flour tortillas.  Meats and other proteins  Skinless chicken or turkey. Ground chicken or turkey. Pork with fat trimmed off. Fish and seafood. Egg whites. Dried beans, peas, or lentils. Unsalted nuts, nut butters, and seeds. Unsalted canned beans. Lean cuts of beef with fat trimmed off. Low-sodium, lean precooked or cured meat, such as sausages or meat loaves.  Dairy  Low-fat (1%) or fat-free (skim) milk. Reduced-fat, low-fat, or fat-free cheeses. Nonfat, low-sodium ricotta or cottage cheese. Low-fat or nonfat yogurt. Low-fat, low-sodium cheese.  Fats and oils  Soft margarine without trans fats. Vegetable oil. Reduced-fat, low-fat, or light mayonnaise and salad dressings (reduced-sodium). Canola, safflower, olive, avocado, soybean, and sunflower oils.  Avocado.  Seasonings and condiments  Herbs. Spices. Seasoning mixes without salt.  Other foods  Unsalted popcorn and pretzels. Fat-free sweets.  The items listed above may not be a complete list of foods and beverages you can eat. Contact a dietitian for more information.  What foods should I avoid?  Fruits  Canned fruit in a light or heavy syrup. Fried fruit. Fruit in cream or butter sauce.  Vegetables  Creamed or fried vegetables. Vegetables in a cheese sauce. Regular canned vegetables (not low-sodium or reduced-sodium). Regular canned tomato sauce and paste (not low-sodium or reduced-sodium). Regular tomato and vegetable juice (not low-sodium or reduced-sodium). Pickles. Olives.  Grains  Baked goods made with fat, such as croissants, muffins, or some breads. Dry pasta or rice meal packs.  Meats and other proteins  Fatty cuts of meat. Ribs. Fried meat. Guerra. Bologna, salami, and other precooked or cured meats, such as sausages or meat loaves. Fat from the back of a pig (fatback). Bratwurst. Salted nuts and seeds. Canned beans with added salt. Canned or smoked fish. Whole eggs or egg yolks. Chicken or turkey with skin.  Dairy  Whole or 2% milk, cream, and half-and-half. Whole or full-fat cream cheese. Whole-fat or sweetened yogurt. Full-fat cheese. Nondairy creamers. Whipped toppings. Processed cheese and cheese spreads.  Fats and oils  Butter. Stick margarine. Lard. Shortening. Ghee. Guerra fat. Tropical oils, such as coconut, palm kernel, or palm oil.  Seasonings and condiments  Onion salt, garlic salt, seasoned salt, table salt, and sea salt. Worcestershire sauce. Tartar sauce. Barbecue sauce. Teriyaki sauce. Soy sauce, including reduced-sodium. Steak sauce. Canned and packaged gravies. Fish sauce. Oyster sauce. Cocktail sauce. Store-bought horseradish. Ketchup. Mustard. Meat flavorings and tenderizers. Bouillon cubes. Hot sauces. Pre-made or packaged marinades. Pre-made or packaged taco seasonings. Relishes.  Regular salad dressings.  Other foods  Salted popcorn and pretzels.  The items listed above may not be a complete list of foods and beverages you should avoid. Contact a dietitian for more information.  Where to find more information  · National Heart, Lung, and Blood [place]: www.nhlbi.nih.gov  · American Heart Association: www.heart.org  · Academy of Nutrition and Dietetics: www.eatright.org  · National Kidney Foundation: www.kidney.org  Summary  · The DASH eating plan is a healthy eating plan that has been shown to reduce high blood pressure (hypertension). It may also reduce your risk for type 2 diabetes, heart disease, and stroke.  · When on the DASH eating plan, aim to eat more fresh fruits and vegetables, whole grains, lean proteins, low-fat dairy, and heart-healthy fats.  · With the DASH eating plan, you should limit salt (sodium) intake to 2,300 mg a day. If you have hypertension, you may need to reduce your sodium intake to 1,500 mg a day.  · Work with your health care provider or dietitian to adjust your eating plan to your individual calorie needs.  This information is not intended to replace advice given to you by your health care provider. Make sure you discuss any questions you have with your health care provider.  Document Revised: 11/20/2020 Document Reviewed: 11/20/2020  [employer] Patient Education © 2021 [employer]      Steps to Quit Smoking  Smoking tobacco is the leading cause of preventable death. It can affect almost every organ in the body. Smoking puts you and people around you at risk for many serious, long-lasting (chronic) diseases. Quitting smoking can be hard, but it is one of the best things that you can do for your health. It is never too late to quit.  How do I get ready to quit?  When you decide to quit smoking, make a plan to help you succeed. Before you quit:  · Pick a date to quit. Set a date within the next 2 weeks to give you time to prepare.  · Write down the reasons why  you are quitting. Keep this list in places where you will see it often.  · Tell your family, friends, and co-workers that you are quitting. Their support is important.  · Talk with your doctor about the choices that may help you quit.  · Find out if your health insurance will pay for these treatments.  · Know the people, places, things, and activities that make you want to smoke (triggers). Avoid them.  What first steps can I take to quit smoking?  · Throw away all cigarettes at home, at work, and in your car.  · Throw away the things that you use when you smoke, such as ashtrays and lighters.  · Clean your car. Make sure to empty the ashtray.  · Clean your home, including curtains and carpets.  What can I do to help me quit smoking?  Talk with your doctor about taking medicines and seeing a counselor at the same time. You are more likely to succeed when you do both.  · If you are pregnant or breastfeeding, talk with your doctor about counseling or other ways to quit smoking. Do not take medicine to help you quit smoking unless your doctor tells you to do so.  To quit smoking:  Quit right away  · Quit smoking totally, instead of slowly cutting back on how much you smoke over a period of time.  · Go to counseling. You are more likely to quit if you go to counseling sessions regularly.  Take medicine  You may take medicines to help you quit. Some medicines need a prescription, and some you can buy over-the-counter. Some medicines may contain a drug called nicotine to replace the nicotine in cigarettes. Medicines may:  · Help you to stop having the desire to smoke (cravings).  · Help to stop the problems that come when you stop smoking (withdrawal symptoms).  Your doctor may ask you to use:  · Nicotine patches, gum, or lozenges.  · Nicotine inhalers or sprays.  · Non-nicotine medicine that is taken by mouth.  Find resources  Find resources and other ways to help you quit smoking and remain smoke-free after you quit.  These resources are most helpful when you use them often. They include:  · Online chats with a counselor.  · Phone quitlines.  · Printed self-help materials.  · Support groups or group counseling.  · Text messaging programs.  · Mobile phone apps. Use apps on your mobile phone or tablet that can help you stick to your quit plan. There are many free apps for mobile phones and tablets as well as websites. Examples include Quit Guide from the CDC and smokefree.gov    What things can I do to make it easier to quit?    · Talk to your family and friends. Ask them to support and encourage you.  · Call a phone quitline (0-499-QUIT-NOW), reach out to support groups, or work with a counselor.  · Ask people who smoke to not smoke around you.  · Avoid places that make you want to smoke, such as:  ? Bars.  ? Parties.  ? Smoke-break areas at work.  · Spend time with people who do not smoke.  · Lower the stress in your life. Stress can make you want to smoke. Try these things to help your stress:  ? Getting regular exercise.  ? Doing deep-breathing exercises.  ? Doing yoga.  ? Meditating.  ? Doing a body scan. To do this, close your eyes, focus on one area of your body at a time from head to toe. Notice which parts of your body are tense. Try to relax the muscles in those areas.  How will I feel when I quit smoking?  Day 1 to 3 weeks  Within the first 24 hours, you may start to have some problems that come from quitting tobacco. These problems are very bad 2-3 days after you quit, but they do not often last for more than 2-3 weeks. You may get these symptoms:  · Mood swings.  · Feeling restless, nervous, angry, or annoyed.  · Trouble concentrating.  · Dizziness.  · Strong desire for high-sugar foods and nicotine.  · Weight gain.  · Trouble pooping (constipation).  · Feeling like you may vomit (nausea).  · Coughing or a sore throat.  · Changes in how the medicines that you take for other issues work in your  body.  · Depression.  · Trouble sleeping (insomnia).  Week 3 and afterward  After the first 2-3 weeks of quitting, you may start to notice more positive results, such as:  · Better sense of smell and taste.  · Less coughing and sore throat.  · Slower heart rate.  · Lower blood pressure.  · Clearer skin.  · Better breathing.  · Fewer sick days.  Quitting smoking can be hard. Do not give up if you fail the first time. Some people need to try a few times before they succeed. Do your best to stick to your quit plan, and talk with your doctor if you have any questions or concerns.  Summary  · Smoking tobacco is the leading cause of preventable death. Quitting smoking can be hard, but it is one of the best things that you can do for your health.  · When you decide to quit smoking, make a plan to help you succeed.  · Quit smoking right away, not slowly over a period of time.  · When you start quitting, seek help from your doctor, family, or friends.  This information is not intended to replace advice given to you by your health care provider. Make sure you discuss any questions you have with your health care provider.  Document Revised: 09/11/2020 Document Reviewed: 03/07/2020  [employer] Patient Education © 2021 [employer]

## 2021-11-04 NOTE — PROGRESS NOTES
Patient    Chief complaint:   Chief Complaint   Patient presents with   • Back Pain     Moriah is returning with complaint of pain from tailbone down her right leg, this pain sent her to the ED in [place] on 11/02/2021 where she had a CT scan of the lumbar and was told to follow with her neurosurgeon.        Subjective     HPI: This is a 21-year-old female patient who we saw back in April in the hospital for an acute onset of back pain and bilateral lower extremity pain.  Patient was found to have a disc herniation at L4-5 that was causing central canal stenosis and bilateral foraminal narrowing.  The patient was sent to physical therapy after she was discharged from the hospital.  She was last seen in May and at the time that we saw her she had only done one session of physical therapy and had not gone back due to having increased pain.  At the time she was seen in the office she was complaining of back pain and right lower extremity pain it was not complaining of any left lower extremity pain.  We did send the patient back through physical therapy.  She canceled her follow-up appoint with Dr. Orellana.  She did call back this month after going to the emergency room on November 2, 2021 with a complaint of back pain and right lower extremity pain.  She said that she did get 100% improvement in her back and her leg pain and was doing good up until about a week ago.  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  She says the pain is at the bottom of her tailbone going over into her right lower extremity in a posterior radicular fashion to her foot.  This pain is constant.  It is worse with movement.  It is better with heat.  Denies any bowel or bladder incontinence.  She did attempt chiropractic care without any improvement.  She has not done any recent physical therapy or pain management injections.  She is right-hand dominant.  She does smoke half a pack of cigarettes a day.  Denies  "any alcohol or illicit drug use.  Rates her pain on a scale 0-10 at an 8.  She says it does interfere with her actives of daily living    Review of Systems   Musculoskeletal: Positive for back pain.   Neurological: Negative.    Psychiatric/Behavioral: Negative.    All other systems reviewed and are negative.       Past Medical History:   Diagnosis Date   • Allergic    • Anxiety    • Obesity    • Postural hypotension    • Urinary tract infection      No past surgical history on file.  Family History   Problem Relation Age of Onset   • Heart attack Father    • Heart disease Father    • Diabetes Mother    • Stroke Mother    • Schizophrenia Brother    • No Known Problems Son    • Hepatitis Maternal Grandmother    • Schizophrenia Maternal Grandmother    • Diabetes Paternal Grandmother    • Heart disease Paternal Grandfather    • Heart attack Paternal Grandfather    • Diabetes Paternal Grandfather    • No Known Problems Brother      Social History     Tobacco Use   • Smoking status: Current Every Day Smoker     Packs/day: 0.50     Types: Cigarettes   • Smokeless tobacco: Never Used   Vaping Use   • Vaping Use: Former   • Substances: Flavoring   • Devices: Disposable, Pre-filled pod   Substance Use Topics   • Alcohol use: Never   • Drug use: Never     Comment: former     (Not in a hospital admission)    Allergies:  Patient has no known allergies.    Objective      Vital Signs  /60   Ht 161.3 cm (63.5\")   Wt 83.1 kg (183 lb 3.2 oz)   BMI 31.94 kg/m²     Physical Exam  Constitutional:       Appearance: Normal appearance. She is well-developed.   HENT:      Head: Normocephalic.   Eyes:      General: Lids are normal.      Extraocular Movements: EOM normal.      Conjunctiva/sclera: Conjunctivae normal.      Pupils: Pupils are equal, round, and reactive to light.   Cardiovascular:      Rate and Rhythm: Normal rate and regular rhythm.   Pulmonary:      Effort: Pulmonary effort is normal.      Breath sounds: Normal breath " sounds.   Musculoskeletal:         General: Normal range of motion.      Cervical back: Normal range of motion.   Skin:     General: Skin is warm.   Neurological:      Mental Status: She is alert and oriented to person, place, and time.      GCS: GCS eye subscore is 4. GCS verbal subscore is 5. GCS motor subscore is 6.      Cranial Nerves: No cranial nerve deficit.      Sensory: No sensory deficit.      Gait: Gait abnormal.      Deep Tendon Reflexes: Strength normal and reflexes are normal and symmetric. Reflexes normal.   Psychiatric:         Speech: Speech normal.         Behavior: Behavior normal.         Thought Content: Thought content normal.         Neurologic Exam     Mental Status   Oriented to person, place, and time.   Attention: normal. Concentration: normal.   Speech: speech is normal   Level of consciousness: alert  Normal comprehension.     Cranial Nerves     CN II   Visual fields full to confrontation.     CN III, IV, VI   Pupils are equal, round, and reactive to light.  Extraocular motions are normal.     CN V   Facial sensation intact.     CN VII   Facial expression full, symmetric.     CN VIII   CN VIII normal.     CN IX, X   CN IX normal.   CN X normal.     CN XI   CN XI normal.     CN XII   CN XII normal.     Motor Exam   Muscle bulk: normal    Strength   Strength 5/5 throughout.     Sensory Exam   Light touch normal.     Gait, Coordination, and Reflexes     Reflexes   Reflexes 2+ except as noted. Limp favoring right lower extremity       Imaging review: CT scan of the lumbar spine that was done on November 2, 2021 at [facility] was concerning for having a larger disc herniation at L4-5 however it is difficult to assess.        Assessment/Plan: I did offer to send the patient for another dedicated course of physical therapy but the patient stated that it only made her worse last time he did not want to pursue any more physical therapy.  We will order an MRI of the lumbar spine  to see if there is any changes in the disc herniation or if there is a new disc herniation.  The patient is agreeable to this.  Her questions and concerns were addressed.  We will have her follow-up with Dr. Orellana after imaging is completed.  Fabby Carey MA, accompanied me during this visit  Patient is a nonsmoker  The patient's Body mass index is 31.94 kg/m².. BMI is above normal parameters. Recommendations include: educational material and nutrition counseling    Diagnoses and all orders for this visit:    1. Lumbar disc herniation (Primary)  -     MRI Lumbar Spine Without Contrast; Future    2. Smoker    3. Class 1 obesity due to excess calories with body mass index (BMI) of 31.0 to 31.9 in adult, unspecified whether serious comorbidity present          I discussed the patients findings and my recommendations with patient    Demian Velez, APRN  11/04/21  14:36 CDT

## 2021-11-05 ENCOUNTER — TELEPHONE (OUTPATIENT)
Dept: FAMILY MEDICINE CLINIC | Facility: CLINIC | Age: 21
End: 2021-11-05

## 2021-11-05 ENCOUNTER — OFFICE VISIT (OUTPATIENT)
Dept: FAMILY MEDICINE CLINIC | Facility: CLINIC | Age: 21
End: 2021-11-05

## 2021-11-05 ENCOUNTER — LAB (OUTPATIENT)
Dept: LAB | Facility: HOSPITAL | Age: 21
End: 2021-11-05

## 2021-11-05 VITALS
TEMPERATURE: 97.5 F | OXYGEN SATURATION: 100 % | SYSTOLIC BLOOD PRESSURE: 120 MMHG | DIASTOLIC BLOOD PRESSURE: 78 MMHG | HEIGHT: 64 IN | BODY MASS INDEX: 31.7 KG/M2 | HEART RATE: 93 BPM | RESPIRATION RATE: 22 BRPM | WEIGHT: 185.7 LBS

## 2021-11-05 DIAGNOSIS — M51.26 LUMBAR DISC HERNIATION: Primary | ICD-10-CM

## 2021-11-05 LAB
AMPHET+METHAMPHET UR QL: NEGATIVE
AMPHETAMINES UR QL: NEGATIVE
BARBITURATES UR QL SCN: NEGATIVE
BENZODIAZ UR QL SCN: NEGATIVE
BUPRENORPHINE SERPL-MCNC: NEGATIVE NG/ML
CANNABINOIDS SERPL QL: POSITIVE
COCAINE UR QL: NEGATIVE
METHADONE UR QL SCN: NEGATIVE
OPIATES UR QL: POSITIVE
OXYCODONE UR QL SCN: NEGATIVE
PCP UR QL SCN: NEGATIVE
PROPOXYPH UR QL: NEGATIVE
TRICYCLICS UR QL SCN: POSITIVE

## 2021-11-05 PROCEDURE — 80306 DRUG TEST PRSMV INSTRMNT: CPT | Performed by: NURSE PRACTITIONER

## 2021-11-05 PROCEDURE — 99214 OFFICE O/P EST MOD 30 MIN: CPT | Performed by: NURSE PRACTITIONER

## 2021-11-05 RX ORDER — HYDROCODONE BITARTRATE AND ACETAMINOPHEN 7.5; 325 MG/1; MG/1
1 TABLET ORAL EVERY 6 HOURS PRN
Qty: 30 TABLET | Refills: 0 | Status: SHIPPED | OUTPATIENT
Start: 2021-11-05 | End: 2021-11-15 | Stop reason: SDUPTHER

## 2021-11-05 NOTE — TELEPHONE ENCOUNTER
Called and discussed the situation with patient's  Lokesh.  Patient's  did express that the patient did not feel like her concerns were being taken seriously.  Patient stated that she had been through physical therapy without any improvement in the past and did not wish to pursue physical therapy treatments.  I explained to the patient's  that I did offer to send the patient to physical therapy but due to the patient's reluctance I offered to set her up to do an MRI of the lumbar spine.  I did explain to him that we will have to wait to see if the insurance will approve the MRI before it can be performed.  Patient's  also stated that nothing was being done to help with the patient's pain other than ibuprofen.  I did explain to him that I did offer to give the patient a stronger NSAID but the patient opted to continue using the ibuprofen.  The patient's  also stated that it was poor care that the MRI would not be done until March.  I explained to the patient that she does have an appointment set up to see Dr. Orellana in March and that we are working to try and get the MRI approved and the MRI would hopefully be done in a few weeks if approved by the insurance company and once we do review the MRI if it is felt that the patient needs to be seen sooner than we would work to move her appointment up to see Dr. Orellana.  The patient's  went on to explain that his wife does not do well under pressure and whenever she is hurting she is likely to get things confused and because he was not able to come up to the appointment that he was not getting the full story.  I explained to him that there are certain restrictions we have in regards to visitors per hospital policy.  I also did explain to him that the treatment plan was not being conveyed accurately to him and attempted to explain to him the process that we have in place for trying to treat his wife.  Patient's  acknowledged  understanding.  Grace Potter MA was present during this phone conversation as well as Fabby Carey MA.

## 2021-11-05 NOTE — PROGRESS NOTES
Urine drug screen appropriate except for it was positive for THC.  To receive any more refills she needs to abstain from marijuana consumption.

## 2021-11-05 NOTE — TELEPHONE ENCOUNTER
Criselda from [facility] called to let us know that they do not accept Ms. Nicole insurance. They are not in [facility]. They will not be able to see MsJeromy Corey

## 2021-11-05 NOTE — PROGRESS NOTES
Subjective   Moriah Nicole is a 21 y.o. female.     CC: Low back pain    Back Pain  This is a new problem. The current episode started more than 1 month ago. The problem occurs constantly. The problem has been gradually worsening since onset. The pain is present in the lumbar spine. The quality of the pain is described as aching and shooting. The pain radiates to the right foot. The pain is at a severity of 10/10. The pain is severe. The pain is the same all the time. The symptoms are aggravated by twisting, standing, position, bending, sitting, coughing, stress and lying down. Stiffness is present all day. Associated symptoms include bladder incontinence, leg pain, numbness (toes on right foot), tingling and weakness (right leg). Pertinent negatives include no abdominal pain, chest pain, dysuria, fever or weight loss. She has tried NSAIDs, muscle relaxant, walking, ice and home exercises (PT) for the symptoms. The treatment provided no relief.        The following portions of the patient's history were reviewed and updated as appropriate: allergies, current medications, past family history, past medical history, past social history, past surgical history and problem list.    Review of Systems   Constitutional: Negative for activity change, appetite change, chills, diaphoresis, fatigue, fever, unexpected weight gain and unexpected weight loss.   HENT: Negative for congestion, sore throat, trouble swallowing and voice change.    Eyes: Negative for blurred vision, double vision, photophobia, pain and visual disturbance.   Respiratory: Negative for cough, chest tightness, shortness of breath and wheezing.    Cardiovascular: Negative for chest pain, palpitations and leg swelling.   Gastrointestinal: Negative for abdominal distention, abdominal pain, anal bleeding, blood in stool, constipation, diarrhea, nausea, vomiting, GERD and indigestion.   Endocrine: Negative for cold intolerance, heat intolerance,  polydipsia, polyphagia and polyuria.   Genitourinary: Positive for urinary incontinence. Negative for dysuria, frequency, hematuria and urgency.   Musculoskeletal: Positive for back pain (worsening, no improvement with PT, rest, NSAIDS, heat, norco 5-325. has intermittent urinary incontinence.  Reports constant numbness to the toes on her right foot). Negative for arthralgias and myalgias.   Skin: Negative for rash.   Allergic/Immunologic: Negative.    Neurological: Positive for tingling, weakness (right leg) and numbness (toes on right foot). Negative for dizziness, syncope, light-headedness and headache.   Hematological: Negative.    Psychiatric/Behavioral: The patient is not nervous/anxious.        Objective   Physical Exam  Vitals and nursing note reviewed.   Constitutional:       General: She is in acute distress (in severe pain, patient pushing up on arm rest of chair to take pressure off of low back).      Appearance: Normal appearance. She is well-developed. She is obese. She is not ill-appearing, toxic-appearing or diaphoretic.   HENT:      Head: Normocephalic and atraumatic.   Eyes:      Conjunctiva/sclera: Conjunctivae normal.   Cardiovascular:      Rate and Rhythm: Normal rate and regular rhythm.      Heart sounds: Normal heart sounds.   Pulmonary:      Effort: Pulmonary effort is normal. No respiratory distress.      Breath sounds: Normal breath sounds.   Musculoskeletal:         General: No tenderness. Normal range of motion.      Cervical back: Normal range of motion.   Skin:     General: Skin is warm and dry.      Coloration: Skin is not pale.      Findings: No erythema or rash.   Neurological:      Mental Status: She is alert and oriented to person, place, and time.      Cranial Nerves: Cranial nerves are intact.      Motor: Weakness (right leg compared to left leg) present.      Gait: Gait abnormal.   Psychiatric:         Attention and Perception: Attention and perception normal.         Mood and  Affect: Mood normal. Affect is tearful (due to severe pain).         Speech: Speech normal.         Behavior: Behavior normal.         Thought Content: Thought content normal.         Judgment: Judgment normal.           Assessment/Plan   Diagnoses and all orders for this visit:    1. Lumbar disc herniation (Primary)  -     Ambulatory Referral to Neurosurgery  -     Urine Drug Screen - Urine, Clean Catch  -     HYDROcodone-acetaminophen (Norco) 7.5-325 MG per tablet; Take 1 tablet by mouth Every 6 (Six) Hours As Needed for Moderate Pain .  Dispense: 30 tablet; Refill: 0   -Patient in significant pain/distress today.  Radiology called in order to expedite MRI.  Urgent/stat referral placed to neurosurgery for first available appointment.  Patient has numbness in the toes of her right foot and reports intermittent urinary incontinence.  Instructed patient that should symptoms worsen she should immediately present to the ER for further evaluation.  Pain currently not well controlled.  Will increase Norco to 7.5-325 mg 1 tablet every 6 hours as needed for pain.  [facility] #[id number] reviewed and appropriate.  Urine drug screen ordered.  Controlled substance contract signed.  Patient verbalized understanding of instruction and agrees with plan of care.    #2.  Follow-up as scheduled or sooner.  Present to the ER for any worsening of symptoms.            This document has been electronically signed by DAVID Ye on November 5, 2021 10:49 CDT

## 2021-11-11 ENCOUNTER — HOSPITAL ENCOUNTER (EMERGENCY)
Facility: HOSPITAL | Age: 21
Discharge: HOME | End: 2021-11-11
Attending: EMERGENCY MEDICINE | Admitting: EMERGENCY MEDICINE

## 2021-11-11 ENCOUNTER — HOSPITAL ENCOUNTER (OUTPATIENT)
Dept: MRI IMAGING | Facility: HOSPITAL | Age: 21
Discharge: HOME | End: 2021-11-11
Admitting: NURSE PRACTITIONER

## 2021-11-11 VITALS
HEIGHT: 64 IN | HEART RATE: 80 BPM | BODY MASS INDEX: 31.58 KG/M2 | DIASTOLIC BLOOD PRESSURE: 80 MMHG | RESPIRATION RATE: 16 BRPM | OXYGEN SATURATION: 97 % | TEMPERATURE: 98 F | WEIGHT: 185 LBS | SYSTOLIC BLOOD PRESSURE: 126 MMHG

## 2021-11-11 DIAGNOSIS — M51.26 LUMBAR DISC HERNIATION: ICD-10-CM

## 2021-11-11 DIAGNOSIS — F41.8 SITUATIONAL ANXIETY: Primary | ICD-10-CM

## 2021-11-11 DIAGNOSIS — M51.36 DISCOGENIC LOW BACK PAIN: Primary | ICD-10-CM

## 2021-11-11 PROCEDURE — 99283 EMERGENCY DEPT VISIT LOW MDM: CPT

## 2021-11-11 PROCEDURE — 72148 MRI LUMBAR SPINE W/O DYE: CPT

## 2021-11-11 PROCEDURE — 96372 THER/PROPH/DIAG INJ SC/IM: CPT

## 2021-11-11 PROCEDURE — 25010000002 KETOROLAC TROMETHAMINE PER 15 MG: Performed by: STUDENT IN AN ORGANIZED HEALTH CARE EDUCATION/TRAINING PROGRAM

## 2021-11-11 RX ORDER — KETOROLAC TROMETHAMINE 30 MG/ML
30 INJECTION, SOLUTION INTRAMUSCULAR; INTRAVENOUS ONCE
Status: COMPLETED | OUTPATIENT
Start: 2021-11-11 | End: 2021-11-11

## 2021-11-11 RX ORDER — ASPIRIN 81 MG
1 TABLET,CHEWABLE ORAL ONCE
Status: DISCONTINUED | OUTPATIENT
Start: 2021-11-11 | End: 2021-11-11 | Stop reason: HOSPADM

## 2021-11-11 RX ORDER — ALPRAZOLAM 0.25 MG/1
0.25 TABLET ORAL ONCE
Qty: 1 TABLET | Refills: 0 | Status: SHIPPED | OUTPATIENT
Start: 2021-11-11 | End: 2021-11-11

## 2021-11-11 RX ADMIN — KETOROLAC TROMETHAMINE 30 MG: 30 INJECTION, SOLUTION INTRAMUSCULAR at 15:20

## 2021-11-11 NOTE — ED NOTES
Patient presents to ED with c/o back pain and numbness that radiates down to her toes. Patient has had some bladder incontinence but also had a baby 11 months ago.   Patient had an MRI earlier today.      Nazanin Sidhu RN  11/11/21 0442

## 2021-11-11 NOTE — ED NOTES
Patient refused application of medication, patient took the capsaicin home with her and was taught how to apply.      Nazanin Sidhu, RN  11/11/21 6734

## 2021-11-11 NOTE — PROGRESS NOTES
Bruno from November 5 reviewed and appropriate for one-time Xanax prescription prescribed on 11/11/2021.  Patient instructed to take 30 minutes prior to MRI.  Patient to have someone drive her to and from MRI appointment.  Patient not to take with narcotic pain medication, alcohol or other sedating medications.

## 2021-11-11 NOTE — ED PROVIDER NOTES
Subjective   ?  Family Medicine Residency  Hay Mcintyre MD    Subjective:    Back Pain HPI  Moriah Nicole, 21 y.o., female complains of low back pain. Onset of symptoms was abrupt starting 2 hours ago with gradually improving course since that time. It was related to a twisting movement and lying for MRI. The pain is rated 6 /10, severe, intense, and is located at the across the lower back or L3-5 region discogenic in nature. The pain is described as sharp and stabbing and occurs intermittently, in the morning and in the evening. The symptoms has been progressive. Symptoms are exacerbated by coughing, deep breathing, flexion, lifting, lying down, sneezing and twisting. Factors which relieve the pain include acetaminophen, change in body position, chiropractic manipulation, exercise, heat, narcotic pain medications and stretching with relief. Other associated symptoms include: tingling in the right leg. Previous history of symptoms: the problem is long-standing as the birth of her child approximately 11 months ago.  The patient's undergone MRI scan today and we are awaiting the results.    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   HENT: Negative for  congestion, dental problem, ear discharge, ear pain, hearing loss, mouth sores, nosebleeds, postnasal drip, sinus pain, sore throat, tinnitus, trouble swallowing and voice change.    Eyes: Negative for photophobia, pain, discharge and itching.   Respiratory: Negative for cough, choking, chest tightness, shortness of breath and wheezing.    Cardiovascular: Negative for chest pain, palpitations and leg swelling.   Gastrointestinal: Negative for abdominal distention, abdominal pain, blood in stool, constipation, diarrhea, nausea and vomiting.   Endocrine: Negative for cold intolerance and heat intolerance.   Genitourinary: Positive for frequency. Negative for difficulty urinating, dysuria, flank pain and hematuria.        Stress incontinence post birth of child 11 months ago   Musculoskeletal: Positive for back pain. Negative for joint swelling and neck pain.        Sharp discogenic low back pain   Skin: Negative for color change and rash.   Neurological: Negative for dizziness, tremors, seizures, weakness, light-headedness, numbness and headaches.   Hematological: Negative for adenopathy.   Psychiatric/Behavioral: Negative for behavioral problems, confusion, hallucinations, self-injury and sleep disturbance.       Past Medical History:   Diagnosis Date   • Allergic    • Anxiety    • Obesity    • Postural hypotension    • Urinary tract infection        No Known Allergies    History reviewed. No pertinent surgical history.    Family History   Problem Relation Age of Onset   • Heart attack Father    • Heart disease Father    • Diabetes Mother    • Stroke Mother    • Schizophrenia Brother    • No Known Problems Son    • Hepatitis Maternal Grandmother    • Schizophrenia Maternal Grandmother    • Diabetes Paternal Grandmother    • Heart disease Paternal Grandfather    • Heart attack Paternal Grandfather    • Diabetes Paternal Grandfather    • No Known Problems Brother        Social History     Socioeconomic History   •  Marital status:    • Highest education level: High school graduate   Tobacco Use   • Smoking status: Current Every Day Smoker     Packs/day: 0.50     Types: Cigarettes   • Smokeless tobacco: Never Used   Vaping Use   • Vaping Use: Former   • Substances: Flavoring   • Devices: Disposable, Pre-filled pod   Substance and Sexual Activity   • Alcohol use: Never   • Drug use: Never     Comment: former   • Sexual activity: Yes     Partners: Male     Birth control/protection: Pill           Objective   Physical Exam  Vitals and nursing note reviewed. Exam conducted with a chaperone present.   Constitutional:       Appearance: Normal appearance. She is not ill-appearing or diaphoretic.   HENT:      Head: Normocephalic and atraumatic.      Right Ear: External ear normal.      Left Ear: External ear normal.      Nose: Nose normal. No rhinorrhea.      Mouth/Throat:      Mouth: Mucous membranes are moist.      Pharynx: No posterior oropharyngeal erythema.   Eyes:      General: No scleral icterus.        Right eye: No discharge.         Left eye: No discharge.      Extraocular Movements: Extraocular movements intact.   Neck:      Vascular: No carotid bruit.   Cardiovascular:      Rate and Rhythm: Normal rate and regular rhythm.      Pulses: Normal pulses.      Heart sounds: Normal heart sounds. No gallop.    Pulmonary:      Effort: Pulmonary effort is normal.      Breath sounds: Normal breath sounds. No wheezing.   Chest:      Chest wall: No tenderness.   Abdominal:      General: Bowel sounds are normal.      Palpations: Abdomen is soft.      Tenderness: There is no rebound.   Musculoskeletal:         General: Tenderness present. No swelling.        Arms:       Cervical back: No muscular tenderness.      Right lower leg: No edema.      Left lower leg: No edema.        Legs:    Lymphadenopathy:      Cervical: No cervical adenopathy.   Skin:     General: Skin is warm and dry.      Capillary Refill: Capillary refill takes  less than 2 seconds.      Findings: No erythema or rash.   Neurological:      General: No focal deficit present.      Mental Status: She is alert and oriented to person, place, and time.      Cranial Nerves: No cranial nerve deficit.      Sensory: No sensory deficit.      Motor: No weakness.      Coordination: Coordination normal.      Gait: Gait normal.      Deep Tendon Reflexes: Reflexes normal.   Psychiatric:         Mood and Affect: Mood normal.         Behavior: Behavior normal.         Thought Content: Thought content normal.         Judgment: Judgment normal.         Procedures           ED Course  ED Course as of 11/11/21 1615   Thu Nov 11, 2021   1525 Acute low back pain  MRI await results [EO]      ED Course User Index  [EO] Hay Mcintyre MD                                         MRI:  Large central disc extrusion at the L5-S1 level as above with  increasing central canal stenosis noted which is now severe with  an AP canal dimension of only 2.6 mm. (Note is made of numbering  on the current exam corresponds to the numbering on the CT but is  different than on the exam in May 2021 secondary to the  transitional vertebra/lumbarization of the S1 vertebra as  detailed in body of the report.)      MDM  Number of Diagnoses or Management Options  Discogenic low back pain  Diagnosis management comments: Discogenic low back pain  Await MRI result       Amount and/or Complexity of Data Reviewed  Clinical lab tests: reviewed  Tests in the radiology section of CPT®: ordered  Tests in the medicine section of CPT®: reviewed  Discussion of test results with the performing providers: yes  Decide to obtain previous medical records or to obtain history from someone other than the patient: yes  Obtain history from someone other than the patient: yes  Review and summarize past medical records: yes  Discuss the patient with other providers: yes  Independent visualization of images, tracings, or specimens: yes    Risk of  Complications, Morbidity, and/or Mortality  Presenting problems: low  Diagnostic procedures: low  Management options: low        Final diagnoses:   Discogenic low back pain       ED Disposition  ED Disposition     ED Disposition Condition Comment    Discharge Stable 1. Large central disc extrusion at the L5-S1 level as above with  increasing central canal stenosis noted which is now severe with  an AP canal dimension of only 2.6 mm. (Note is made of numbering  on the current exam corresponds to the numbering on  the CT but is  different than on the exam in May 2021 secondary to the  transitional vertebra/lumbarization of the S1 vertebra as  detailed in body of the report.)    Patient to follow-up with spinal surgeon or neurology          Ramon Vargas, APRN  [address] [place] [facility] [address] [phone]    In 1 week  Back pain referral         Medication List      No changes were made to your prescriptions during this visit.          Hay Mcintyre MD  Resident  11/11/21 8165

## 2021-11-12 ENCOUNTER — TELEPHONE (OUTPATIENT)
Dept: NEUROSURGERY | Facility: CLINIC | Age: 21
End: 2021-11-12

## 2021-11-12 ENCOUNTER — DOCUMENTATION (OUTPATIENT)
Dept: NEUROSURGERY | Facility: CLINIC | Age: 21
End: 2021-11-12

## 2021-11-12 NOTE — PROGRESS NOTES
MRI of lumbar spine reviewed and shows large disc herniation at L5-S1 that is causing central canal stenosis and bilateral foraminal narrowing.  This imaging was reviewed with Dr. Orellana.  Dr. Orellana wants to see the patient to discuss treatment options.  Konrad Potter MA, has reached out to the patient and her  to see about moving the patient's appointment however there was no answer.  Please see telephone encounter note by Konrad Potter MA

## 2021-11-12 NOTE — TELEPHONE ENCOUNTER
I called and notified the patient's , Lokesh, that Dr Orellana would see her on Monday morning @ 8:30 am.    anneliese reeves CMA

## 2021-11-12 NOTE — PROGRESS NOTES
I contacted the patient to schedule her an appt with us today; however, there was no answer and no voicemail.  I called her , Lokesh, but no answer; however, I was able to leave him a voicemail asking him to have the patient call me back to see if she can come in today @ 2:45 pm.  Konrad Potter CMA

## 2021-11-12 NOTE — TELEPHONE ENCOUNTER
Called and spoke to patient's  Lokesh, and gave him the results of the MRI.  Discussed treatment options which would likely involve surgery.  The patient's  is agreeable to come back for further evaluation by Dr. Orellana.  I told him that I will have Dr. Orellana's assistant to reach back out to him with an appointment for some time next week

## 2021-11-12 NOTE — TELEPHONE ENCOUNTER
Called patient to see if she could come in today to see Dr Orellana and discuss her MRI results; however, she didn't answer and there was not a voicemail set up so I could not leave her a message.  I called her , Lokesh, but he didn't answer either; however, he did have a voicemail so I left a message asking him to have the patient call me back @ my direct # to let me know if she could come in today @ 1:45 pm.      anneliese reeves CMA

## 2021-11-15 ENCOUNTER — OFFICE VISIT (OUTPATIENT)
Dept: NEUROSURGERY | Facility: CLINIC | Age: 21
End: 2021-11-15

## 2021-11-15 VITALS — HEIGHT: 64 IN | WEIGHT: 182.8 LBS | BODY MASS INDEX: 31.21 KG/M2

## 2021-11-15 DIAGNOSIS — F17.200 SMOKER: ICD-10-CM

## 2021-11-15 DIAGNOSIS — M54.16 LUMBAR RADICULOPATHY: ICD-10-CM

## 2021-11-15 DIAGNOSIS — M51.26 LUMBAR DISC HERNIATION: Primary | ICD-10-CM

## 2021-11-15 DIAGNOSIS — E66.09 CLASS 1 OBESITY DUE TO EXCESS CALORIES WITH BODY MASS INDEX (BMI) OF 31.0 TO 31.9 IN ADULT, UNSPECIFIED WHETHER SERIOUS COMORBIDITY PRESENT: ICD-10-CM

## 2021-11-15 PROCEDURE — 99214 OFFICE O/P EST MOD 30 MIN: CPT | Performed by: NEUROLOGICAL SURGERY

## 2021-11-15 RX ORDER — HYDROCODONE BITARTRATE AND ACETAMINOPHEN 7.5; 325 MG/1; MG/1
1 TABLET ORAL EVERY 6 HOURS PRN
Qty: 60 TABLET | Refills: 0 | Status: SHIPPED | OUTPATIENT
Start: 2021-11-15 | End: 2022-04-14

## 2021-11-15 NOTE — H&P (VIEW-ONLY)
SUBJECTIVE:  Patient ID: Moriah Nicole is a 21 y.o. female is here today for follow-up.    Chief Complaint: Back pain  Chief Complaint   Patient presents with   • BACK & LEG PAIN     patient is here to discuss MRI results ( [facility] on 11/11/21) and surgical options.       HPI  21-year-old female that we have been treating for several months for a herniated L4-5 disc.  She initially did very well with conservative care and actually got back to work until about 4 weeks ago she had a recurrence of her back pain.  She has been trying to get chiropractic care on a regular basis to treat this along with anti-inflammatories, steroids, pain medication.  We repeated her MRI she is here to discuss the results.  She notes mostly of right lower extremity radicular pain numbness and tingling.  She does have some back pain and occasional left lower extremity pain.  She denies any groin or perineal numbness.  She does admit to 2 episodes of urinary incontinence.  In the last several weeks.  But for the most part she is had good control of her urine and no bowel issues.  She did do physical therapy extensively in the spring and early summer for this.    The following portions of the patient's history were reviewed and updated as appropriate: allergies, current medications, past family history, past medical history, past social history, past surgical history and problem list.    OBJECTIVE:    Review of Systems   Musculoskeletal: Positive for back pain.   Neurological: Positive for numbness.   All other systems reviewed and are negative.         Physical Exam  Eyes:      Extraocular Movements: EOM normal.      Pupils: Pupils are equal, round, and reactive to light.   Neurological:      Mental Status: She is oriented to person, place, and time.      Coordination: Finger-Nose-Finger Test normal.      Gait: Gait is intact.      Deep Tendon Reflexes: Strength normal.   Psychiatric:         Speech: Speech normal.          Neurologic Exam     Mental Status   Oriented to person, place, and time.   Attention: normal.   Speech: speech is normal   Level of consciousness: alert  Knowledge: good.     Cranial Nerves     CN II   Visual fields full to confrontation.     CN III, IV, VI   Pupils are equal, round, and reactive to light.  Extraocular motions are normal.     CN V   Facial sensation intact.     CN VII   Facial expression full, symmetric.     CN VIII   CN VIII normal.     CN IX, X   CN IX normal.   CN X normal.     CN XI   CN XI normal.     CN XII   CN XII normal.     Motor Exam   Muscle bulk: normal  Overall muscle tone: normal  Right arm pronator drift: absent  Left arm pronator drift: absent    Strength   Strength 5/5 throughout.     Sensory Exam   Light touch normal.   Pinprick normal.     Gait, Coordination, and Reflexes     Gait  Gait: normal    Coordination   Finger to nose coordination: normal    Tremor   Resting tremor: absent  Intention tremor: absent  Action tremor: absent    Reflexes   Reflexes 2+ except as noted.       Independent Review of Radiographic Studies:   MRI of the lumbar spine shows a very large central herniated disc at L4-5 with significant encroachment on the cauda equina.    ASSESSMENT/PLAN:  The patient has a very large disc herniation Krystal 4 5.  At this point I would not recommend any further conservative care given the course of this the summer.  I would recommend a right L4-5 microdiscectomy.  The risk and benefits were discussed at length which include were not limited to infection, bleeding, paralysis, spinal fluid leak, bowel bladder continence, stroke, coma, and death.  Patient knowledge understanding of this.  Her questions and concerns were addressed.  We will get her prepped and scheduled soon as possible.      1. Lumbar disc herniation    2. Lumbar radiculopathy    3. Smoker    4. Class 1 obesity due to excess calories with body mass index (BMI) of 31.0 to 31.9 in adult, unspecified  whether serious comorbidity present        The patient's Body mass index is 31.87 kg/m².. BMI is above normal parameters. Recommendations include: educational material    Return for POSTOPERATIVELY.      Bony Orellana MD

## 2021-11-15 NOTE — PATIENT INSTRUCTIONS
"PATIENT TO CONTINUE TO FOLLOW UP WITH HER PRIMARY CARE PROVIDER FOR YEARLY PHYSICAL EXAMS TO ENSURE COMPLETE HEALTH MAINTENANCE        BMI for Adults  What is BMI?  Body mass index (BMI) is a number that is calculated from a person's weight and height. BMI can help estimate how much of a person's weight is composed of fat. BMI does not measure body fat directly. Rather, it is an alternative to procedures that directly measure body fat, which can be difficult and expensive.  BMI can help identify people who may be at higher risk for certain medical problems.  What are BMI measurements used for?  BMI is used as a screening tool to identify possible weight problems. It helps determine whether a person is obese, overweight, a healthy weight, or underweight.  BMI is useful for:  · Identifying a weight problem that may be related to a medical condition or may increase the risk for medical problems.  · Promoting changes, such as changes in diet and exercise, to help reach a healthy weight. BMI screening can be repeated to see if these changes are working.  How is BMI calculated?  BMI involves measuring your weight in relation to your height. Both height and weight are measured, and the BMI is calculated from those numbers. This can be done either in English ([place] or metric measurements. Note that charts and online BMI calculators are available to help you find your BMI quickly and easily without having to do these calculations yourself.  To calculate your BMI in English ([place] measurements:    1. Measure your weight in pounds (lb).  2. Multiply the number of pounds by 703.  ? For example, for a person who weighs 180 lb, multiply that number by 703, which equals 126,540.  3. Measure your height in inches. Then multiply that number by itself to get a measurement called \"inches squared.\"  ? For example, for a person who is 70 inches tall, the \"inches squared\" measurement is 70 inches x 70 inches, which equals 4,900 inches " "squared.  4. Divide the total from step 2 (number of lb x 703) by the total from step 3 (inches squared): 126,540 ÷ 4,900 = 25.8. This is your BMI.    To calculate your BMI in metric measurements:  1. Measure your weight in kilograms (kg).  2. Measure your height in meters (m). Then multiply that number by itself to get a measurement called \"meters squared.\"  ? For example, for a person who is 1.75 m tall, the \"meters squared\" measurement is 1.75 m x 1.75 m, which is equal to 3.1 meters squared.  3. Divide the number of kilograms (your weight) by the meters squared number. In this example: 70 ÷ 3.1 = 22.6. This is your BMI.  What do the results mean?  BMI charts are used to identify whether you are underweight, normal weight, overweight, or obese. The following guidelines will be used:  · Underweight: BMI less than 18.5.  · Normal weight: BMI between 18.5 and 24.9.  · Overweight: BMI between 25 and 29.9.  · Obese: BMI of 30 or above.  Keep these notes in mind:  · Weight includes both fat and muscle, so someone with a muscular build, such as an athlete, may have a BMI that is higher than 24.9. In cases like these, BMI is not an accurate measure of body fat.  · To determine if excess body fat is the cause of a BMI of 25 or higher, further assessments may need to be done by a health care provider.  · BMI is usually interpreted in the same way for men and women.  Where to find more information  For more information about BMI, including tools to quickly calculate your BMI, go to these websites:  · Centers for Disease Control and Prevention: www.cdc.gov  · American Heart Association: www.heart.org  · National Heart, Lung, and Blood [place]: www.nhlbi.nih.gov  Summary  · Body mass index (BMI) is a number that is calculated from a person's weight and height.  · BMI may help estimate how much of a person's weight is composed of fat. BMI can help identify those who may be at higher risk for certain medical problems.  · BMI " "can be measured using English measurements or metric measurements.  · BMI charts are used to identify whether you are underweight, normal weight, overweight, or obese.  This information is not intended to replace advice given to you by your health care provider. Make sure you discuss any questions you have with your health care provider.  Document Revised: 09/09/2020 Document Reviewed: 07/17/2020  [employer] Patient Education © 2021 [employer]      https://www.nhlbi.nih.gov/files/docs/public/heart/dash_brief.pdf\">   DASH Eating Plan  DASH stands for Dietary Approaches to Stop Hypertension. The DASH eating plan is a healthy eating plan that has been shown to:  · Reduce high blood pressure (hypertension).  · Reduce your risk for type 2 diabetes, heart disease, and stroke.  · Help with weight loss.  What are tips for following this plan?  Reading food labels  · Check food labels for the amount of salt (sodium) per serving. Choose foods with less than 5 percent of the Daily Value of sodium. Generally, foods with less than 300 milligrams (mg) of sodium per serving fit into this eating plan.  · To find whole grains, look for the word \"whole\" as the first word in the ingredient list.  Shopping  · Buy products labeled as \"low-sodium\" or \"no salt added.\"  · Buy fresh foods. Avoid canned foods and pre-made or frozen meals.  Cooking  · Avoid adding salt when cooking. Use salt-free seasonings or herbs instead of table salt or sea salt. Check with your health care provider or pharmacist before using salt substitutes.  · Do not ely foods. Cook foods using healthy methods such as baking, boiling, grilling, roasting, and broiling instead.  · Cook with heart-healthy oils, such as olive, canola, avocado, soybean, or sunflower oil.  Meal planning    · Eat a balanced diet that includes:  ? 4 or more servings of fruits and 4 or more servings of vegetables each day. Try to fill one-half of your plate with fruits and vegetables.  ? 6-8 " servings of whole grains each day.  ? Less than 6 oz (170 g) of lean meat, poultry, or fish each day. A 3-oz (85-g) serving of meat is about the same size as a deck of cards. One egg equals 1 oz (28 g).  ? 2-3 servings of low-fat dairy each day. One serving is 1 cup (237 mL).  ? 1 serving of nuts, seeds, or beans 5 times each week.  ? 2-3 servings of heart-healthy fats. Healthy fats called omega-3 fatty acids are found in foods such as walnuts, flaxseeds, fortified milks, and eggs. These fats are also found in cold-water fish, such as sardines, salmon, and mackerel.  · Limit how much you eat of:  ? Canned or prepackaged foods.  ? Food that is high in trans fat, such as some fried foods.  ? Food that is high in saturated fat, such as fatty meat.  ? Desserts and other sweets, sugary drinks, and other foods with added sugar.  ? Full-fat dairy products.  · Do not salt foods before eating.  · Do not eat more than 4 egg yolks a week.  · Try to eat at least 2 vegetarian meals a week.  · Eat more home-cooked food and less restaurant, buffet, and fast food.    Lifestyle  · When eating at a restaurant, ask that your food be prepared with less salt or no salt, if possible.  · If you drink alcohol:  ? Limit how much you use to:  § 0-1 drink a day for women who are not pregnant.  § 0-2 drinks a day for men.  ? Be aware of how much alcohol is in your drink. In the [place] one drink equals one 12 oz bottle of beer (355 mL), one 5 oz glass of wine (148 mL), or one 1½ oz glass of hard liquor (44 mL).  General information  · Avoid eating more than 2,300 mg of salt a day. If you have hypertension, you may need to reduce your sodium intake to 1,500 mg a day.  · Work with your health care provider to maintain a healthy body weight or to lose weight. Ask what an ideal weight is for you.  · Get at least 30 minutes of exercise that causes your heart to beat faster (aerobic exercise) most days of the week. Activities may include walking,  swimming, or biking.  · Work with your health care provider or dietitian to adjust your eating plan to your individual calorie needs.  What foods should I eat?  Fruits  All fresh, dried, or frozen fruit. Canned fruit in natural juice (without added sugar).  Vegetables  Fresh or frozen vegetables (raw, steamed, roasted, or grilled). Low-sodium or reduced-sodium tomato and vegetable juice. Low-sodium or reduced-sodium tomato sauce and tomato paste. Low-sodium or reduced-sodium canned vegetables.  Grains  Whole-grain or whole-wheat bread. Whole-grain or whole-wheat pasta. Brown rice. Oatmeal. Quinoa. Bulgur. Whole-grain and low-sodium cereals. Gifty bread. Low-fat, low-sodium crackers. Whole-wheat flour tortillas.  Meats and other proteins  Skinless chicken or turkey. Ground chicken or turkey. Pork with fat trimmed off. Fish and seafood. Egg whites. Dried beans, peas, or lentils. Unsalted nuts, nut butters, and seeds. Unsalted canned beans. Lean cuts of beef with fat trimmed off. Low-sodium, lean precooked or cured meat, such as sausages or meat loaves.  Dairy  Low-fat (1%) or fat-free (skim) milk. Reduced-fat, low-fat, or fat-free cheeses. Nonfat, low-sodium ricotta or cottage cheese. Low-fat or nonfat yogurt. Low-fat, low-sodium cheese.  Fats and oils  Soft margarine without trans fats. Vegetable oil. Reduced-fat, low-fat, or light mayonnaise and salad dressings (reduced-sodium). Canola, safflower, olive, avocado, soybean, and sunflower oils. Avocado.  Seasonings and condiments  Herbs. Spices. Seasoning mixes without salt.  Other foods  Unsalted popcorn and pretzels. Fat-free sweets.  The items listed above may not be a complete list of foods and beverages you can eat. Contact a dietitian for more information.  What foods should I avoid?  Fruits  Canned fruit in a light or heavy syrup. Fried fruit. Fruit in cream or butter sauce.  Vegetables  Creamed or fried vegetables. Vegetables in a cheese sauce. Regular canned  vegetables (not low-sodium or reduced-sodium). Regular canned tomato sauce and paste (not low-sodium or reduced-sodium). Regular tomato and vegetable juice (not low-sodium or reduced-sodium). Pickles. Olives.  Grains  Baked goods made with fat, such as croissants, muffins, or some breads. Dry pasta or rice meal packs.  Meats and other proteins  Fatty cuts of meat. Ribs. Fried meat. Guerra. Bologna, salami, and other precooked or cured meats, such as sausages or meat loaves. Fat from the back of a pig (fatback). Bratwurst. Salted nuts and seeds. Canned beans with added salt. Canned or smoked fish. Whole eggs or egg yolks. Chicken or turkey with skin.  Dairy  Whole or 2% milk, cream, and half-and-half. Whole or full-fat cream cheese. Whole-fat or sweetened yogurt. Full-fat cheese. Nondairy creamers. Whipped toppings. Processed cheese and cheese spreads.  Fats and oils  Butter. Stick margarine. Lard. Shortening. Ghee. Guerra fat. Tropical oils, such as coconut, palm kernel, or palm oil.  Seasonings and condiments  Onion salt, garlic salt, seasoned salt, table salt, and sea salt. Worcestershire sauce. Tartar sauce. Barbecue sauce. Teriyaki sauce. Soy sauce, including reduced-sodium. Steak sauce. Canned and packaged gravies. Fish sauce. Oyster sauce. Cocktail sauce. Store-bought horseradish. Ketchup. Mustard. Meat flavorings and tenderizers. Bouillon cubes. Hot sauces. Pre-made or packaged marinades. Pre-made or packaged taco seasonings. Relishes. Regular salad dressings.  Other foods  Salted popcorn and pretzels.  The items listed above may not be a complete list of foods and beverages you should avoid. Contact a dietitian for more information.  Where to find more information  · National Heart, Lung, and Blood [place]: www.nhlbi.nih.gov  · American Heart Association: www.heart.org  · Academy of Nutrition and Dietetics: www.eatright.org  · National Kidney Foundation: www.kidney.org  Summary  · The DASH eating plan is a  healthy eating plan that has been shown to reduce high blood pressure (hypertension). It may also reduce your risk for type 2 diabetes, heart disease, and stroke.  · When on the DASH eating plan, aim to eat more fresh fruits and vegetables, whole grains, lean proteins, low-fat dairy, and heart-healthy fats.  · With the DASH eating plan, you should limit salt (sodium) intake to 2,300 mg a day. If you have hypertension, you may need to reduce your sodium intake to 1,500 mg a day.  · Work with your health care provider or dietitian to adjust your eating plan to your individual calorie needs.  This information is not intended to replace advice given to you by your health care provider. Make sure you discuss any questions you have with your health care provider.  Document Revised: 11/20/2020 Document Reviewed: 11/20/2020  [employer] Patient Education © 2021 [employer]      Steps to Quit Smoking  Smoking tobacco is the leading cause of preventable death. It can affect almost every organ in the body. Smoking puts you and those around you at risk for developing many serious chronic diseases. Quitting smoking can be difficult, but it is one of the best things that you can do for your health. It is never too late to quit.  How do I get ready to quit?  When you decide to quit smoking, create a plan to help you succeed. Before you quit:  · Pick a date to quit. Set a date within the next 2 weeks to give you time to prepare.  · Write down the reasons why you are quitting. Keep this list in places where you will see it often.  · Tell your family, friends, and co-workers that you are quitting. Support from your loved ones can make quitting easier.  · Talk with your health care provider about your options for quitting smoking.  · Find out what treatment options are covered by your health insurance.  · Identify people, places, things, and activities that make you want to smoke (triggers). Avoid them.  What first steps can I take to  quit smoking?  · Throw away all cigarettes at home, at work, and in your car.  · Throw away smoking accessories, such as ashtrays and lighters.  · Clean your car. Make sure to empty the ashtray.  · Clean your home, including curtains and carpets.  What strategies can I use to quit smoking?  Talk with your health care provider about combining strategies, such as taking medicines while you are also receiving in-person counseling. Using these two strategies together makes you more likely to succeed in quitting than if you used either strategy on its own.  · If you are pregnant or breastfeeding, talk with your health care provider about finding counseling or other support strategies to quit smoking. Do not take medicine to help you quit smoking unless your health care provider tells you to do so.  To quit smoking:  Quit right away  · Quit smoking completely, instead of gradually reducing how much you smoke over a period of time. Research shows that stopping smoking right away is more successful than gradually quitting.  · Attend in-person counseling to help you build problem-solving skills. You are more likely to succeed in quitting if you attend counseling sessions regularly. Even short sessions of 10 minutes can be effective.  Take medicine  You may take medicines to help you quit smoking. Some medicines require a prescription and some you can purchase over-the-counter. Medicines may have nicotine in them to replace the nicotine in cigarettes. Medicines may:  · Help to stop cravings.  · Help to relieve withdrawal symptoms.  Your health care provider may recommend:  · Nicotine patches, gum, or lozenges.  · Nicotine inhalers or sprays.  · Non-nicotine medicine that is taken by mouth.  Find resources  Find resources and support systems that can help you to quit smoking and remain smoke-free after you quit. These resources are most helpful when you use them often. They include:  · Online chats with a  counselor.  · Telephone quitlines.  · Printed self-help materials.  · Support groups or group counseling.  · Text messaging programs.  · Mobile phone apps or applications. Use apps that can help you stick to your quit plan by providing reminders, tips, and encouragement. There are many free apps for mobile devices as well as websites. Examples include Quit Guide from the CDC and smokefree.gov  What things can I do to make it easier to quit?    · Reach out to your family and friends for support and encouragement. Call telephone quitlines (1-800-QUIT-NOW), reach out to support groups, or work with a counselor for support.  · Ask people who smoke to avoid smoking around you.  · Avoid places that trigger you to smoke, such as bars, parties, or smoke-break areas at work.  · Spend time with people who do not smoke.  · Lessen the stress in your life. Stress can be a smoking trigger for some people. To lessen stress, try:  ? Exercising regularly.  ? Doing deep-breathing exercises.  ? Doing yoga.  ? Meditating.  ? Performing a body scan. This involves closing your eyes, scanning your body from head to toe, and noticing which parts of your body are particularly tense. Try to relax the muscles in those areas.  How will I feel when I quit smoking?  Day 1 to 3 weeks  Within the first 24 hours of quitting smoking, you may start to feel withdrawal symptoms. These symptoms are usually most noticeable 2-3 days after quitting, but they usually do not last for more than 2-3 weeks. You may experience these symptoms:  · Mood swings.  · Restlessness, anxiety, or irritability.  · Trouble concentrating.  · Dizziness.  · Strong cravings for sugary foods and nicotine.  · Mild weight gain.  · Constipation.  · Nausea.  · Coughing or a sore throat.  · Changes in how the medicines that you take for unrelated issues work in your body.  · Depression.  · Trouble sleeping (insomnia).  Week 3 and afterward  After the first 2-3 weeks of quitting, you  may start to notice more positive results, such as:  · Improved sense of smell and taste.  · Decreased coughing and sore throat.  · Slower heart rate.  · Lower blood pressure.  · Clearer skin.  · The ability to breathe more easily.  · Fewer sick days.  Quitting smoking can be very challenging. Do not get discouraged if you are not successful the first time. Some people need to make many attempts to quit before they achieve long-term success. Do your best to stick to your quit plan, and talk with your health care provider if you have any questions or concerns.  Summary  · Smoking tobacco is the leading cause of preventable death. Quitting smoking is one of the best things that you can do for your health.  · When you decide to quit smoking, create a plan to help you succeed.  · Quit smoking right away, not slowly over a period of time.  · When you start quitting, seek help from your health care provider, family, or friends.  This information is not intended to replace advice given to you by your health care provider. Make sure you discuss any questions you have with your health care provider.  Document Revised: 09/11/2020 Document Reviewed: 03/07/2020  [employer] Patient Education © 2021 [employer]      Tobacco Use Disorder  Tobacco use disorder (TUD) occurs when a person craves, seeks, and uses tobacco, regardless of the consequences. This disorder can cause problems with mental and physical health. It can affect your ability to have healthy relationships, and it can keep you from meeting your responsibilities at work, home, or school.  Tobacco may be:  · Smoked as a cigarette or cigar.  · Inhaled using e-cigarettes.  · Smoked in a pipe or hookah.  · Chewed as smokeless tobacco.  · Inhaled into the nostrils as snuff.  Tobacco products contain a dangerous chemical called nicotine, which is very addictive. Nicotine triggers hormones that make the body feel stimulated and works on areas of the brain that make you feel  good. These effects can make it hard for people to quit nicotine.  Tobacco contains many other unsafe chemicals that can damage almost every organ in the body. Smoking tobacco also puts others in danger due to fire risk and possible health problems caused by breathing in secondhand smoke.  What are the signs or symptoms?  Symptoms of TUD may include:  · Being unable to slow down or stop your tobacco use.  · Spending an abnormal amount of time getting or using tobacco.  · Craving tobacco. Cravings may last for up to 6 months after quitting.  · Tobacco use that:  ? Interferes with your work, school, or home life.  ? Interferes with your personal and social relationships.  ? Makes you give up activities that you once enjoyed or found important.  · Using tobacco even though you know that it is:  ? Dangerous or bad for your health or someone else's health.  ? Causing problems in your life.  · Needing more and more of the substance to get the same effect (developing tolerance).  · Experiencing unpleasant symptoms if you do not use the substance (withdrawal). Withdrawal symptoms may include:  ? Depressed, anxious, or irritable mood.  ? Difficulty concentrating.  ? Increased appetite.  ? Restlessness or trouble sleeping.  · Using the substance to avoid withdrawal.  How is this diagnosed?  This condition may be diagnosed based on:  · Your current and past tobacco use. Your health care provider may ask questions about how your tobacco use affects your life.  · A physical exam.  You may be diagnosed with TUD if you have at least two symptoms within a 12-month period.  How is this treated?  This condition is treated by stopping tobacco use. Many people are unable to quit on their own and need help. Treatment may include:  · Nicotine replacement therapy (NRT). NRT provides nicotine without the other harmful chemicals in tobacco. NRT gradually lowers the dosage of nicotine in the body and reduces withdrawal symptoms. NRT is  available as:  ? Over-the-counter gums, lozenges, and skin patches.  ? Prescription mouth inhalers and nasal sprays.  · Medicine that acts on the brain to reduce cravings and withdrawal symptoms.  · A type of talk therapy that examines your triggers for tobacco use, how to avoid them, and how to cope with cravings (behavioral therapy).  · Hypnosis. This may help with withdrawal symptoms.  · Joining a support group for others coping with TUD.  The best treatment for TUD is usually a combination of medicine, talk therapy, and support groups. Recovery can be a long process. Many people start using tobacco again after stopping (relapse). If you relapse, it does not mean that treatment will not work.  Follow these instructions at home:    Lifestyle  · Do not use any products that contain nicotine or tobacco, such as cigarettes and e-cigarettes.  · Avoid things that trigger tobacco use as much as you can. Triggers include people and situations that usually cause you to use tobacco.  · Avoid drinks that contain caffeine, including coffee. These may worsen some withdrawal symptoms.  · Find ways to manage stress. Wanting to smoke may cause stress, and stress can make you want to smoke. Relaxation techniques such as deep breathing, meditation, and yoga may help.  · Attend support groups as needed. These groups are an important part of long-term recovery for many people.  General instructions  · Take over-the-counter and prescription medicines only as told by your health care provider.  · Check with your health care provider before taking any new prescription or over-the-counter medicines.  · Decide on a friend, family member, or smoking quit-line (such as 1-800-QUIT-NOW in the [place] that you can call or text when you feel the urge to smoke or when you need help coping with cravings.  · Keep all follow-up visits as told by your health care provider and therapist. This is important.  Contact a health care provider if:  · You  are not able to take your medicines as prescribed.  · Your symptoms get worse, even with treatment.  Summary  · Tobacco use disorder (TUD) occurs when a person craves, seeks, and uses tobacco regardless of the consequences.  · This condition may be diagnosed based on your current and past tobacco use and a physical exam.  · Many people are unable to quit on their own and need help. Recovery can be a long process.  · The most effective treatment for TUD is usually a combination of medicine, talk therapy, and support groups.  This information is not intended to replace advice given to you by your health care provider. Make sure you discuss any questions you have with your health care provider.  Document Revised: 12/05/2018 Document Reviewed: 12/05/2018  [employer] Patient Education © 2021 [employer]

## 2021-11-16 DIAGNOSIS — Z01.818 PREOP TESTING: Primary | ICD-10-CM

## 2021-11-18 ENCOUNTER — PRE-ADMISSION TESTING (OUTPATIENT)
Dept: PREADMISSION TESTING | Facility: HOSPITAL | Age: 21
End: 2021-11-18

## 2021-11-18 ENCOUNTER — LAB (OUTPATIENT)
Dept: LAB | Facility: HOSPITAL | Age: 21
End: 2021-11-18

## 2021-11-18 VITALS
OXYGEN SATURATION: 100 % | BODY MASS INDEX: 30.67 KG/M2 | WEIGHT: 184.08 LBS | RESPIRATION RATE: 18 BRPM | HEART RATE: 92 BPM | DIASTOLIC BLOOD PRESSURE: 81 MMHG | HEIGHT: 65 IN | SYSTOLIC BLOOD PRESSURE: 138 MMHG

## 2021-11-18 DIAGNOSIS — Z01.818 PREOP TESTING: ICD-10-CM

## 2021-11-18 DIAGNOSIS — M51.26 LUMBAR DISC HERNIATION: ICD-10-CM

## 2021-11-18 LAB
ABO GROUP BLD: NORMAL
ALBUMIN SERPL-MCNC: 5 G/DL (ref 3.5–5.2)
ALBUMIN/GLOB SERPL: 2 G/DL
ALP SERPL-CCNC: 89 U/L (ref 39–117)
ALT SERPL W P-5'-P-CCNC: 14 U/L (ref 1–33)
ANION GAP SERPL CALCULATED.3IONS-SCNC: 12 MMOL/L (ref 5–15)
AST SERPL-CCNC: 17 U/L (ref 1–32)
BILIRUB SERPL-MCNC: 0.4 MG/DL (ref 0–1.2)
BILIRUB UR QL STRIP: NEGATIVE
BLD GP AB SCN SERPL QL: NEGATIVE
BUN SERPL-MCNC: 11 MG/DL (ref 6–20)
BUN/CREAT SERPL: 21.6 (ref 7–25)
CALCIUM SPEC-SCNC: 9.7 MG/DL (ref 8.6–10.5)
CHLORIDE SERPL-SCNC: 101 MMOL/L (ref 98–107)
CLARITY UR: CLEAR
CO2 SERPL-SCNC: 28 MMOL/L (ref 22–29)
COLOR UR: YELLOW
CREAT SERPL-MCNC: 0.51 MG/DL (ref 0.57–1)
DEPRECATED RDW RBC AUTO: 46.3 FL (ref 37–54)
ERYTHROCYTE [DISTWIDTH] IN BLOOD BY AUTOMATED COUNT: 15.2 % (ref 12.3–15.4)
GFR SERPL CREATININE-BSD FRML MDRD: >150 ML/MIN/1.73
GLOBULIN UR ELPH-MCNC: 2.5 GM/DL
GLUCOSE SERPL-MCNC: 82 MG/DL (ref 65–99)
GLUCOSE UR STRIP-MCNC: NEGATIVE MG/DL
HCT VFR BLD AUTO: 43 % (ref 34–46.6)
HGB BLD-MCNC: 13.5 G/DL (ref 12–15.9)
HGB UR QL STRIP.AUTO: NEGATIVE
KETONES UR QL STRIP: NEGATIVE
LEUKOCYTE ESTERASE UR QL STRIP.AUTO: NEGATIVE
MCH RBC QN AUTO: 26.2 PG (ref 26.6–33)
MCHC RBC AUTO-ENTMCNC: 31.4 G/DL (ref 31.5–35.7)
MCV RBC AUTO: 83.3 FL (ref 79–97)
NITRITE UR QL STRIP: NEGATIVE
PH UR STRIP.AUTO: 7 [PH] (ref 5–8)
PLATELET # BLD AUTO: 331 10*3/MM3 (ref 140–450)
PMV BLD AUTO: 9.4 FL (ref 6–12)
POTASSIUM SERPL-SCNC: 4 MMOL/L (ref 3.5–5.2)
PROT SERPL-MCNC: 7.5 G/DL (ref 6–8.5)
PROT UR QL STRIP: NEGATIVE
RBC # BLD AUTO: 5.16 10*6/MM3 (ref 3.77–5.28)
RH BLD: POSITIVE
SARS-COV-2 RNA PNL SPEC NAA+PROBE: NOT DETECTED
SODIUM SERPL-SCNC: 141 MMOL/L (ref 136–145)
SP GR UR STRIP: 1.02 (ref 1–1.03)
T&S EXPIRATION DATE: NORMAL
UROBILINOGEN UR QL STRIP: NORMAL
WBC NRBC COR # BLD: 8.66 10*3/MM3 (ref 3.4–10.8)

## 2021-11-18 PROCEDURE — 80053 COMPREHEN METABOLIC PANEL: CPT

## 2021-11-18 PROCEDURE — 86850 RBC ANTIBODY SCREEN: CPT

## 2021-11-18 PROCEDURE — 87635 SARS-COV-2 COVID-19 AMP PRB: CPT

## 2021-11-18 PROCEDURE — C9803 HOPD COVID-19 SPEC COLLECT: HCPCS

## 2021-11-18 PROCEDURE — 85027 COMPLETE CBC AUTOMATED: CPT

## 2021-11-18 PROCEDURE — 81003 URINALYSIS AUTO W/O SCOPE: CPT

## 2021-11-18 PROCEDURE — 36415 COLL VENOUS BLD VENIPUNCTURE: CPT

## 2021-11-18 PROCEDURE — 86900 BLOOD TYPING SEROLOGIC ABO: CPT

## 2021-11-18 PROCEDURE — 86901 BLOOD TYPING SEROLOGIC RH(D): CPT

## 2021-11-18 NOTE — DISCHARGE INSTRUCTIONS
DAY OF SURGERY INSTRUCTIONS          ARRIVAL TIME: AS DIRECTED BY OFFICE    YOU MAY TAKE THE FOLLOWING MEDICATION(S) THE MORNING OF SURGERY WITH A SIP OF WATER: NONE AS DIRECTED BY PROVIDER      ALL OTHER HOME MEDICATION CHECK WITH YOUR PHYSICIAN      DO NOT TAKE ANY ERECTILE DYSFUNCTION MEDICATIONS (EX: CIALIS, VIAGRA) 24 HOURS PRIOR TO SURGERY                      MANAGING PAIN AFTER SURGERY    We know you are probably wondering what your pain will be like after surgery.  Following surgery it is unrealistic to expect you will not have pain.   Pain is how our bodies let us know that something is wrong or cautions us to be careful.  That said, our goal is to make your pain tolerable.    Methods we may use to treat your pain include (oral or IV medications, PCAs, epidurals, nerve blocks, etc.)   While some procedures require IV pain medications for a short time after surgery, transitioning to pain medications by mouth allows for better management of pain.   Your nurse will encourage you to take oral pain medications whenever possible.  IV medications work almost immediately, but only last a short while.  Taking medications by mouth allows for a more constant level of medication in your blood stream for a longer period of time.      Once your pain is out of control it is harder to get back under control.  It is important you are aware when your next dose of pain medication is due.  If you are admitted, your nurse may write the time of your next dose on the white board in your room to help you remember.      We are interested in your pain and encourage you to inform us about aggravating factors during your visit.   Many times a simple repositioning every few hours can make a big difference.    If your physician says it is okay, do not let your pain prevent you from getting out of bed. Be sure to call your nurse for assistance prior to getting up so you do not fall.      Before surgery, please decide your tolerable  pain goal.  These faces help describe the pain ratings we use on a 0-10 scale.   Be prepared to tell us your goal and whether or not you take pain or anxiety medications at home.          BEFORE YOU COME TO THE HOSPITAL  (Pre-op instructions)  • Do not eat, drink, smoke or chew gum after midnight the night before surgery.  This also includes no mints.  • Morning of surgery take only the medicines you have been instructed with a sip of water unless otherwise instructed  by your physician.  • Do not shave, wear makeup or dark nail polish.  • Remove all jewelry including rings.  • Leave anything you consider valuable at home.  • Leave your suitcase in the car until after your surgery.  • Bring the following with you if applicable:  o Picture ID and insurance, Medicare or Medicaid cards  o Co-pay/deductible required by insurance (cash, check, credit card)  o Copy of advance directive, living will or power-of- documents if not brought to pre-work  o CPAP or BIPAP mask and tubing  o Relaxation aids ( book, magazine), etc.  o Hearing aids                        ON THE DAY OF SURGERY  · On the day of surgery check in at registration located at the main entrance of the hospital. Only one family member or friend are allowed per patient.  ? You will be registered and given a beeper with instructions where to wait in the main lobby.  ? When your beeper lights up and vibrates a member of the Outpatient Surgery staff will meet you at the double doors under the stair steps and escort you to your preoperative room.   · You may have cloth compression devices placed on your legs. These help to prevent blood clots and reduce swelling in your legs.  · An IV may be inserted into one of your veins.  · In the operating room, you may be given one or more of the following:  ? A medicine to help you relax (sedative).  ? A medicine to numb the area (local anesthetic).  ? A medicine to make you fall asleep (general anesthetic).  ? A  "medicine that is injected into an area of your body to numb everything below the injection site (regional anesthetic).  · Your surgical site will be marked or identified.  · You may be given an antibiotic through your IV to help prevent infection.  Contact a health care provider if you:  · Develop a fever of more than 100.4°F (38°C) or other feelings of illness during the 48 hours before your surgery.  · Have symptoms that get worse.  Have questions or concerns about your surgery    General Anesthesia/Surgery, Adult  General anesthesia is the use of medicines to make a person \"go to sleep\" (unconscious) for a medical procedure. General anesthesia must be used for certain procedures, and is often recommended for procedures that:  · Last a long time.  · Require you to be still or in an unusual position.  · Are major and can cause blood loss.  The medicines used for general anesthesia are called general anesthetics. As well as making you unconscious for a certain amount of time, these medicines:  · Prevent pain.  · Control your blood pressure.  · Relax your muscles.  Tell a health care provider about:  · Any allergies you have.  · All medicines you are taking, including vitamins, herbs, eye drops, creams, and over-the-counter medicines.  · Any problems you or family members have had with anesthetic medicines.  · Types of anesthetics you have had in the past.  · Any blood disorders you have.  · Any surgeries you have had.  · Any medical conditions you have.  · Any recent upper respiratory, chest, or ear infections.  · Any history of:  ? Heart or lung conditions, such as heart failure, sleep apnea, asthma, or chronic obstructive pulmonary disease (COPD).  ?  service.  ? Depression or anxiety.  · Any tobacco or drug use, including marijuana or alcohol use.  · Whether you are pregnant or may be pregnant.  What are the risks?  Generally, this is a safe procedure. However, problems may occur, including:  · Allergic " reaction.  · Lung and heart problems.  · Inhaling food or liquid from the stomach into the lungs (aspiration).  · Nerve injury.  · Air in the bloodstream, which can lead to stroke.  · Extreme agitation or confusion (delirium) when you wake up from the anesthetic.  · Waking up during your procedure and being unable to move. This is rare.  These problems are more likely to develop if you are having a major surgery or if you have an advanced or serious medical condition. You can prevent some of these complications by answering all of your health care provider's questions thoroughly and by following all instructions before your procedure.  General anesthesia can cause side effects, including:  · Nausea or vomiting.  · A sore throat from the breathing tube.  · Hoarseness.  · Wheezing or coughing.  · Shaking chills.  · Tiredness.  · Body aches.  · Anxiety.  · Sleepiness or drowsiness.  · Confusion or agitation.  RISKS AND COMPLICATIONS OF SURGERY  Your health care provider will discuss possible risks and complications with you before surgery. Common risks and complications include:    · Problems due to the use of anesthetics.  · Blood loss and replacement (does not apply to minor surgical procedures).  · Temporary increase in pain due to surgery.  · Uncorrected pain or problems that the surgery was meant to correct.  · Infection.  · New damage.    What happens before the procedure?    Medicines  Ask your health care provider about:  · Changing or stopping your regular medicines. This is especially important if you are taking diabetes medicines or blood thinners.  · Taking medicines such as aspirin and ibuprofen. These medicines can thin your blood. Do not take these medicines unless your health care provider tells you to take them.  · Taking over-the-counter medicines, vitamins, herbs, and supplements. Do not take these during the week before your procedure unless your health care provider approves them.  General  instructions  · Starting 3-6 weeks before the procedure, do not use any products that contain nicotine or tobacco, such as cigarettes and e-cigarettes. If you need help quitting, ask your health care provider.  · If you brush your teeth on the morning of the procedure, make sure to spit out all of the toothpaste.  · Tell your health care provider if you become ill or develop a cold, cough, or fever.  · If instructed by your health care provider, bring your sleep apnea device with you on the day of your surgery (if applicable).  · Ask your health care provider if you will be going home the same day, the following day, or after a longer hospital stay.  ? Plan to have someone take you home from the hospital or clinic.  ? Plan to have a responsible adult care for you for at least 24 hours after you leave the hospital or clinic. This is important.  What happens during the procedure?  · You will be given anesthetics through both of the following:  ? A mask placed over your nose and mouth.  ? An IV in one of your veins.  · You may receive a medicine to help you relax (sedative).  · After you are unconscious, a breathing tube may be inserted down your throat to help you breathe. This will be removed before you wake up.  · An anesthesia specialist will stay with you throughout your procedure. He or she will:  ? Keep you comfortable and safe by continuing to give you medicines and adjusting the amount of medicine that you get.  ? Monitor your blood pressure, pulse, and oxygen levels to make sure that the anesthetics do not cause any problems.  The procedure may vary among health care providers and hospitals.  What happens after the procedure?  · Your blood pressure, temperature, heart rate, breathing rate, and blood oxygen level will be monitored until the medicines you were given have worn off.  · You will wake up in a recovery area. You may wake up slowly.  · If you feel anxious or agitated, you may be given medicine to  help you calm down.  · If you will be going home the same day, your health care provider may check to make sure you can walk, drink, and urinate.  · Your health care provider will treat any pain or side effects you have before you go home.  · Do not drive for 24 hours if you were given a sedative.  Summary  · General anesthesia is used to keep you still and prevent pain during a procedure.  · It is important to tell your healthcare provider about your medical history and any surgeries you have had, and previous experience with anesthesia.  · Follow your healthcare provider’s instructions about when to stop eating, drinking, or taking certain medicines before your procedure.  · Plan to have someone take you home from the hospital or clinic.  This information is not intended to replace advice given to you by your health care provider. Make sure you discuss any questions you have with your health care provider.  Document Released: 03/26/2009 Document Revised: 08/03/2018 Document Reviewed: 08/03/2018  [employer] Interactive Patient Education © 2019 [employer]       Fall Prevention in Hospitals, Adult  As a hospital patient, your condition and the treatments you receive can increase your risk for falls. Some additional risk factors for falls in a hospital include:  · Being in an unfamiliar environment.  · Being on bed rest.  · Your surgery.  · Taking certain medicines.  · Your tubing requirements, such as intravenous (IV) therapy or catheters.  It is important that you learn how to decrease fall risks while at the hospital. Below are important tips that can help prevent falls.  SAFETY TIPS FOR PREVENTING FALLS  Talk about your risk of falling.  · Ask your health care provider why you are at risk for falling. Is it your medicine, illness, tubing placement, or something else?  · Make a plan with your health care provider to keep you safe from falls.  · Ask your health care provider or pharmacist about side effects of your  medicines. Some medicines can make you dizzy or affect your coordination.  Ask for help.  · Ask for help before getting out of bed. You may need to press your call button.  · Ask for assistance in getting safely to the toilet.  · Ask for a walker or cane to be put at your bedside. Ask that most of the side rails on your bed be placed up before your health care provider leaves the room.  · Ask family or friends to sit with you.  · Ask for things that are out of your reach, such as your glasses, hearing aids, telephone, bedside table, or call button.  Follow these tips to avoid falling:  · Stay lying or seated, rather than standing, while waiting for help.  · Wear rubber-soled slippers or shoes whenever you walk in the hospital.  · Avoid quick, sudden movements.  ¨ Change positions slowly.  ¨ Sit on the side of your bed before standing.  ¨ Stand up slowly and wait before you start to walk.  · Let your health care provider know if there is a spill on the floor.  · Pay careful attention to the medical equipment, electrical cords, and tubes around you.  · When you need help, use your call button by your bed or in the bathroom. Wait for one of your health care providers to help you.  · If you feel dizzy or unsure of your footing, return to bed and wait for assistance.  · Avoid being distracted by the TV, telephone, or another person in your room.  · Do not lean or support yourself on rolling objects, such as IV poles or bedside tables.     This information is not intended to replace advice given to you by your health care provider. Make sure you discuss any questions you have with your health care provider.     Document Released: 2001 Document Revised: 01/08/2016 Document Reviewed: 08/25/2013  [employer] Interactive Patient Education ©2016 [employer]       [facility]  CHG 4% Patient Instruction Sheet    Chlorhexidine Before Surgery  Chlorhexidine gluconate (CHG) is a germ-killing (antiseptic) solution  that is used to clean the skin. It gets rid of the bacteria that normally live on the skin. Cleaning your skin with CHG before surgery helps lower the risk for infection after surgery.    How to use CHG solution  · You will take 2 showers, one shower the night before surgery, the second shower the morning of surgery before coming to the hospital.  · Use CHG only as told by your health care provider, and follow the instructions on the label.  · Use CHG solution while taking a shower. Follow these steps when using CHG solution (unless your health care provider gives you different instructions):  1. Start the shower.  2. Use your normal soap and shampoo to wash your face and hair.  3. Turn off the shower or move out of the shower stream.  4. Pour the CHG onto a clean washcloth. Do not use any type of brush or rough-edged sponge.  5. Starting at your neck, lather your body down to your toes. Make sure you:  6. Pay special attention to the part of your body where you will be having surgery. Scrub this area for at least 1 minute.  7. Use the full amount of CHG as directed. Usually, this is one half bottle for each shower.  8. Do not use CHG on your head or face. If the solution gets into your ears or eyes, rinse them well with water.  9. Avoid your genital area.  10. Avoid any areas of skin that have broken skin, cuts, or scrapes.  11. Scrub your back and under your arms. Make sure to wash skin folds.  12. Let the lather sit on your skin for 1-2 minutes or as long as told by your health care  provider.  13. Thoroughly rinse your entire body in the shower. Make sure that all body creases and crevices are rinsed well.  14. Dry off with a clean towel. Do not put any substances on your body afterward, such as powder, lotion, or perfume.  15. Put on clean clothes or pajamas.  16. If it is the night before your surgery, sleep in clean sheets.    What are the risks?  Risks of using CHG include:  · A skin reaction.  · Hearing  loss, if CHG gets in your ears.  · Eye injury, if CHG gets in your eyes and is not rinsed out.  · The CHG product catching fire.  Make sure that you avoid smoking and flames after applying CHG to your skin.  Do not use CHG:  · If you have a chlorhexidine allergy or have previously reacted to chlorhexidine.  · On babies younger than 2 months of age.      On the day of surgery, when you are taken to your room in Outpatient Surgery you will be given a CHG prepackaged cloth to wipe the site for your surgery.  How to use CHG prepackaged cloths  · Follow the instructions on the label.  · Use the CHG cloth on clean, dry skin. Follow these steps when using a CHG cloth (unless your health care provider gives you different instructions):  1. Using the CHG cloth, vigorously scrub the part of your body where you will be having surgery. Scrub using a back-and-forth motion for 3 minutes. The area on your body should be completely wet with CHG when you are finished scrubbing.  2. Do not rinse. Discard the cloth and let the area air-dry for 1 minute. Do not put any substances on your body afterward, such as powder, lotion, or perfume.  Contact a health care provider if:  · Your skin gets irritated after scrubbing.  · You have questions about using your solution or cloth.  Get help right away if:  · Your eyes become very red or swollen.  · Your eyes itch badly.  · Your skin itches badly and is red or swollen.  · Your hearing changes.  · You have trouble seeing.  · You have swelling or tingling in your mouth or throat.  · You have trouble breathing.  · You swallow any chlorhexidine.  Summary  · Chlorhexidine gluconate (CHG) is a germ-killing (antiseptic) solution that is used to clean the skin. Cleaning your skin with CHG before surgery helps lower the risk for infection after surgery.  · You may be given CHG to use at home. It may be in a bottle or in a prepackaged cloth to use on your skin. Carefully follow your health care  provider's instructions and the instructions on the product label.  · Do not use CHG if you have a chlorhexidine allergy.  · Contact your health care provider if your skin gets irritated after scrubbing.  This information is not intended to replace advice given to you by your health care provider. Make sure you discuss any questions you have with your health care provider.  Document Released: 09/11/2013 Document Revised: 11/15/2018 Document Reviewed: 11/15/2018  [email or web address] Interactive Patient Education © 2019 [employer]          PATIENT/FAMILY/RESPONSIBLE PARTY VERBALIZES UNDERSTANDING OF ABOVE EDUCATION.  COPY OF PAIN SCALE GIVEN AND REVIEWED WITH VERBALIZED UNDERSTANDING.

## 2021-11-19 ENCOUNTER — ANESTHESIA EVENT (OUTPATIENT)
Dept: PERIOP | Facility: HOSPITAL | Age: 21
End: 2021-11-19

## 2021-11-19 ENCOUNTER — HOSPITAL ENCOUNTER (OUTPATIENT)
Facility: HOSPITAL | Age: 21
Setting detail: HOSPITAL OUTPATIENT SURGERY
Discharge: HOME | End: 2021-11-19
Attending: NEUROLOGICAL SURGERY | Admitting: NEUROLOGICAL SURGERY

## 2021-11-19 ENCOUNTER — ANESTHESIA (OUTPATIENT)
Dept: PERIOP | Facility: HOSPITAL | Age: 21
End: 2021-11-19

## 2021-11-19 ENCOUNTER — APPOINTMENT (OUTPATIENT)
Dept: GENERAL RADIOLOGY | Facility: HOSPITAL | Age: 21
End: 2021-11-19

## 2021-11-19 VITALS
TEMPERATURE: 97.3 F | HEART RATE: 88 BPM | DIASTOLIC BLOOD PRESSURE: 82 MMHG | RESPIRATION RATE: 16 BRPM | OXYGEN SATURATION: 99 % | SYSTOLIC BLOOD PRESSURE: 120 MMHG

## 2021-11-19 DIAGNOSIS — M51.26 LUMBAR DISC HERNIATION: ICD-10-CM

## 2021-11-19 LAB — B-HCG UR QL: NEGATIVE

## 2021-11-19 PROCEDURE — 25010000002 CEFAZOLIN PER 500 MG: Performed by: NEUROLOGICAL SURGERY

## 2021-11-19 PROCEDURE — 72100 X-RAY EXAM L-S SPINE 2/3 VWS: CPT

## 2021-11-19 PROCEDURE — 25010000002 FENTANYL CITRATE (PF) 100 MCG/2ML SOLUTION: Performed by: NURSE ANESTHETIST, CERTIFIED REGISTERED

## 2021-11-19 PROCEDURE — 25010000002 HYDROMORPHONE PER 4 MG: Performed by: ANESTHESIOLOGY

## 2021-11-19 PROCEDURE — 76000 FLUOROSCOPY <1 HR PHYS/QHP: CPT

## 2021-11-19 PROCEDURE — 81025 URINE PREGNANCY TEST: CPT | Performed by: NEUROLOGICAL SURGERY

## 2021-11-19 PROCEDURE — 88311 DECALCIFY TISSUE: CPT | Performed by: NEUROLOGICAL SURGERY

## 2021-11-19 PROCEDURE — 63030 LAMOT DCMPRN NRV RT 1 LMBR: CPT | Performed by: NEUROLOGICAL SURGERY

## 2021-11-19 PROCEDURE — 25010000002 ONDANSETRON PER 1 MG: Performed by: NURSE ANESTHETIST, CERTIFIED REGISTERED

## 2021-11-19 PROCEDURE — C1889 IMPLANT/INSERT DEVICE, NOC: HCPCS | Performed by: NEUROLOGICAL SURGERY

## 2021-11-19 PROCEDURE — 88304 TISSUE EXAM BY PATHOLOGIST: CPT | Performed by: NEUROLOGICAL SURGERY

## 2021-11-19 PROCEDURE — 25010000002 MIDAZOLAM PER 1 MG: Performed by: ANESTHESIOLOGY

## 2021-11-19 PROCEDURE — 25010000002 FENTANYL CITRATE (PF) 50 MCG/ML SOLUTION: Performed by: ANESTHESIOLOGY

## 2021-11-19 PROCEDURE — 25010000002 MORPHINE PER 10 MG: Performed by: NEUROLOGICAL SURGERY

## 2021-11-19 PROCEDURE — 25010000002 DEXAMETHASONE PER 1 MG: Performed by: NURSE ANESTHETIST, CERTIFIED REGISTERED

## 2021-11-19 PROCEDURE — 0 CEFAZOLIN PER 500 MG: Performed by: NEUROLOGICAL SURGERY

## 2021-11-19 PROCEDURE — 25010000002 FENTANYL CITRATE (PF) 250 MCG/5ML SOLUTION: Performed by: NURSE ANESTHETIST, CERTIFIED REGISTERED

## 2021-11-19 PROCEDURE — 25010000002 PROPOFOL 10 MG/ML EMULSION: Performed by: NURSE ANESTHETIST, CERTIFIED REGISTERED

## 2021-11-19 PROCEDURE — 25010000002 METHYLPREDNISOLONE PER 40 MG: Performed by: NEUROLOGICAL SURGERY

## 2021-11-19 DEVICE — HEMOST ABS SURGIFOAM SZ100 8X12 10MM: Type: IMPLANTABLE DEVICE | Site: SPINE LUMBAR | Status: FUNCTIONAL

## 2021-11-19 DEVICE — KT HEMOST ABS SURGIFOAM PORCN 1GRAM: Type: IMPLANTABLE DEVICE | Site: SPINE LUMBAR | Status: FUNCTIONAL

## 2021-11-19 RX ORDER — ONDANSETRON 2 MG/ML
INJECTION INTRAMUSCULAR; INTRAVENOUS AS NEEDED
Status: DISCONTINUED | OUTPATIENT
Start: 2021-11-19 | End: 2021-11-19 | Stop reason: SURG

## 2021-11-19 RX ORDER — LIDOCAINE HYDROCHLORIDE 20 MG/ML
INJECTION, SOLUTION EPIDURAL; INFILTRATION; INTRACAUDAL; PERINEURAL AS NEEDED
Status: DISCONTINUED | OUTPATIENT
Start: 2021-11-19 | End: 2021-11-19 | Stop reason: SURG

## 2021-11-19 RX ORDER — DEXAMETHASONE SODIUM PHOSPHATE 4 MG/ML
INJECTION, SOLUTION INTRA-ARTICULAR; INTRALESIONAL; INTRAMUSCULAR; INTRAVENOUS; SOFT TISSUE AS NEEDED
Status: DISCONTINUED | OUTPATIENT
Start: 2021-11-19 | End: 2021-11-19 | Stop reason: SURG

## 2021-11-19 RX ORDER — LABETALOL HYDROCHLORIDE 5 MG/ML
5 INJECTION, SOLUTION INTRAVENOUS
Status: DISCONTINUED | OUTPATIENT
Start: 2021-11-19 | End: 2021-11-19 | Stop reason: HOSPADM

## 2021-11-19 RX ORDER — OXYCODONE AND ACETAMINOPHEN 10; 325 MG/1; MG/1
1 TABLET ORAL ONCE AS NEEDED
Status: COMPLETED | OUTPATIENT
Start: 2021-11-19 | End: 2021-11-19

## 2021-11-19 RX ORDER — MIDAZOLAM HYDROCHLORIDE 1 MG/ML
1 INJECTION INTRAMUSCULAR; INTRAVENOUS
Status: COMPLETED | OUTPATIENT
Start: 2021-11-19 | End: 2021-11-19

## 2021-11-19 RX ORDER — SODIUM CHLORIDE 9 MG/ML
INJECTION, SOLUTION INTRAVENOUS AS NEEDED
Status: DISCONTINUED | OUTPATIENT
Start: 2021-11-19 | End: 2021-11-19 | Stop reason: HOSPADM

## 2021-11-19 RX ORDER — FENTANYL CITRATE 50 UG/ML
INJECTION, SOLUTION INTRAMUSCULAR; INTRAVENOUS AS NEEDED
Status: DISCONTINUED | OUTPATIENT
Start: 2021-11-19 | End: 2021-11-19 | Stop reason: SURG

## 2021-11-19 RX ORDER — BUPIVACAINE HCL/0.9 % NACL/PF 0.1 %
2 PLASTIC BAG, INJECTION (ML) EPIDURAL ONCE
Status: COMPLETED | OUTPATIENT
Start: 2021-11-19 | End: 2021-11-19

## 2021-11-19 RX ORDER — LIDOCAINE HYDROCHLORIDE 10 MG/ML
0.5 INJECTION, SOLUTION EPIDURAL; INFILTRATION; INTRACAUDAL; PERINEURAL ONCE AS NEEDED
Status: DISCONTINUED | OUTPATIENT
Start: 2021-11-19 | End: 2021-11-19 | Stop reason: HOSPADM

## 2021-11-19 RX ORDER — FENTANYL CITRATE 50 UG/ML
50 INJECTION, SOLUTION INTRAMUSCULAR; INTRAVENOUS ONCE
Status: COMPLETED | OUTPATIENT
Start: 2021-11-19 | End: 2021-11-19

## 2021-11-19 RX ORDER — NALOXONE HCL 0.4 MG/ML
0.04 VIAL (ML) INJECTION AS NEEDED
Status: DISCONTINUED | OUTPATIENT
Start: 2021-11-19 | End: 2021-11-19 | Stop reason: HOSPADM

## 2021-11-19 RX ORDER — SODIUM CHLORIDE 0.9 % (FLUSH) 0.9 %
3 SYRINGE (ML) INJECTION AS NEEDED
Status: DISCONTINUED | OUTPATIENT
Start: 2021-11-19 | End: 2021-11-19 | Stop reason: HOSPADM

## 2021-11-19 RX ORDER — ROCURONIUM BROMIDE 10 MG/ML
INJECTION, SOLUTION INTRAVENOUS AS NEEDED
Status: DISCONTINUED | OUTPATIENT
Start: 2021-11-19 | End: 2021-11-19 | Stop reason: SURG

## 2021-11-19 RX ORDER — ONDANSETRON 2 MG/ML
4 INJECTION INTRAMUSCULAR; INTRAVENOUS AS NEEDED
Status: DISCONTINUED | OUTPATIENT
Start: 2021-11-19 | End: 2021-11-19 | Stop reason: HOSPADM

## 2021-11-19 RX ORDER — FENTANYL CITRATE 50 UG/ML
25 INJECTION, SOLUTION INTRAMUSCULAR; INTRAVENOUS
Status: DISCONTINUED | OUTPATIENT
Start: 2021-11-19 | End: 2021-11-19 | Stop reason: HOSPADM

## 2021-11-19 RX ORDER — HYDROMORPHONE HYDROCHLORIDE 1 MG/ML
0.5 INJECTION, SOLUTION INTRAMUSCULAR; INTRAVENOUS; SUBCUTANEOUS
Status: DISCONTINUED | OUTPATIENT
Start: 2021-11-19 | End: 2021-11-19 | Stop reason: HOSPADM

## 2021-11-19 RX ORDER — NEOSTIGMINE METHYLSULFATE 5 MG/5 ML
SYRINGE (ML) INTRAVENOUS AS NEEDED
Status: DISCONTINUED | OUTPATIENT
Start: 2021-11-19 | End: 2021-11-19 | Stop reason: SURG

## 2021-11-19 RX ORDER — FLUMAZENIL 0.1 MG/ML
0.2 INJECTION INTRAVENOUS AS NEEDED
Status: DISCONTINUED | OUTPATIENT
Start: 2021-11-19 | End: 2021-11-19 | Stop reason: HOSPADM

## 2021-11-19 RX ORDER — HYDROMORPHONE HCL 110MG/55ML
0.5 PATIENT CONTROLLED ANALGESIA SYRINGE INTRAVENOUS ONCE
Status: COMPLETED | OUTPATIENT
Start: 2021-11-19 | End: 2021-11-19

## 2021-11-19 RX ORDER — PROPOFOL 10 MG/ML
VIAL (ML) INTRAVENOUS AS NEEDED
Status: DISCONTINUED | OUTPATIENT
Start: 2021-11-19 | End: 2021-11-19 | Stop reason: SURG

## 2021-11-19 RX ORDER — PHENYLEPHRINE HCL IN 0.9% NACL 1 MG/10 ML
SYRINGE (ML) INTRAVENOUS AS NEEDED
Status: DISCONTINUED | OUTPATIENT
Start: 2021-11-19 | End: 2021-11-19 | Stop reason: SURG

## 2021-11-19 RX ORDER — MAGNESIUM HYDROXIDE 1200 MG/15ML
LIQUID ORAL AS NEEDED
Status: DISCONTINUED | OUTPATIENT
Start: 2021-11-19 | End: 2021-11-19 | Stop reason: HOSPADM

## 2021-11-19 RX ORDER — SODIUM CHLORIDE 0.9 % (FLUSH) 0.9 %
3 SYRINGE (ML) INJECTION EVERY 12 HOURS SCHEDULED
Status: DISCONTINUED | OUTPATIENT
Start: 2021-11-19 | End: 2021-11-19 | Stop reason: HOSPADM

## 2021-11-19 RX ORDER — SCOLOPAMINE TRANSDERMAL SYSTEM 1 MG/1
1 PATCH, EXTENDED RELEASE TRANSDERMAL CONTINUOUS
Status: DISCONTINUED | OUTPATIENT
Start: 2021-11-19 | End: 2021-11-19 | Stop reason: HOSPADM

## 2021-11-19 RX ORDER — SODIUM CHLORIDE, SODIUM LACTATE, POTASSIUM CHLORIDE, CALCIUM CHLORIDE 600; 310; 30; 20 MG/100ML; MG/100ML; MG/100ML; MG/100ML
1000 INJECTION, SOLUTION INTRAVENOUS CONTINUOUS
Status: DISCONTINUED | OUTPATIENT
Start: 2021-11-19 | End: 2021-11-19 | Stop reason: HOSPADM

## 2021-11-19 RX ORDER — ACETAMINOPHEN 500 MG
1000 TABLET ORAL ONCE
Status: COMPLETED | OUTPATIENT
Start: 2021-11-19 | End: 2021-11-19

## 2021-11-19 RX ORDER — SODIUM CHLORIDE 0.9 % (FLUSH) 0.9 %
3-10 SYRINGE (ML) INJECTION AS NEEDED
Status: DISCONTINUED | OUTPATIENT
Start: 2021-11-19 | End: 2021-11-19 | Stop reason: HOSPADM

## 2021-11-19 RX ORDER — SODIUM CHLORIDE, SODIUM LACTATE, POTASSIUM CHLORIDE, CALCIUM CHLORIDE 600; 310; 30; 20 MG/100ML; MG/100ML; MG/100ML; MG/100ML
100 INJECTION, SOLUTION INTRAVENOUS CONTINUOUS
Status: DISCONTINUED | OUTPATIENT
Start: 2021-11-19 | End: 2021-11-19 | Stop reason: HOSPADM

## 2021-11-19 RX ADMIN — Medication 50 MCG: at 11:39

## 2021-11-19 RX ADMIN — SODIUM CHLORIDE, POTASSIUM CHLORIDE, SODIUM LACTATE AND CALCIUM CHLORIDE 1000 ML: 600; 310; 30; 20 INJECTION, SOLUTION INTRAVENOUS at 08:54

## 2021-11-19 RX ADMIN — FENTANYL CITRATE 100 MCG: 50 INJECTION, SOLUTION INTRAMUSCULAR; INTRAVENOUS at 10:38

## 2021-11-19 RX ADMIN — DEXAMETHASONE SODIUM PHOSPHATE 8 MG: 4 INJECTION, SOLUTION INTRA-ARTICULAR; INTRALESIONAL; INTRAMUSCULAR; INTRAVENOUS; SOFT TISSUE at 10:57

## 2021-11-19 RX ADMIN — Medication 200 MCG: at 10:30

## 2021-11-19 RX ADMIN — SCOPALAMINE 1 PATCH: 1 PATCH, EXTENDED RELEASE TRANSDERMAL at 09:17

## 2021-11-19 RX ADMIN — Medication 100 MCG: at 10:25

## 2021-11-19 RX ADMIN — Medication 3 MG: at 11:57

## 2021-11-19 RX ADMIN — OXYCODONE AND ACETAMINOPHEN 1 TABLET: 325; 10 TABLET ORAL at 12:34

## 2021-11-19 RX ADMIN — ACETAMINOPHEN 1000 MG: 500 TABLET, FILM COATED ORAL at 09:18

## 2021-11-19 RX ADMIN — PROPOFOL 120 MG: 10 INJECTION, EMULSION INTRAVENOUS at 10:12

## 2021-11-19 RX ADMIN — MIDAZOLAM 1 MG: 1 INJECTION INTRAMUSCULAR; INTRAVENOUS at 09:33

## 2021-11-19 RX ADMIN — FENTANYL CITRATE 100 MCG: 50 INJECTION, SOLUTION INTRAMUSCULAR; INTRAVENOUS at 10:59

## 2021-11-19 RX ADMIN — ONDANSETRON 4 MG: 2 INJECTION INTRAMUSCULAR; INTRAVENOUS at 11:11

## 2021-11-19 RX ADMIN — GLYCOPYRROLATE 0.4 MG: 0.2 INJECTION, SOLUTION INTRAMUSCULAR; INTRAVENOUS at 11:57

## 2021-11-19 RX ADMIN — ROCURONIUM BROMIDE 20 MG: 10 INJECTION INTRAVENOUS at 10:12

## 2021-11-19 RX ADMIN — Medication 2 G: at 10:20

## 2021-11-19 RX ADMIN — HYDROMORPHONE HYDROCHLORIDE 0.5 MG: 2 INJECTION INTRAMUSCULAR; INTRAVENOUS; SUBCUTANEOUS at 13:43

## 2021-11-19 RX ADMIN — MIDAZOLAM 1 MG: 1 INJECTION INTRAMUSCULAR; INTRAVENOUS at 09:23

## 2021-11-19 RX ADMIN — LIDOCAINE HYDROCHLORIDE 100 MG: 20 INJECTION, SOLUTION EPIDURAL; INFILTRATION; INTRACAUDAL; PERINEURAL at 10:12

## 2021-11-19 RX ADMIN — FENTANYL CITRATE 50 MCG: 50 INJECTION INTRAMUSCULAR; INTRAVENOUS at 09:23

## 2021-11-19 RX ADMIN — FENTANYL CITRATE 100 MCG: 50 INJECTION, SOLUTION INTRAMUSCULAR; INTRAVENOUS at 10:10

## 2021-11-19 RX ADMIN — FENTANYL CITRATE 50 MCG: 50 INJECTION, SOLUTION INTRAMUSCULAR; INTRAVENOUS at 12:04

## 2021-11-19 NOTE — DISCHARGE INSTRUCTIONS

## 2021-11-19 NOTE — ANESTHESIA PROCEDURE NOTES
Airway  Urgency: elective    Date/Time: 11/19/2021 10:15 AM  Airway not difficult    General Information and Staff    Patient location during procedure: OR  CRNA: Erin Jason CRNA    Indications and Patient Condition  Indications for airway management: airway protection    Preoxygenated: yes  Mask difficulty assessment: 1 - vent by mask    Final Airway Details  Final airway type: endotracheal airway      Successful airway: ETT  Cuffed: yes   Successful intubation technique: direct laryngoscopy  Facilitating devices/methods: intubating stylet  Endotracheal tube insertion site: oral  Blade: Checo  Blade size: 3.5.  ETT size (mm): 7.0  Cormack-Lehane Classification: grade I - full view of glottis  Placement verified by: chest auscultation and capnometry   Cuff volume (mL): 5  Measured from: teeth  ETT/EBT  to teeth (cm): 21  Number of attempts at approach: 1  Assessment: lips, teeth, and gum same as pre-op and atraumatic intubation

## 2021-11-19 NOTE — OP NOTE
Procedure Note  Preop Diagnosis: Lumbar disc herniation [M51.26]    Post-Op Diagnosis Codes:     * Lumbar disc herniation [M51.26]     Procedure Name: right L4-5 hemilaminectomy  rightL4-5 foraminotomy  right L4-5 discectomy  Use of the operative microscope    Indications:  A MRI of the lumbar spine revealed findings of disc herniation at L4-5. The patient now presents for decompression after discussing therapeutic alternatives.          Surgeon: Bony Orellana MD     Assistants: none    Anesthesia: General endotracheal anesthesia    ASA Class: 3    Procedure Details   After obtaining informed consent, having the risks and benefits of the procedure explained including but not limited to infection, bleeding, paralysis, spinal fluid leak, bowel and/or bladder incontinence, stroke, coma, and death, the patient was brought to the operating room.  The patient was given general anesthesia via an endotracheal tube.  The patient was flipped prone onto a Tariq axis table.  Portable fluoroscopy was used to localize level of L4-5.  A preplanned midline incision was marked with an indelible marker.  The patient was then prepped and draped in a standard sterile fashion.  The preplanned incision was infiltrated with Marcaine and epinephrine.  A 10 blade scalpel was used to make an incision through the dermis and epidermis.  Bovie cautery was used to extend the incision down to the subcutaneous and soft tissues to the level of the spinous process.  The musculature and connective tissue were then dissected off the spinous processes of L4 to the right using Bovie cautery.  A [place]y instrument was placed under the lamina.  Fluoroscopy confirmed this was the correct level.  The Liz retractor system was then placed into the wound.  At this point the operative microscope was brought in.  A 3 mm round cutting bur was used to drill a small hemilaminectomy on the right at L4.  The laminectomy was then widened using a  combination of 2 mm and 3 mm Kerrisons.  The ligamentum flavum was then bluntly dissected using a Penfield 4.  The ligament was then removed using 2 mm Kerrisons.  The nerve root and thecal sac were then retracted medially exposing the bulging disc.  The annulus of the disc was then incised using a bayoneted 15 blade scalpel.  The disc bulge was then reduced using a combination of pituitary rongeurs and 2 mm Kerrisons.  several large free fragments of disc were removed using the pituitary rongure. The lateral recesses were then decompressed using a combination of 2 mm and 3 mm Kerrisons and foraminotomies at this level were then conducted off the left using a combination of 2 mm and 3 mm Kerrisons.  When we were satisfied that we had adequately decompressed all the neural structures the wound was copiously irrigated with an antibiotic solution.  It was inspected for hemostasis.  A small piece of Gelfoam soaked in Duramorph and Solu-Medrol was then placed over the exiting nerve root.  The deep tissues were then closed using a series of inverted interrupted 0 Vicryl sutures.  The subcutaneous and soft tissues were closed using a series of inverted interrupted 2-0 Vicryl sutures.  The skin was closed using a running 4-0 Monocryl subcuticular.  All sponge, needle and instrument counts were correct at the end of the procedure.  The patient was extubated in stable condition and returned to the recovery room with about 50 mL of blood loss.    Findings:  L4-5 right disc herniation    Estimated Blood Loss:  50           Drains: None           Total IV Fluids: mL           Specimens:            Implants:   Implant Name Type Inv. Item Serial No.  Lot No. LRB No. Used Action   HEMOST ABS SURGIFOAM  8X12 10MM - [id number] Implant HEMOST ABS SURGIFOAM  8X12 10MM  [employer] [id number] Right 1 Implanted   KT HEMOST ABS SURGIFOAM PORCN 1GRAM - [id number] Implant KT HEMOST ABS SURGIFOAM PORCN 1GRAM   [employer]  [employer] [id number] Right 1 Implanted              Complications:  none           Disposition: PACU - hemodynamically stable.           Condition: Stable        Bony Orellana MD

## 2021-11-19 NOTE — ANESTHESIA PREPROCEDURE EVALUATION
Anesthesia Evaluation     Patient summary reviewed   NPO Solid Status: > 6 hours  NPO Liquid Status: > 6 hours           Airway   Mallampati: II  TM distance: >3 FB  Dental      Pulmonary    (+) a smoker Current,   Cardiovascular   Exercise tolerance: good (4-7 METS)        Neuro/Psych  (-) seizures, CVA  GI/Hepatic/Renal/Endo    (+) obesity,     (-) liver disease, no renal disease, diabetes    Musculoskeletal     Abdominal    Substance History      OB/GYN          Other                        Anesthesia Plan    ASA 2     general     intravenous induction     Anesthetic plan, all risks, benefits, and alternatives have been provided, discussed and informed consent has been obtained with: patient.

## 2021-11-21 ENCOUNTER — APPOINTMENT (OUTPATIENT)
Dept: GENERAL RADIOLOGY | Facility: HOSPITAL | Age: 21
End: 2021-11-21

## 2021-11-21 ENCOUNTER — HOSPITAL ENCOUNTER (EMERGENCY)
Facility: HOSPITAL | Age: 21
Discharge: HOME | End: 2021-11-21
Attending: FAMILY MEDICINE | Admitting: FAMILY MEDICINE

## 2021-11-21 VITALS
BODY MASS INDEX: 31.58 KG/M2 | RESPIRATION RATE: 16 BRPM | SYSTOLIC BLOOD PRESSURE: 105 MMHG | WEIGHT: 185 LBS | OXYGEN SATURATION: 99 % | HEIGHT: 64 IN | DIASTOLIC BLOOD PRESSURE: 59 MMHG | TEMPERATURE: 97.9 F | HEART RATE: 72 BPM

## 2021-11-21 DIAGNOSIS — K59.03 DRUG-INDUCED CONSTIPATION: Primary | ICD-10-CM

## 2021-11-21 LAB
ALBUMIN SERPL-MCNC: 4.8 G/DL (ref 3.5–5.2)
ALBUMIN/GLOB SERPL: 1.8 G/DL
ALP SERPL-CCNC: 85 U/L (ref 39–117)
ALT SERPL W P-5'-P-CCNC: 25 U/L (ref 1–33)
ANION GAP SERPL CALCULATED.3IONS-SCNC: 7 MMOL/L (ref 5–15)
AST SERPL-CCNC: 16 U/L (ref 1–32)
B-HCG UR QL: NEGATIVE
BACTERIA UR QL AUTO: ABNORMAL /HPF
BASOPHILS # BLD AUTO: 0.07 10*3/MM3 (ref 0–0.2)
BASOPHILS NFR BLD AUTO: 0.6 % (ref 0–1.5)
BILIRUB SERPL-MCNC: 0.3 MG/DL (ref 0–1.2)
BILIRUB UR QL STRIP: NEGATIVE
BUN SERPL-MCNC: 12 MG/DL (ref 6–20)
BUN/CREAT SERPL: 16.7 (ref 7–25)
CALCIUM SPEC-SCNC: 9.4 MG/DL (ref 8.6–10.5)
CHLORIDE SERPL-SCNC: 102 MMOL/L (ref 98–107)
CK SERPL-CCNC: 75 U/L (ref 20–180)
CLARITY UR: CLEAR
CO2 SERPL-SCNC: 29 MMOL/L (ref 22–29)
COLOR UR: YELLOW
CREAT SERPL-MCNC: 0.72 MG/DL (ref 0.57–1)
DEPRECATED RDW RBC AUTO: 47.7 FL (ref 37–54)
EOSINOPHIL # BLD AUTO: 0.16 10*3/MM3 (ref 0–0.4)
EOSINOPHIL NFR BLD AUTO: 1.3 % (ref 0.3–6.2)
ERYTHROCYTE [DISTWIDTH] IN BLOOD BY AUTOMATED COUNT: 15.7 % (ref 12.3–15.4)
GFR SERPL CREATININE-BSD FRML MDRD: 103 ML/MIN/1.73
GLOBULIN UR ELPH-MCNC: 2.7 GM/DL
GLUCOSE SERPL-MCNC: 67 MG/DL (ref 65–99)
GLUCOSE UR STRIP-MCNC: NEGATIVE MG/DL
HCT VFR BLD AUTO: 42.1 % (ref 34–46.6)
HGB BLD-MCNC: 13.4 G/DL (ref 12–15.9)
HGB UR QL STRIP.AUTO: ABNORMAL
HOLD SPECIMEN: NORMAL
HOLD SPECIMEN: NORMAL
HYALINE CASTS UR QL AUTO: ABNORMAL /LPF
IMM GRANULOCYTES # BLD AUTO: 0.05 10*3/MM3 (ref 0–0.05)
IMM GRANULOCYTES NFR BLD AUTO: 0.4 % (ref 0–0.5)
KETONES UR QL STRIP: ABNORMAL
LEUKOCYTE ESTERASE UR QL STRIP.AUTO: NEGATIVE
LYMPHOCYTES # BLD AUTO: 3.73 10*3/MM3 (ref 0.7–3.1)
LYMPHOCYTES NFR BLD AUTO: 30.8 % (ref 19.6–45.3)
MAGNESIUM SERPL-MCNC: 1.7 MG/DL (ref 1.7–2.2)
MCH RBC QN AUTO: 26.7 PG (ref 26.6–33)
MCHC RBC AUTO-ENTMCNC: 31.8 G/DL (ref 31.5–35.7)
MCV RBC AUTO: 83.9 FL (ref 79–97)
MONOCYTES # BLD AUTO: 0.98 10*3/MM3 (ref 0.1–0.9)
MONOCYTES NFR BLD AUTO: 8.1 % (ref 5–12)
NEUTROPHILS NFR BLD AUTO: 58.8 % (ref 42.7–76)
NEUTROPHILS NFR BLD AUTO: 7.11 10*3/MM3 (ref 1.7–7)
NITRITE UR QL STRIP: NEGATIVE
NRBC BLD AUTO-RTO: 0 /100 WBC (ref 0–0.2)
PH UR STRIP.AUTO: 6 [PH] (ref 5–9)
PLATELET # BLD AUTO: 311 10*3/MM3 (ref 140–450)
PMV BLD AUTO: 9.4 FL (ref 6–12)
POTASSIUM SERPL-SCNC: 3.9 MMOL/L (ref 3.5–5.2)
PROT SERPL-MCNC: 7.5 G/DL (ref 6–8.5)
PROT UR QL STRIP: NEGATIVE
RBC # BLD AUTO: 5.02 10*6/MM3 (ref 3.77–5.28)
RBC # UR STRIP: ABNORMAL /HPF
REF LAB TEST METHOD: ABNORMAL
SODIUM SERPL-SCNC: 138 MMOL/L (ref 136–145)
SP GR UR STRIP: 1.02 (ref 1–1.03)
SQUAMOUS #/AREA URNS HPF: ABNORMAL /HPF
UROBILINOGEN UR QL STRIP: ABNORMAL
WBC # UR STRIP: ABNORMAL /HPF
WBC NRBC COR # BLD: 12.1 10*3/MM3 (ref 3.4–10.8)
WHOLE BLOOD HOLD SPECIMEN: NORMAL

## 2021-11-21 PROCEDURE — 85025 COMPLETE CBC W/AUTO DIFF WBC: CPT | Performed by: FAMILY MEDICINE

## 2021-11-21 PROCEDURE — 80053 COMPREHEN METABOLIC PANEL: CPT | Performed by: FAMILY MEDICINE

## 2021-11-21 PROCEDURE — 99283 EMERGENCY DEPT VISIT LOW MDM: CPT

## 2021-11-21 PROCEDURE — 25010000002 KETOROLAC TROMETHAMINE PER 15 MG: Performed by: FAMILY MEDICINE

## 2021-11-21 PROCEDURE — 74019 RADEX ABDOMEN 2 VIEWS: CPT

## 2021-11-21 PROCEDURE — 83735 ASSAY OF MAGNESIUM: CPT | Performed by: FAMILY MEDICINE

## 2021-11-21 PROCEDURE — 82550 ASSAY OF CK (CPK): CPT | Performed by: FAMILY MEDICINE

## 2021-11-21 PROCEDURE — 96374 THER/PROPH/DIAG INJ IV PUSH: CPT

## 2021-11-21 PROCEDURE — 81001 URINALYSIS AUTO W/SCOPE: CPT | Performed by: FAMILY MEDICINE

## 2021-11-21 PROCEDURE — 81025 URINE PREGNANCY TEST: CPT | Performed by: FAMILY MEDICINE

## 2021-11-21 RX ORDER — POLYETHYLENE GLYCOL 3350 17 G/17G
17 POWDER, FOR SOLUTION ORAL DAILY
Qty: 7 PACKET | Refills: 0 | Status: SHIPPED | OUTPATIENT
Start: 2021-11-21 | End: 2021-11-28

## 2021-11-21 RX ORDER — KETOROLAC TROMETHAMINE 15 MG/ML
15 INJECTION, SOLUTION INTRAMUSCULAR; INTRAVENOUS ONCE
Status: COMPLETED | OUTPATIENT
Start: 2021-11-21 | End: 2021-11-21

## 2021-11-21 RX ADMIN — KETOROLAC TROMETHAMINE 15 MG: 15 INJECTION, SOLUTION INTRAMUSCULAR; INTRAVENOUS at 17:27

## 2021-11-21 RX ADMIN — SODIUM CHLORIDE 1000 ML: 9 INJECTION, SOLUTION INTRAVENOUS at 17:28

## 2021-11-21 NOTE — ED PROVIDER NOTES
"Subjective   Patient presents emergency department with a chief complaint of constipation.  She states that she has not had a bowel movement for 2 weeks.  She had back surgery performed 2 days ago at [facility].  Patient does state that she has a history of chronic constipation, which is now worse.  She is on pain medications and states that she has been taking Lortabs for one month.  Stool softeners, MiraLAX, and a \"suppository\" have not helped.        Constipation  Severity:  Moderate  Time since last bowel movement:  2 days  Chronicity:  Recurrent  Stool description:  None produced  Relieved by:  Nothing  Associated symptoms: back pain    Associated symptoms: no abdominal pain, no diarrhea, no dysuria, no fever, no nausea and no vomiting    Risk factors: recent surgery        Review of Systems   Constitutional: Positive for activity change. Negative for appetite change, chills, diaphoresis, fatigue and fever.   HENT: Negative for congestion, ear discharge, ear pain, nosebleeds, rhinorrhea, sinus pressure, sore throat and trouble swallowing.    Eyes: Negative for discharge and redness.   Respiratory: Negative for apnea, cough, chest tightness, shortness of breath and wheezing.    Cardiovascular: Negative for chest pain.   Gastrointestinal: Positive for constipation. Negative for abdominal pain, diarrhea, nausea and vomiting.   Endocrine: Negative for polyuria.   Genitourinary: Negative for dysuria, frequency and urgency.   Musculoskeletal: Positive for back pain. Negative for myalgias and neck pain.   Skin: Negative for color change and rash.   Allergic/Immunologic: Negative for immunocompromised state.   Neurological: Negative for dizziness, seizures, syncope, weakness, light-headedness and headaches.   Hematological: Negative for adenopathy. Does not bruise/bleed easily.   Psychiatric/Behavioral: Negative for behavioral problems and confusion.   All other systems reviewed and are negative.      Past " Medical History:   Diagnosis Date   • Allergic    • Anxiety    • Lumbar radiculopathy 11/15/2021   • Obesity    • Postural hypotension    • Urinary tract infection        No Known Allergies    History reviewed. No pertinent surgical history.    Family History   Problem Relation Age of Onset   • Heart attack Father    • Heart disease Father    • Diabetes Mother    • Stroke Mother    • Schizophrenia Brother    • No Known Problems Son    • Hepatitis Maternal Grandmother    • Schizophrenia Maternal Grandmother    • Diabetes Paternal Grandmother    • Heart disease Paternal Grandfather    • Heart attack Paternal Grandfather    • Diabetes Paternal Grandfather    • No Known Problems Brother        Social History     Socioeconomic History   • Marital status:    • Highest education level: High school graduate   Tobacco Use   • Smoking status: Current Every Day Smoker     Packs/day: 0.50     Types: Cigarettes   • Smokeless tobacco: Never Used   Vaping Use   • Vaping Use: Some days   • Substances: Flavoring   • Devices: Disposable, Pre-filled pod   Substance and Sexual Activity   • Alcohol use: Never   • Drug use: Not Currently     Types: Marijuana     Comment: former   • Sexual activity: Defer     Partners: Male           Objective   Physical Exam  Vitals and nursing note reviewed.   Constitutional:       Appearance: She is well-developed.   HENT:      Head: Normocephalic and atraumatic.      Nose: Nose normal.   Eyes:      General: No scleral icterus.        Right eye: No discharge.         Left eye: No discharge.      Conjunctiva/sclera: Conjunctivae normal.      Pupils: Pupils are equal, round, and reactive to light.   Neck:      Trachea: No tracheal deviation.   Cardiovascular:      Rate and Rhythm: Normal rate and regular rhythm.      Heart sounds: Normal heart sounds. No murmur heard.      Pulmonary:      Effort: Pulmonary effort is normal. No respiratory distress.      Breath sounds: Normal breath sounds. No  stridor. No wheezing or rales.   Abdominal:      General: Bowel sounds are normal. There is no distension.      Palpations: Abdomen is soft. There is no mass.      Tenderness: There is no abdominal tenderness. There is no guarding or rebound.   Musculoskeletal:      Cervical back: Normal range of motion and neck supple.   Skin:     General: Skin is warm and dry.      Findings: No erythema or rash.   Neurological:      Mental Status: She is alert and oriented to person, place, and time.      Coordination: Coordination normal.   Psychiatric:         Behavior: Behavior normal.         Thought Content: Thought content normal.         Procedures           ED Course          Request # : [id number]          Labs Reviewed   CBC WITH AUTO DIFFERENTIAL - Abnormal; Notable for the following components:       Result Value    WBC 12.10 (*)     RDW 15.7 (*)     Neutrophils, Absolute 7.11 (*)     Lymphocytes, Absolute 3.73 (*)     Monocytes, Absolute 0.98 (*)     All other components within normal limits   URINALYSIS W/ MICROSCOPIC IF INDICATED (NO CULTURE) - Abnormal; Notable for the following components:    Ketones, UA 15 mg/dL (1+) (*)     Blood, UA Trace (*)     All other components within normal limits   URINALYSIS, MICROSCOPIC ONLY - Abnormal; Notable for the following components:    RBC, UA 3-5 (*)     Squamous Epithelial Cells, UA 13-20 (*)     All other components within normal limits   PREGNANCY, URINE - Normal   CK - Normal   MAGNESIUM - Normal   COMPREHENSIVE METABOLIC PANEL    Narrative:     GFR Normal >60  Chronic Kidney Disease <60  Kidney Failure <15     RAINBOW DRAW    Narrative:     The following orders were created for panel order [place] Draw.  Procedure                               Abnormality         Status                     ---------                               -----------         ------                     Green Top (Gel)[[id number]]                                  Final result               Lavender  Top[[id number]]                                     Final result               Gold Top - SST[[id number]]                                   Final result               Light Blue Top[[id number]]                                                                Please view results for these tests on the individual orders.   CBC WITH AUTO DIFFERENTIAL   CBC AND DIFFERENTIAL    Narrative:     The following orders were created for panel order CBC & Differential.  Procedure                               Abnormality         Status                     ---------                               -----------         ------                     CBC Auto Differential[[id number]]        Abnormal            Final result                 Please view results for these tests on the individual orders.   GREEN TOP   LAVENDER TOP   GOLD TOP - SST   LIGHT BLUE TOP   CBC AND DIFFERENTIAL    Narrative:     The following orders were created for panel order CBC & Differential.  Procedure                               Abnormality         Status                     ---------                               -----------         ------                     CBC Auto Differential[[id number]]                                                         Please view results for these tests on the individual orders.       XR Abdomen Flat & Upright   Final Result   Moderate stool content in the colon.   Nonspecific small bowel gas pattern.             Electronically signed by:  Ilan Turner MD  11/21/2021 4:58 PM [facility]   Workstation: [id number]                                       [facility]    Final diagnoses:   Drug-induced constipation       ED Disposition  ED Disposition     ED Disposition Condition Comment    Discharge Stable           Ramon Vargas, APRN  [address] [place] [facility] [address] [phone]    In 2 days           Medication List      New Prescriptions    linaclotide 145 MCG capsule capsule  Commonly known as: Linzess  Take 1 capsule by mouth Every  Morning Before Breakfast for 7 days.     polyethylene glycol 17 g packet  Commonly known as: MIRALAX  Take 17 g by mouth Daily for 7 days.           Where to Get Your Medications      These medications were sent to [facility] - [place], [place] - [address] [facility] - [phone]  - [phone] [facility] [facility] [address]    Phone: [phone]   · linaclotide 145 MCG capsule capsule  · polyethylene glycol 17 g packet          Lester Gonzalez MD  11/21/21 1948       Lester Gonzalez MD  11/21/21 1950       Lester Gonzalez MD  11/21/21 1958

## 2021-11-21 NOTE — ED TRIAGE NOTES
Pt states she has not had a BM for approx 9 days. Had lumbar discectomy on 11/19 and has been on a lot of pain medication. Complains of mid upper abd pain and nausea

## 2021-11-21 NOTE — ANESTHESIA POSTPROCEDURE EVALUATION
Patient: Moriah Nicole    Procedure Summary     Date: 11/19/21 Room / Location:  [facility] OR  /  [facility] OR    Anesthesia Start: 1010 Anesthesia Stop: 1215    Procedure: LUMBAR DISCECTOMY L4-5 RIGHT (Right Spine Lumbar) Diagnosis:       Lumbar disc herniation      (Lumbar disc herniation [M51.26])    Surgeons: Bony Orellana MD Provider: Erin Jason CRNA    Anesthesia Type: general ASA Status: 2          Anesthesia Type: general    Vitals  Vitals Value Taken Time   /70 11/19/21 1235   Temp 97.3 °F (36.3 °C) 11/19/21 1235   Pulse 69 11/19/21 1240   Resp 13 11/19/21 1235   SpO2 97 % 11/19/21 1240   Vitals shown include unvalidated device data.        Post Anesthesia Care and Evaluation    Patient location during evaluation: PACU  Patient participation: complete - patient participated  Level of consciousness: awake  Pain management: adequate  Airway patency: patent  Anesthetic complications: No anesthetic complications  PONV Status: none  Cardiovascular status: acceptable  Respiratory status: acceptable  Hydration status: acceptable    Comments: /82   Pulse 88   Temp 97.3 °F (36.3 °C) (Temporal)   Resp 16   LMP 11/04/2021 (Exact Date)   SpO2 99%   Breastfeeding No

## 2021-11-26 LAB
CYTO UR: NORMAL
LAB AP CASE REPORT: NORMAL
PATH REPORT.FINAL DX SPEC: NORMAL
PATH REPORT.GROSS SPEC: NORMAL

## 2021-12-02 NOTE — PATIENT INSTRUCTIONS
"BMI for Adults  What is BMI?  Body mass index (BMI) is a number that is calculated from a person's weight and height. BMI can help estimate how much of a person's weight is composed of fat. BMI does not measure body fat directly. Rather, it is an alternative to procedures that directly measure body fat, which can be difficult and expensive.  BMI can help identify people who may be at higher risk for certain medical problems.  What are BMI measurements used for?  BMI is used as a screening tool to identify possible weight problems. It helps determine whether a person is obese, overweight, a healthy weight, or underweight.  BMI is useful for:  · Identifying a weight problem that may be related to a medical condition or may increase the risk for medical problems.  · Promoting changes, such as changes in diet and exercise, to help reach a healthy weight. BMI screening can be repeated to see if these changes are working.  How is BMI calculated?  BMI involves measuring your weight in relation to your height. Both height and weight are measured, and the BMI is calculated from those numbers. This can be done either in English ([place] or metric measurements. Note that charts and online BMI calculators are available to help you find your BMI quickly and easily without having to do these calculations yourself.  To calculate your BMI in English ([place] measurements:    1. Measure your weight in pounds (lb).  2. Multiply the number of pounds by 703.  ? For example, for a person who weighs 180 lb, multiply that number by 703, which equals 126,540.  3. Measure your height in inches. Then multiply that number by itself to get a measurement called \"inches squared.\"  ? For example, for a person who is 70 inches tall, the \"inches squared\" measurement is 70 inches x 70 inches, which equals 4,900 inches squared.  4. Divide the total from step 2 (number of lb x 703) by the total from step 3 (inches squared): 126,540 ÷ 4,900 = 25.8. This is " appt scheduled with Salas Malloy Pt needed a appt before 1/1/22 "your BMI.  To calculate your BMI in metric measurements:  1. Measure your weight in kilograms (kg).  2. Measure your height in meters (m). Then multiply that number by itself to get a measurement called \"meters squared.\"  ? For example, for a person who is 1.75 m tall, the \"meters squared\" measurement is 1.75 m x 1.75 m, which is equal to 3.1 meters squared.  3. Divide the number of kilograms (your weight) by the meters squared number. In this example: 70 ÷ 3.1 = 22.6. This is your BMI.  What do the results mean?  BMI charts are used to identify whether you are underweight, normal weight, overweight, or obese. The following guidelines will be used:  · Underweight: BMI less than 18.5.  · Normal weight: BMI between 18.5 and 24.9.  · Overweight: BMI between 25 and 29.9.  · Obese: BMI of 30 or above.  Keep these notes in mind:  · Weight includes both fat and muscle, so someone with a muscular build, such as an athlete, may have a BMI that is higher than 24.9. In cases like these, BMI is not an accurate measure of body fat.  · To determine if excess body fat is the cause of a BMI of 25 or higher, further assessments may need to be done by a health care provider.  · BMI is usually interpreted in the same way for men and women.  Where to find more information  For more information about BMI, including tools to quickly calculate your BMI, go to these websites:  · Centers for Disease Control and Prevention: www.cdc.gov  · American Heart Association: www.heart.org  · National Heart, Lung, and Blood [place]: www.nhlbi.nih.gov  Summary  · Body mass index (BMI) is a number that is calculated from a person's weight and height.  · BMI may help estimate how much of a person's weight is composed of fat. BMI can help identify those who may be at higher risk for certain medical problems.  · BMI can be measured using English measurements or metric measurements.  · BMI charts are used to identify whether you are underweight, normal " "weight, overweight, or obese.  This information is not intended to replace advice given to you by your health care provider. Make sure you discuss any questions you have with your health care provider.  Document Revised: 09/09/2020 Document Reviewed: 07/17/2020  [employer] Patient Education © 2021 [employer]      https://www.nhlbi.nih.gov/files/docs/public/heart/dash_brief.pdf\">   DASH Eating Plan  DASH stands for Dietary Approaches to Stop Hypertension. The DASH eating plan is a healthy eating plan that has been shown to:  · Reduce high blood pressure (hypertension).  · Reduce your risk for type 2 diabetes, heart disease, and stroke.  · Help with weight loss.  What are tips for following this plan?  Reading food labels  · Check food labels for the amount of salt (sodium) per serving. Choose foods with less than 5 percent of the Daily Value of sodium. Generally, foods with less than 300 milligrams (mg) of sodium per serving fit into this eating plan.  · To find whole grains, look for the word \"whole\" as the first word in the ingredient list.  Shopping  · Buy products labeled as \"low-sodium\" or \"no salt added.\"  · Buy fresh foods. Avoid canned foods and pre-made or frozen meals.  Cooking  · Avoid adding salt when cooking. Use salt-free seasonings or herbs instead of table salt or sea salt. Check with your health care provider or pharmacist before using salt substitutes.  · Do not ely foods. Cook foods using healthy methods such as baking, boiling, grilling, roasting, and broiling instead.  · Cook with heart-healthy oils, such as olive, canola, avocado, soybean, or sunflower oil.  Meal planning    · Eat a balanced diet that includes:  ? 4 or more servings of fruits and 4 or more servings of vegetables each day. Try to fill one-half of your plate with fruits and vegetables.  ? 6-8 servings of whole grains each day.  ? Less than 6 oz (170 g) of lean meat, poultry, or fish each day. A 3-oz (85-g) serving of meat is " about the same size as a deck of cards. One egg equals 1 oz (28 g).  ? 2-3 servings of low-fat dairy each day. One serving is 1 cup (237 mL).  ? 1 serving of nuts, seeds, or beans 5 times each week.  ? 2-3 servings of heart-healthy fats. Healthy fats called omega-3 fatty acids are found in foods such as walnuts, flaxseeds, fortified milks, and eggs. These fats are also found in cold-water fish, such as sardines, salmon, and mackerel.  · Limit how much you eat of:  ? Canned or prepackaged foods.  ? Food that is high in trans fat, such as some fried foods.  ? Food that is high in saturated fat, such as fatty meat.  ? Desserts and other sweets, sugary drinks, and other foods with added sugar.  ? Full-fat dairy products.  · Do not salt foods before eating.  · Do not eat more than 4 egg yolks a week.  · Try to eat at least 2 vegetarian meals a week.  · Eat more home-cooked food and less restaurant, buffet, and fast food.  Lifestyle  · When eating at a restaurant, ask that your food be prepared with less salt or no salt, if possible.  · If you drink alcohol:  ? Limit how much you use to:  § 0-1 drink a day for women who are not pregnant.  § 0-2 drinks a day for men.  ? Be aware of how much alcohol is in your drink. In the [place] one drink equals one 12 oz bottle of beer (355 mL), one 5 oz glass of wine (148 mL), or one 1½ oz glass of hard liquor (44 mL).  General information  · Avoid eating more than 2,300 mg of salt a day. If you have hypertension, you may need to reduce your sodium intake to 1,500 mg a day.  · Work with your health care provider to maintain a healthy body weight or to lose weight. Ask what an ideal weight is for you.  · Get at least 30 minutes of exercise that causes your heart to beat faster (aerobic exercise) most days of the week. Activities may include walking, swimming, or biking.  · Work with your health care provider or dietitian to adjust your eating plan to your individual calorie needs.  What  foods should I eat?  Fruits  All fresh, dried, or frozen fruit. Canned fruit in natural juice (without added sugar).  Vegetables  Fresh or frozen vegetables (raw, steamed, roasted, or grilled). Low-sodium or reduced-sodium tomato and vegetable juice. Low-sodium or reduced-sodium tomato sauce and tomato paste. Low-sodium or reduced-sodium canned vegetables.  Grains  Whole-grain or whole-wheat bread. Whole-grain or whole-wheat pasta. Brown rice. Oatmeal. Quinoa. Bulgur. Whole-grain and low-sodium cereals. Gifty bread. Low-fat, low-sodium crackers. Whole-wheat flour tortillas.  Meats and other proteins  Skinless chicken or turkey. Ground chicken or turkey. Pork with fat trimmed off. Fish and seafood. Egg whites. Dried beans, peas, or lentils. Unsalted nuts, nut butters, and seeds. Unsalted canned beans. Lean cuts of beef with fat trimmed off. Low-sodium, lean precooked or cured meat, such as sausages or meat loaves.  Dairy  Low-fat (1%) or fat-free (skim) milk. Reduced-fat, low-fat, or fat-free cheeses. Nonfat, low-sodium ricotta or cottage cheese. Low-fat or nonfat yogurt. Low-fat, low-sodium cheese.  Fats and oils  Soft margarine without trans fats. Vegetable oil. Reduced-fat, low-fat, or light mayonnaise and salad dressings (reduced-sodium). Canola, safflower, olive, avocado, soybean, and sunflower oils. Avocado.  Seasonings and condiments  Herbs. Spices. Seasoning mixes without salt.  Other foods  Unsalted popcorn and pretzels. Fat-free sweets.  The items listed above may not be a complete list of foods and beverages you can eat. Contact a dietitian for more information.  What foods should I avoid?  Fruits  Canned fruit in a light or heavy syrup. Fried fruit. Fruit in cream or butter sauce.  Vegetables  Creamed or fried vegetables. Vegetables in a cheese sauce. Regular canned vegetables (not low-sodium or reduced-sodium). Regular canned tomato sauce and paste (not low-sodium or reduced-sodium). Regular tomato and  vegetable juice (not low-sodium or reduced-sodium). Pickles. Olives.  Grains  Baked goods made with fat, such as croissants, muffins, or some breads. Dry pasta or rice meal packs.  Meats and other proteins  Fatty cuts of meat. Ribs. Fried meat. Guerra. Bologna, salami, and other precooked or cured meats, such as sausages or meat loaves. Fat from the back of a pig (fatback). Bratwurst. Salted nuts and seeds. Canned beans with added salt. Canned or smoked fish. Whole eggs or egg yolks. Chicken or turkey with skin.  Dairy  Whole or 2% milk, cream, and half-and-half. Whole or full-fat cream cheese. Whole-fat or sweetened yogurt. Full-fat cheese. Nondairy creamers. Whipped toppings. Processed cheese and cheese spreads.  Fats and oils  Butter. Stick margarine. Lard. Shortening. Ghee. Guerra fat. Tropical oils, such as coconut, palm kernel, or palm oil.  Seasonings and condiments  Onion salt, garlic salt, seasoned salt, table salt, and sea salt. Worcestershire sauce. Tartar sauce. Barbecue sauce. Teriyaki sauce. Soy sauce, including reduced-sodium. Steak sauce. Canned and packaged gravies. Fish sauce. Oyster sauce. Cocktail sauce. Store-bought horseradish. Ketchup. Mustard. Meat flavorings and tenderizers. Bouillon cubes. Hot sauces. Pre-made or packaged marinades. Pre-made or packaged taco seasonings. Relishes. Regular salad dressings.  Other foods  Salted popcorn and pretzels.  The items listed above may not be a complete list of foods and beverages you should avoid. Contact a dietitian for more information.  Where to find more information  · National Heart, Lung, and Blood [place]: www.nhlbi.nih.gov  · American Heart Association: www.heart.org  · Academy of Nutrition and Dietetics: www.eatright.org  · National Kidney Foundation: www.kidney.org  Summary  · The DASH eating plan is a healthy eating plan that has been shown to reduce high blood pressure (hypertension). It may also reduce your risk for type 2 diabetes, heart  disease, and stroke.  · When on the DASH eating plan, aim to eat more fresh fruits and vegetables, whole grains, lean proteins, low-fat dairy, and heart-healthy fats.  · With the DASH eating plan, you should limit salt (sodium) intake to 2,300 mg a day. If you have hypertension, you may need to reduce your sodium intake to 1,500 mg a day.  · Work with your health care provider or dietitian to adjust your eating plan to your individual calorie needs.  This information is not intended to replace advice given to you by your health care provider. Make sure you discuss any questions you have with your health care provider.  Document Revised: 11/20/2020 Document Reviewed: 11/20/2020  [employer] Patient Education © 2021 [employer]      BMI for Adults  What is BMI?  Body mass index (BMI) is a number that is calculated from a person's weight and height. BMI can help estimate how much of a person's weight is composed of fat. BMI does not measure body fat directly. Rather, it is an alternative to procedures that directly measure body fat, which can be difficult and expensive.  BMI can help identify people who may be at higher risk for certain medical problems.  What are BMI measurements used for?  BMI is used as a screening tool to identify possible weight problems. It helps determine whether a person is obese, overweight, a healthy weight, or underweight.  BMI is useful for:  · Identifying a weight problem that may be related to a medical condition or may increase the risk for medical problems.  · Promoting changes, such as changes in diet and exercise, to help reach a healthy weight. BMI screening can be repeated to see if these changes are working.  How is BMI calculated?  BMI involves measuring your weight in relation to your height. Both height and weight are measured, and the BMI is calculated from those numbers. This can be done either in English ([place] or metric measurements. Note that charts and online BMI  "calculators are available to help you find your BMI quickly and easily without having to do these calculations yourself.  To calculate your BMI in English ([place] measurements:    5. Measure your weight in pounds (lb).  6. Multiply the number of pounds by 703.  ? For example, for a person who weighs 180 lb, multiply that number by 703, which equals 126,540.  7. Measure your height in inches. Then multiply that number by itself to get a measurement called \"inches squared.\"  ? For example, for a person who is 70 inches tall, the \"inches squared\" measurement is 70 inches x 70 inches, which equals 4,900 inches squared.  8. Divide the total from step 2 (number of lb x 703) by the total from step 3 (inches squared): 126,540 ÷ 4,900 = 25.8. This is your BMI.  To calculate your BMI in metric measurements:  4. Measure your weight in kilograms (kg).  5. Measure your height in meters (m). Then multiply that number by itself to get a measurement called \"meters squared.\"  ? For example, for a person who is 1.75 m tall, the \"meters squared\" measurement is 1.75 m x 1.75 m, which is equal to 3.1 meters squared.  6. Divide the number of kilograms (your weight) by the meters squared number. In this example: 70 ÷ 3.1 = 22.6. This is your BMI.  What do the results mean?  BMI charts are used to identify whether you are underweight, normal weight, overweight, or obese. The following guidelines will be used:  · Underweight: BMI less than 18.5.  · Normal weight: BMI between 18.5 and 24.9.  · Overweight: BMI between 25 and 29.9.  · Obese: BMI of 30 or above.  Keep these notes in mind:  · Weight includes both fat and muscle, so someone with a muscular build, such as an athlete, may have a BMI that is higher than 24.9. In cases like these, BMI is not an accurate measure of body fat.  · To determine if excess body fat is the cause of a BMI of 25 or higher, further assessments may need to be done by a health care provider.  · BMI is usually " "interpreted in the same way for men and women.  Where to find more information  For more information about BMI, including tools to quickly calculate your BMI, go to these websites:  · Centers for Disease Control and Prevention: www.cdc.gov  · American Heart Association: www.heart.org  · National Heart, Lung, and Blood [place]: www.nhlbi.nih.gov  Summary  · Body mass index (BMI) is a number that is calculated from a person's weight and height.  · BMI may help estimate how much of a person's weight is composed of fat. BMI can help identify those who may be at higher risk for certain medical problems.  · BMI can be measured using English measurements or metric measurements.  · BMI charts are used to identify whether you are underweight, normal weight, overweight, or obese.  This information is not intended to replace advice given to you by your health care provider. Make sure you discuss any questions you have with your health care provider.  Document Revised: 09/09/2020 Document Reviewed: 07/17/2020  [employer] Patient Education © 2021 [employer]      https://www.nhlbi.nih.gov/files/docs/public/heart/dash_brief.pdf\">   DASH Eating Plan  DASH stands for Dietary Approaches to Stop Hypertension. The DASH eating plan is a healthy eating plan that has been shown to:  · Reduce high blood pressure (hypertension).  · Reduce your risk for type 2 diabetes, heart disease, and stroke.  · Help with weight loss.  What are tips for following this plan?  Reading food labels  · Check food labels for the amount of salt (sodium) per serving. Choose foods with less than 5 percent of the Daily Value of sodium. Generally, foods with less than 300 milligrams (mg) of sodium per serving fit into this eating plan.  · To find whole grains, look for the word \"whole\" as the first word in the ingredient list.  Shopping  · Buy products labeled as \"low-sodium\" or \"no salt added.\"  · Buy fresh foods. Avoid canned foods and pre-made or frozen " meals.  Cooking  · Avoid adding salt when cooking. Use salt-free seasonings or herbs instead of table salt or sea salt. Check with your health care provider or pharmacist before using salt substitutes.  · Do not ely foods. Cook foods using healthy methods such as baking, boiling, grilling, roasting, and broiling instead.  · Cook with heart-healthy oils, such as olive, canola, avocado, soybean, or sunflower oil.  Meal planning    · Eat a balanced diet that includes:  ? 4 or more servings of fruits and 4 or more servings of vegetables each day. Try to fill one-half of your plate with fruits and vegetables.  ? 6-8 servings of whole grains each day.  ? Less than 6 oz (170 g) of lean meat, poultry, or fish each day. A 3-oz (85-g) serving of meat is about the same size as a deck of cards. One egg equals 1 oz (28 g).  ? 2-3 servings of low-fat dairy each day. One serving is 1 cup (237 mL).  ? 1 serving of nuts, seeds, or beans 5 times each week.  ? 2-3 servings of heart-healthy fats. Healthy fats called omega-3 fatty acids are found in foods such as walnuts, flaxseeds, fortified milks, and eggs. These fats are also found in cold-water fish, such as sardines, salmon, and mackerel.  · Limit how much you eat of:  ? Canned or prepackaged foods.  ? Food that is high in trans fat, such as some fried foods.  ? Food that is high in saturated fat, such as fatty meat.  ? Desserts and other sweets, sugary drinks, and other foods with added sugar.  ? Full-fat dairy products.  · Do not salt foods before eating.  · Do not eat more than 4 egg yolks a week.  · Try to eat at least 2 vegetarian meals a week.  · Eat more home-cooked food and less restaurant, buffet, and fast food.  Lifestyle  · When eating at a restaurant, ask that your food be prepared with less salt or no salt, if possible.  · If you drink alcohol:  ? Limit how much you use to:  § 0-1 drink a day for women who are not pregnant.  § 0-2 drinks a day for men.  ? Be aware of  how much alcohol is in your drink. In the [place] one drink equals one 12 oz bottle of beer (355 mL), one 5 oz glass of wine (148 mL), or one 1½ oz glass of hard liquor (44 mL).  General information  · Avoid eating more than 2,300 mg of salt a day. If you have hypertension, you may need to reduce your sodium intake to 1,500 mg a day.  · Work with your health care provider to maintain a healthy body weight or to lose weight. Ask what an ideal weight is for you.  · Get at least 30 minutes of exercise that causes your heart to beat faster (aerobic exercise) most days of the week. Activities may include walking, swimming, or biking.  · Work with your health care provider or dietitian to adjust your eating plan to your individual calorie needs.  What foods should I eat?  Fruits  All fresh, dried, or frozen fruit. Canned fruit in natural juice (without added sugar).  Vegetables  Fresh or frozen vegetables (raw, steamed, roasted, or grilled). Low-sodium or reduced-sodium tomato and vegetable juice. Low-sodium or reduced-sodium tomato sauce and tomato paste. Low-sodium or reduced-sodium canned vegetables.  Grains  Whole-grain or whole-wheat bread. Whole-grain or whole-wheat pasta. Brown rice. Oatmeal. Quinoa. Bulgur. Whole-grain and low-sodium cereals. Gifty bread. Low-fat, low-sodium crackers. Whole-wheat flour tortillas.  Meats and other proteins  Skinless chicken or turkey. Ground chicken or turkey. Pork with fat trimmed off. Fish and seafood. Egg whites. Dried beans, peas, or lentils. Unsalted nuts, nut butters, and seeds. Unsalted canned beans. Lean cuts of beef with fat trimmed off. Low-sodium, lean precooked or cured meat, such as sausages or meat loaves.  Dairy  Low-fat (1%) or fat-free (skim) milk. Reduced-fat, low-fat, or fat-free cheeses. Nonfat, low-sodium ricotta or cottage cheese. Low-fat or nonfat yogurt. Low-fat, low-sodium cheese.  Fats and oils  Soft margarine without trans fats. Vegetable oil. Reduced-fat,  low-fat, or light mayonnaise and salad dressings (reduced-sodium). Canola, safflower, olive, avocado, soybean, and sunflower oils. Avocado.  Seasonings and condiments  Herbs. Spices. Seasoning mixes without salt.  Other foods  Unsalted popcorn and pretzels. Fat-free sweets.  The items listed above may not be a complete list of foods and beverages you can eat. Contact a dietitian for more information.  What foods should I avoid?  Fruits  Canned fruit in a light or heavy syrup. Fried fruit. Fruit in cream or butter sauce.  Vegetables  Creamed or fried vegetables. Vegetables in a cheese sauce. Regular canned vegetables (not low-sodium or reduced-sodium). Regular canned tomato sauce and paste (not low-sodium or reduced-sodium). Regular tomato and vegetable juice (not low-sodium or reduced-sodium). Pickles. Olives.  Grains  Baked goods made with fat, such as croissants, muffins, or some breads. Dry pasta or rice meal packs.  Meats and other proteins  Fatty cuts of meat. Ribs. Fried meat. Guerra. Bologna, salami, and other precooked or cured meats, such as sausages or meat loaves. Fat from the back of a pig (fatback). Bratwurst. Salted nuts and seeds. Canned beans with added salt. Canned or smoked fish. Whole eggs or egg yolks. Chicken or turkey with skin.  Dairy  Whole or 2% milk, cream, and half-and-half. Whole or full-fat cream cheese. Whole-fat or sweetened yogurt. Full-fat cheese. Nondairy creamers. Whipped toppings. Processed cheese and cheese spreads.  Fats and oils  Butter. Stick margarine. Lard. Shortening. Ghee. Guerra fat. Tropical oils, such as coconut, palm kernel, or palm oil.  Seasonings and condiments  Onion salt, garlic salt, seasoned salt, table salt, and sea salt. Worcestershire sauce. Tartar sauce. Barbecue sauce. Teriyaki sauce. Soy sauce, including reduced-sodium. Steak sauce. Canned and packaged gravies. Fish sauce. Oyster sauce. Cocktail sauce. Store-bought horseradish. Ketchup. Mustard. Meat  flavorings and tenderizers. Bouillon cubes. Hot sauces. Pre-made or packaged marinades. Pre-made or packaged taco seasonings. Relishes. Regular salad dressings.  Other foods  Salted popcorn and pretzels.  The items listed above may not be a complete list of foods and beverages you should avoid. Contact a dietitian for more information.  Where to find more information  · National Heart, Lung, and Blood [place]: www.nhlbi.nih.gov  · American Heart Association: www.heart.org  · Academy of Nutrition and Dietetics: www.eatright.org  · National Kidney Foundation: www.kidney.org  Summary  · The DASH eating plan is a healthy eating plan that has been shown to reduce high blood pressure (hypertension). It may also reduce your risk for type 2 diabetes, heart disease, and stroke.  · When on the DASH eating plan, aim to eat more fresh fruits and vegetables, whole grains, lean proteins, low-fat dairy, and heart-healthy fats.  · With the DASH eating plan, you should limit salt (sodium) intake to 2,300 mg a day. If you have hypertension, you may need to reduce your sodium intake to 1,500 mg a day.  · Work with your health care provider or dietitian to adjust your eating plan to your individual calorie needs.  This information is not intended to replace advice given to you by your health care provider. Make sure you discuss any questions you have with your health care provider.  Document Revised: 11/20/2020 Document Reviewed: 11/20/2020  Elsevier Patient Education © 2021 Else[employer] Inc.

## 2021-12-20 ENCOUNTER — OFFICE VISIT (OUTPATIENT)
Dept: NEUROSURGERY | Facility: CLINIC | Age: 21
End: 2021-12-20

## 2021-12-20 DIAGNOSIS — E66.09 CLASS 1 OBESITY DUE TO EXCESS CALORIES WITH BODY MASS INDEX (BMI) OF 31.0 TO 31.9 IN ADULT, UNSPECIFIED WHETHER SERIOUS COMORBIDITY PRESENT: ICD-10-CM

## 2021-12-20 DIAGNOSIS — M51.26 LUMBAR DISC HERNIATION: Primary | ICD-10-CM

## 2021-12-20 DIAGNOSIS — M54.16 LUMBAR RADICULOPATHY: ICD-10-CM

## 2021-12-20 DIAGNOSIS — F17.200 SMOKER: ICD-10-CM

## 2021-12-20 PROCEDURE — 99024 POSTOP FOLLOW-UP VISIT: CPT | Performed by: NURSE PRACTITIONER

## 2021-12-20 NOTE — PATIENT INSTRUCTIONS
"BMI for Adults  What is BMI?  Body mass index (BMI) is a number that is calculated from a person's weight and height. BMI can help estimate how much of a person's weight is composed of fat. BMI does not measure body fat directly. Rather, it is an alternative to procedures that directly measure body fat, which can be difficult and expensive.  BMI can help identify people who may be at higher risk for certain medical problems.  What are BMI measurements used for?  BMI is used as a screening tool to identify possible weight problems. It helps determine whether a person is obese, overweight, a healthy weight, or underweight.  BMI is useful for:  · Identifying a weight problem that may be related to a medical condition or may increase the risk for medical problems.  · Promoting changes, such as changes in diet and exercise, to help reach a healthy weight. BMI screening can be repeated to see if these changes are working.  How is BMI calculated?  BMI involves measuring your weight in relation to your height. Both height and weight are measured, and the BMI is calculated from those numbers. This can be done either in English ([place] or metric measurements. Note that charts and online BMI calculators are available to help you find your BMI quickly and easily without having to do these calculations yourself.  To calculate your BMI in English ([place] measurements:    1. Measure your weight in pounds (lb).  2. Multiply the number of pounds by 703.  ? For example, for a person who weighs 180 lb, multiply that number by 703, which equals 126,540.  3. Measure your height in inches. Then multiply that number by itself to get a measurement called \"inches squared.\"  ? For example, for a person who is 70 inches tall, the \"inches squared\" measurement is 70 inches x 70 inches, which equals 4,900 inches squared.  4. Divide the total from step 2 (number of lb x 703) by the total from step 3 (inches squared): 126,540 ÷ 4,900 = 25.8. This is " "your BMI.    To calculate your BMI in metric measurements:  1. Measure your weight in kilograms (kg).  2. Measure your height in meters (m). Then multiply that number by itself to get a measurement called \"meters squared.\"  ? For example, for a person who is 1.75 m tall, the \"meters squared\" measurement is 1.75 m x 1.75 m, which is equal to 3.1 meters squared.  3. Divide the number of kilograms (your weight) by the meters squared number. In this example: 70 ÷ 3.1 = 22.6. This is your BMI.  What do the results mean?  BMI charts are used to identify whether you are underweight, normal weight, overweight, or obese. The following guidelines will be used:  · Underweight: BMI less than 18.5.  · Normal weight: BMI between 18.5 and 24.9.  · Overweight: BMI between 25 and 29.9.  · Obese: BMI of 30 or above.  Keep these notes in mind:  · Weight includes both fat and muscle, so someone with a muscular build, such as an athlete, may have a BMI that is higher than 24.9. In cases like these, BMI is not an accurate measure of body fat.  · To determine if excess body fat is the cause of a BMI of 25 or higher, further assessments may need to be done by a health care provider.  · BMI is usually interpreted in the same way for men and women.  Where to find more information  For more information about BMI, including tools to quickly calculate your BMI, go to these websites:  · Centers for Disease Control and Prevention: www.cdc.gov  · American Heart Association: www.heart.org  · National Heart, Lung, and Blood [place]: www.nhlbi.nih.gov  Summary  · Body mass index (BMI) is a number that is calculated from a person's weight and height.  · BMI may help estimate how much of a person's weight is composed of fat. BMI can help identify those who may be at higher risk for certain medical problems.  · BMI can be measured using English measurements or metric measurements.  · BMI charts are used to identify whether you are underweight, normal " "weight, overweight, or obese.  This information is not intended to replace advice given to you by your health care provider. Make sure you discuss any questions you have with your health care provider.  Document Revised: 09/09/2020 Document Reviewed: 07/17/2020  [employer] Patient Education © 2021 [employer]      https://www.nhlbi.nih.gov/files/docs/public/heart/dash_brief.pdf\">   DASH Eating Plan  DASH stands for Dietary Approaches to Stop Hypertension. The DASH eating plan is a healthy eating plan that has been shown to:  · Reduce high blood pressure (hypertension).  · Reduce your risk for type 2 diabetes, heart disease, and stroke.  · Help with weight loss.  What are tips for following this plan?  Reading food labels  · Check food labels for the amount of salt (sodium) per serving. Choose foods with less than 5 percent of the Daily Value of sodium. Generally, foods with less than 300 milligrams (mg) of sodium per serving fit into this eating plan.  · To find whole grains, look for the word \"whole\" as the first word in the ingredient list.  Shopping  · Buy products labeled as \"low-sodium\" or \"no salt added.\"  · Buy fresh foods. Avoid canned foods and pre-made or frozen meals.  Cooking  · Avoid adding salt when cooking. Use salt-free seasonings or herbs instead of table salt or sea salt. Check with your health care provider or pharmacist before using salt substitutes.  · Do not ely foods. Cook foods using healthy methods such as baking, boiling, grilling, roasting, and broiling instead.  · Cook with heart-healthy oils, such as olive, canola, avocado, soybean, or sunflower oil.  Meal planning    · Eat a balanced diet that includes:  ? 4 or more servings of fruits and 4 or more servings of vegetables each day. Try to fill one-half of your plate with fruits and vegetables.  ? 6-8 servings of whole grains each day.  ? Less than 6 oz (170 g) of lean meat, poultry, or fish each day. A 3-oz (85-g) serving of meat is " about the same size as a deck of cards. One egg equals 1 oz (28 g).  ? 2-3 servings of low-fat dairy each day. One serving is 1 cup (237 mL).  ? 1 serving of nuts, seeds, or beans 5 times each week.  ? 2-3 servings of heart-healthy fats. Healthy fats called omega-3 fatty acids are found in foods such as walnuts, flaxseeds, fortified milks, and eggs. These fats are also found in cold-water fish, such as sardines, salmon, and mackerel.  · Limit how much you eat of:  ? Canned or prepackaged foods.  ? Food that is high in trans fat, such as some fried foods.  ? Food that is high in saturated fat, such as fatty meat.  ? Desserts and other sweets, sugary drinks, and other foods with added sugar.  ? Full-fat dairy products.  · Do not salt foods before eating.  · Do not eat more than 4 egg yolks a week.  · Try to eat at least 2 vegetarian meals a week.  · Eat more home-cooked food and less restaurant, buffet, and fast food.    Lifestyle  · When eating at a restaurant, ask that your food be prepared with less salt or no salt, if possible.  · If you drink alcohol:  ? Limit how much you use to:  § 0-1 drink a day for women who are not pregnant.  § 0-2 drinks a day for men.  ? Be aware of how much alcohol is in your drink. In the [place] one drink equals one 12 oz bottle of beer (355 mL), one 5 oz glass of wine (148 mL), or one 1½ oz glass of hard liquor (44 mL).  General information  · Avoid eating more than 2,300 mg of salt a day. If you have hypertension, you may need to reduce your sodium intake to 1,500 mg a day.  · Work with your health care provider to maintain a healthy body weight or to lose weight. Ask what an ideal weight is for you.  · Get at least 30 minutes of exercise that causes your heart to beat faster (aerobic exercise) most days of the week. Activities may include walking, swimming, or biking.  · Work with your health care provider or dietitian to adjust your eating plan to your individual calorie needs.  What  foods should I eat?  Fruits  All fresh, dried, or frozen fruit. Canned fruit in natural juice (without added sugar).  Vegetables  Fresh or frozen vegetables (raw, steamed, roasted, or grilled). Low-sodium or reduced-sodium tomato and vegetable juice. Low-sodium or reduced-sodium tomato sauce and tomato paste. Low-sodium or reduced-sodium canned vegetables.  Grains  Whole-grain or whole-wheat bread. Whole-grain or whole-wheat pasta. Brown rice. Oatmeal. Quinoa. Bulgur. Whole-grain and low-sodium cereals. Gifty bread. Low-fat, low-sodium crackers. Whole-wheat flour tortillas.  Meats and other proteins  Skinless chicken or turkey. Ground chicken or turkey. Pork with fat trimmed off. Fish and seafood. Egg whites. Dried beans, peas, or lentils. Unsalted nuts, nut butters, and seeds. Unsalted canned beans. Lean cuts of beef with fat trimmed off. Low-sodium, lean precooked or cured meat, such as sausages or meat loaves.  Dairy  Low-fat (1%) or fat-free (skim) milk. Reduced-fat, low-fat, or fat-free cheeses. Nonfat, low-sodium ricotta or cottage cheese. Low-fat or nonfat yogurt. Low-fat, low-sodium cheese.  Fats and oils  Soft margarine without trans fats. Vegetable oil. Reduced-fat, low-fat, or light mayonnaise and salad dressings (reduced-sodium). Canola, safflower, olive, avocado, soybean, and sunflower oils. Avocado.  Seasonings and condiments  Herbs. Spices. Seasoning mixes without salt.  Other foods  Unsalted popcorn and pretzels. Fat-free sweets.  The items listed above may not be a complete list of foods and beverages you can eat. Contact a dietitian for more information.  What foods should I avoid?  Fruits  Canned fruit in a light or heavy syrup. Fried fruit. Fruit in cream or butter sauce.  Vegetables  Creamed or fried vegetables. Vegetables in a cheese sauce. Regular canned vegetables (not low-sodium or reduced-sodium). Regular canned tomato sauce and paste (not low-sodium or reduced-sodium). Regular tomato and  vegetable juice (not low-sodium or reduced-sodium). Pickles. Olives.  Grains  Baked goods made with fat, such as croissants, muffins, or some breads. Dry pasta or rice meal packs.  Meats and other proteins  Fatty cuts of meat. Ribs. Fried meat. Guerra. Bologna, salami, and other precooked or cured meats, such as sausages or meat loaves. Fat from the back of a pig (fatback). Bratwurst. Salted nuts and seeds. Canned beans with added salt. Canned or smoked fish. Whole eggs or egg yolks. Chicken or turkey with skin.  Dairy  Whole or 2% milk, cream, and half-and-half. Whole or full-fat cream cheese. Whole-fat or sweetened yogurt. Full-fat cheese. Nondairy creamers. Whipped toppings. Processed cheese and cheese spreads.  Fats and oils  Butter. Stick margarine. Lard. Shortening. Ghee. Guerra fat. Tropical oils, such as coconut, palm kernel, or palm oil.  Seasonings and condiments  Onion salt, garlic salt, seasoned salt, table salt, and sea salt. Worcestershire sauce. Tartar sauce. Barbecue sauce. Teriyaki sauce. Soy sauce, including reduced-sodium. Steak sauce. Canned and packaged gravies. Fish sauce. Oyster sauce. Cocktail sauce. Store-bought horseradish. Ketchup. Mustard. Meat flavorings and tenderizers. Bouillon cubes. Hot sauces. Pre-made or packaged marinades. Pre-made or packaged taco seasonings. Relishes. Regular salad dressings.  Other foods  Salted popcorn and pretzels.  The items listed above may not be a complete list of foods and beverages you should avoid. Contact a dietitian for more information.  Where to find more information  · National Heart, Lung, and Blood [place]: www.nhlbi.nih.gov  · American Heart Association: www.heart.org  · Academy of Nutrition and Dietetics: www.eatright.org  · National Kidney Foundation: www.kidney.org  Summary  · The DASH eating plan is a healthy eating plan that has been shown to reduce high blood pressure (hypertension). It may also reduce your risk for type 2 diabetes, heart  disease, and stroke.  · When on the DASH eating plan, aim to eat more fresh fruits and vegetables, whole grains, lean proteins, low-fat dairy, and heart-healthy fats.  · With the DASH eating plan, you should limit salt (sodium) intake to 2,300 mg a day. If you have hypertension, you may need to reduce your sodium intake to 1,500 mg a day.  · Work with your health care provider or dietitian to adjust your eating plan to your individual calorie needs.  This information is not intended to replace advice given to you by your health care provider. Make sure you discuss any questions you have with your health care provider.  Document Revised: 11/20/2020 Document Reviewed: 11/20/2020  Elsevier Patient Education © 2021 Elsevier Inc.

## 2021-12-20 NOTE — PROGRESS NOTES
Chief complaint:   Chief Complaint   Patient presents with   • Post-op     Moriah is returning for her post op check          Subjective     HPI: This is a 21 y.o. female patient who went to the operating room on 11/19/2021 for a L4-5 right microdiscectomy.  Prior to the surgery the patient was complaining of back pain but mostly right lower extremity pain.  The patient is here in follow up today for postoperative visit.  She comes in today and states that she is doing well.  She is not complaining of any meaningful back pain.  Denies any lower extremity pain.  Denies any numbness or tingling.  The patient does state that she has done some bending and lifting since the surgery.  She is not taking any pain medication.  Overall she does feel that she is done well through the surgery at this time        Review of Systems   Musculoskeletal: Negative for back pain.   Neurological: Negative for numbness.         Objective      Vital Signs  There were no vitals taken for this visit.    Physical Exam  Constitutional:       Appearance: Normal appearance. She is well-developed.   HENT:      Head: Normocephalic.   Eyes:      General: Lids are normal.      Extraocular Movements: EOM normal.      Conjunctiva/sclera: Conjunctivae normal.      Pupils: Pupils are equal, round, and reactive to light.   Cardiovascular:      Rate and Rhythm: Normal rate and regular rhythm.   Pulmonary:      Effort: Pulmonary effort is normal.      Breath sounds: Normal breath sounds.   Musculoskeletal:         General: Normal range of motion.      Cervical back: Normal range of motion.   Skin:     General: Skin is warm.   Neurological:      Mental Status: She is alert and oriented to person, place, and time.      GCS: GCS eye subscore is 4. GCS verbal subscore is 5. GCS motor subscore is 6.      Cranial Nerves: No cranial nerve deficit.      Sensory: No sensory deficit.      Gait: Gait is intact.      Deep Tendon Reflexes: Strength normal and  reflexes are normal and symmetric. Reflexes normal.   Psychiatric:         Speech: Speech normal.         Behavior: Behavior normal.         Thought Content: Thought content normal.       Incisions clean dry and intact    Neurologic Exam     Mental Status   Oriented to person, place, and time.   Attention: normal. Concentration: normal.   Speech: speech is normal   Level of consciousness: alert  Normal comprehension.     Cranial Nerves     CN II   Visual fields full to confrontation.     CN III, IV, VI   Pupils are equal, round, and reactive to light.  Extraocular motions are normal.     CN V   Facial sensation intact.     CN VII   Facial expression full, symmetric.     CN VIII   CN VIII normal.     CN IX, X   CN IX normal.   CN X normal.     CN XI   CN XI normal.     CN XII   CN XII normal.     Motor Exam   Muscle bulk: normal    Strength   Strength 5/5 throughout.     Sensory Exam   Light touch normal.     Gait, Coordination, and Reflexes     Gait  Gait: normal    Reflexes   Reflexes 2+ except as noted.       Imaging review: No new imaging          Assessment/Plan: Patient is doing well not having any pain issues at this point.  She is 30 days past her surgery and does want to look into going back to work.  She does work in a kitchen I told her give her self another 2 weeks of increasing her activities and if she is doing well at that time she can go back to work.  We will have her follow-up with Dr. Orellana in 8 to 10 weeks with a video visit unless she has worsening pain at which time she was told to call us back.  Her questions and concerns were addressed    Patient is a nonsmoker  The patient's There is no height or weight on file to calculate BMI.. BMI is above normal parameters. Recommendations include: educational material and nutrition counseling    Diagnoses and all orders for this visit:    1. Lumbar disc herniation (Primary)    2. Lumbar radiculopathy    3. Smoker    4. Class 1 obesity due to excess  calories with body mass index (BMI) of 31.0 to 31.9 in adult, unspecified whether serious comorbidity present        I discussed the patients findings and my recommendations with patient  Demian Velez, APRN  12/20/21  14:49 CST

## 2022-04-12 ENCOUNTER — TELEPHONE (OUTPATIENT)
Dept: FAMILY MEDICINE CLINIC | Facility: CLINIC | Age: 22
End: 2022-04-12

## 2022-04-12 NOTE — TELEPHONE ENCOUNTER
Patient called stating she was wanting to quit smoking and wanted to know if she could get some of the patches prescribed to help her quit.     Please advise [phone]

## 2022-04-13 NOTE — TELEPHONE ENCOUNTER
Returned pt's call, she has never tried anything patches to help her quit smoking, but feels it is time to quit. Is there something you give send in for her?

## 2022-04-13 NOTE — TELEPHONE ENCOUNTER
Returned pt's call, she has never tried anything patches to help her quit smoking, but feels it is time to quit. Is there something you give send in for her?     Message above sent to DAVID Maynard

## 2022-04-14 ENCOUNTER — OFFICE VISIT (OUTPATIENT)
Dept: FAMILY MEDICINE CLINIC | Facility: CLINIC | Age: 22
End: 2022-04-14

## 2022-04-14 VITALS
TEMPERATURE: 97.3 F | SYSTOLIC BLOOD PRESSURE: 120 MMHG | HEART RATE: 95 BPM | HEIGHT: 64 IN | WEIGHT: 178.1 LBS | OXYGEN SATURATION: 98 % | RESPIRATION RATE: 20 BRPM | DIASTOLIC BLOOD PRESSURE: 76 MMHG | BODY MASS INDEX: 30.4 KG/M2

## 2022-04-14 DIAGNOSIS — R41.840 ATTENTION AND CONCENTRATION DEFICIT: ICD-10-CM

## 2022-04-14 DIAGNOSIS — F17.200 TOBACCO USE DISORDER: Primary | ICD-10-CM

## 2022-04-14 PROCEDURE — 99214 OFFICE O/P EST MOD 30 MIN: CPT | Performed by: NURSE PRACTITIONER

## 2022-04-14 RX ORDER — NICOTINE 21 MG/24HR
1 PATCH, TRANSDERMAL 24 HOURS TRANSDERMAL EVERY 24 HOURS
Qty: 30 PATCH | Refills: 2 | Status: SHIPPED | OUTPATIENT
Start: 2022-04-14 | End: 2022-08-25 | Stop reason: HOSPADM

## 2022-04-14 NOTE — PROGRESS NOTES
Subjective   Moriah Nicole is a 21 y.o. female.     CC: Tobacco use disorder, attention deficit    Nicotine Dependence  Presents for follow-up visit. Symptoms include decreased concentration. Symptoms are negative for fatigue and sore throat. Risk factors do not include stress.The symptoms have been stable. Compliance with prior treatments has been good (ready to quit).   Other  This is a chronic (problem with concentration and focus) problem. The current episode started more than 1 year ago. The problem occurs daily. The problem has been gradually worsening. Pertinent negatives include no abdominal pain, arthralgias, chest pain, chills, congestion, coughing, fatigue, fever, myalgias, nausea, rash, sore throat or vomiting. The symptoms are aggravated by stress. She has tried nothing for the symptoms. The treatment provided no relief.        The following portions of the patient's history were reviewed and updated as appropriate: allergies, current medications, past family history, past medical history, past social history, past surgical history and problem list.    Review of Systems   Constitutional: Negative for activity change, appetite change, chills, fatigue, fever, unexpected weight gain and unexpected weight loss.   HENT: Negative for congestion, sore throat, trouble swallowing and voice change.    Eyes: Negative.    Respiratory: Negative for cough, chest tightness, shortness of breath and wheezing.    Cardiovascular: Negative for chest pain, palpitations and leg swelling.   Gastrointestinal: Negative for abdominal pain, constipation, diarrhea, nausea and vomiting.   Endocrine: Negative.    Genitourinary: Negative for dysuria.   Musculoskeletal: Negative for arthralgias and myalgias.   Skin: Negative for rash.   Allergic/Immunologic: Negative.    Neurological: Negative.    Hematological: Negative.    Psychiatric/Behavioral: Positive for decreased concentration. Negative for agitation, behavioral  problems, dysphoric mood, hallucinations, self-injury, sleep disturbance, suicidal ideas, negative for hyperactivity, depressed mood and stress. The patient is not nervous/anxious.        Objective   Physical Exam  Vitals and nursing note reviewed.   Constitutional:       General: She is not in acute distress.     Appearance: Normal appearance. She is well-developed. She is obese. She is not ill-appearing, toxic-appearing or diaphoretic.   HENT:      Head: Normocephalic and atraumatic.   Eyes:      Conjunctiva/sclera: Conjunctivae normal.   Cardiovascular:      Rate and Rhythm: Normal rate and regular rhythm.      Heart sounds: Normal heart sounds.   Pulmonary:      Effort: Pulmonary effort is normal. No respiratory distress.      Breath sounds: Normal breath sounds. No stridor. No wheezing, rhonchi or rales.   Musculoskeletal:         General: No tenderness. Normal range of motion.      Cervical back: Normal range of motion.   Skin:     General: Skin is warm and dry.      Coloration: Skin is not pale.      Findings: No erythema or rash.   Neurological:      Mental Status: She is alert and oriented to person, place, and time.   Psychiatric:         Attention and Perception: Attention and perception normal.         Mood and Affect: Mood and affect normal.         Speech: Speech normal.         Behavior: Behavior normal. Behavior is cooperative.         Thought Content: Thought content normal. Thought content is not paranoid or delusional. Thought content does not include homicidal or suicidal ideation. Thought content does not include homicidal or suicidal plan.         Cognition and Memory: Cognition and memory normal.         Judgment: Judgment normal.           Assessment/Plan   Diagnoses and all orders for this visit:    1. Tobacco use disorder (Primary)  -     nicotine (NICODERM CQ) 21 MG/24HR patch; Place 1 patch on the skin as directed by provider Daily.  Dispense: 30 patch; Refill: 2   -Patient instructed not  to smoke while using nicotine patches.  We will continue to monitor.    2. Attention and concentration deficit  -     Ambulatory Referral to Psychology, follow-up scheduled.  Patient referred to psychology for ADHD evaluation.  Patient reports longstanding history of trouble focusing.  She reports the symptoms are getting worse with time.  We will review psychology report once completed.    3.  Follow-up as scheduled or sooner for any acute needs.            This document has been electronically signed by DAVID Ye on April 14, 2022 17:33 CDT

## 2022-04-18 ENCOUNTER — TELEPHONE (OUTPATIENT)
Dept: FAMILY MEDICINE CLINIC | Facility: CLINIC | Age: 22
End: 2022-04-18

## 2022-04-18 RX ORDER — CEFUROXIME AXETIL 500 MG/1
500 TABLET ORAL 2 TIMES DAILY
Qty: 20 TABLET | Refills: 0 | Status: SHIPPED | OUTPATIENT
Start: 2022-04-18 | End: 2022-04-28

## 2022-04-18 NOTE — TELEPHONE ENCOUNTER
Patient was called to let her know PCP sent prescription to pharmacy.  Follow up if symptoms fail to improve.

## 2022-04-18 NOTE — TELEPHONE ENCOUNTER
Ms. Nicole called stating she woke up on 4-16-22 with a UTI and would like to have Caesar call something into [facility]. Please call back @ [phone]

## 2022-04-29 ENCOUNTER — TELEPHONE (OUTPATIENT)
Dept: FAMILY MEDICINE CLINIC | Facility: CLINIC | Age: 22
End: 2022-04-29

## 2022-05-02 ENCOUNTER — TELEPHONE (OUTPATIENT)
Dept: FAMILY MEDICINE CLINIC | Facility: CLINIC | Age: 22
End: 2022-05-02

## 2022-07-04 ENCOUNTER — HOSPITAL ENCOUNTER (EMERGENCY)
Facility: HOSPITAL | Age: 22
Discharge: HOME | End: 2022-07-04
Attending: STUDENT IN AN ORGANIZED HEALTH CARE EDUCATION/TRAINING PROGRAM | Admitting: STUDENT IN AN ORGANIZED HEALTH CARE EDUCATION/TRAINING PROGRAM

## 2022-07-04 ENCOUNTER — APPOINTMENT (OUTPATIENT)
Dept: ULTRASOUND IMAGING | Facility: HOSPITAL | Age: 22
End: 2022-07-04

## 2022-07-04 VITALS
TEMPERATURE: 97.8 F | BODY MASS INDEX: 29.02 KG/M2 | HEART RATE: 78 BPM | WEIGHT: 170 LBS | RESPIRATION RATE: 18 BRPM | HEIGHT: 64 IN | DIASTOLIC BLOOD PRESSURE: 63 MMHG | OXYGEN SATURATION: 99 % | SYSTOLIC BLOOD PRESSURE: 103 MMHG

## 2022-07-04 DIAGNOSIS — R10.2 PELVIC PAIN: Primary | ICD-10-CM

## 2022-07-04 DIAGNOSIS — N93.9 VAGINAL BLEEDING: ICD-10-CM

## 2022-07-04 LAB
ABO GROUP BLD: NORMAL
ALBUMIN SERPL-MCNC: 4.9 G/DL (ref 3.5–5.2)
ALBUMIN/GLOB SERPL: 2 G/DL
ALP SERPL-CCNC: 92 U/L (ref 39–117)
ALT SERPL W P-5'-P-CCNC: 9 U/L (ref 1–33)
ANION GAP SERPL CALCULATED.3IONS-SCNC: 10 MMOL/L (ref 5–15)
AST SERPL-CCNC: 23 U/L (ref 1–32)
BACTERIA UR QL AUTO: ABNORMAL /HPF
BASOPHILS # BLD AUTO: 0.04 10*3/MM3 (ref 0–0.2)
BASOPHILS NFR BLD AUTO: 0.5 % (ref 0–1.5)
BILIRUB SERPL-MCNC: 0.2 MG/DL (ref 0–1.2)
BILIRUB UR QL STRIP: NEGATIVE
BUN SERPL-MCNC: 13 MG/DL (ref 6–20)
BUN/CREAT SERPL: 21.7 (ref 7–25)
CALCIUM SPEC-SCNC: 9.2 MG/DL (ref 8.6–10.5)
CHLORIDE SERPL-SCNC: 103 MMOL/L (ref 98–107)
CLARITY UR: ABNORMAL
CO2 SERPL-SCNC: 26 MMOL/L (ref 22–29)
COLOR UR: YELLOW
CREAT SERPL-MCNC: 0.6 MG/DL (ref 0.57–1)
DEPRECATED RDW RBC AUTO: 42.5 FL (ref 37–54)
EGFRCR SERPLBLD CKD-EPI 2021: 131.2 ML/MIN/1.73
EOSINOPHIL # BLD AUTO: 0.16 10*3/MM3 (ref 0–0.4)
EOSINOPHIL NFR BLD AUTO: 2.2 % (ref 0.3–6.2)
ERYTHROCYTE [DISTWIDTH] IN BLOOD BY AUTOMATED COUNT: 14.5 % (ref 12.3–15.4)
GLOBULIN UR ELPH-MCNC: 2.4 GM/DL
GLUCOSE SERPL-MCNC: 96 MG/DL (ref 65–99)
GLUCOSE UR STRIP-MCNC: NEGATIVE MG/DL
HCG INTACT+B SERPL-ACNC: <0.1 MIU/ML
HCT VFR BLD AUTO: 39.4 % (ref 34–46.6)
HGB BLD-MCNC: 13.2 G/DL (ref 12–15.9)
HGB UR QL STRIP.AUTO: NEGATIVE
HYALINE CASTS UR QL AUTO: ABNORMAL /LPF
IMM GRANULOCYTES # BLD AUTO: 0.02 10*3/MM3 (ref 0–0.05)
IMM GRANULOCYTES NFR BLD AUTO: 0.3 % (ref 0–0.5)
KETONES UR QL STRIP: NEGATIVE
LEUKOCYTE ESTERASE UR QL STRIP.AUTO: ABNORMAL
LYMPHOCYTES # BLD AUTO: 2.44 10*3/MM3 (ref 0.7–3.1)
LYMPHOCYTES NFR BLD AUTO: 33 % (ref 19.6–45.3)
MAGNESIUM SERPL-MCNC: 2 MG/DL (ref 1.6–2.6)
MCH RBC QN AUTO: 27.2 PG (ref 26.6–33)
MCHC RBC AUTO-ENTMCNC: 33.5 G/DL (ref 31.5–35.7)
MCV RBC AUTO: 81.1 FL (ref 79–97)
MONOCYTES # BLD AUTO: 0.56 10*3/MM3 (ref 0.1–0.9)
MONOCYTES NFR BLD AUTO: 7.6 % (ref 5–12)
NEUTROPHILS NFR BLD AUTO: 4.17 10*3/MM3 (ref 1.7–7)
NEUTROPHILS NFR BLD AUTO: 56.4 % (ref 42.7–76)
NITRITE UR QL STRIP: NEGATIVE
NRBC BLD AUTO-RTO: 0 /100 WBC (ref 0–0.2)
PH UR STRIP.AUTO: 6 [PH] (ref 5–9)
PLATELET # BLD AUTO: 256 10*3/MM3 (ref 140–450)
PMV BLD AUTO: 9.4 FL (ref 6–12)
POTASSIUM SERPL-SCNC: 4.2 MMOL/L (ref 3.5–5.2)
PROT SERPL-MCNC: 7.3 G/DL (ref 6–8.5)
PROT UR QL STRIP: NEGATIVE
RBC # BLD AUTO: 4.86 10*6/MM3 (ref 3.77–5.28)
RBC # UR STRIP: ABNORMAL /HPF
REF LAB TEST METHOD: ABNORMAL
RH BLD: POSITIVE
SODIUM SERPL-SCNC: 139 MMOL/L (ref 136–145)
SP GR UR STRIP: 1.02 (ref 1–1.03)
SQUAMOUS #/AREA URNS HPF: ABNORMAL /HPF
UROBILINOGEN UR QL STRIP: ABNORMAL
WBC # UR STRIP: ABNORMAL /HPF
WBC NRBC COR # BLD: 7.39 10*3/MM3 (ref 3.4–10.8)
WHOLE BLOOD HOLD COAG: NORMAL

## 2022-07-04 PROCEDURE — 84702 CHORIONIC GONADOTROPIN TEST: CPT | Performed by: STUDENT IN AN ORGANIZED HEALTH CARE EDUCATION/TRAINING PROGRAM

## 2022-07-04 PROCEDURE — 85025 COMPLETE CBC W/AUTO DIFF WBC: CPT | Performed by: STUDENT IN AN ORGANIZED HEALTH CARE EDUCATION/TRAINING PROGRAM

## 2022-07-04 PROCEDURE — 76830 TRANSVAGINAL US NON-OB: CPT

## 2022-07-04 PROCEDURE — 96374 THER/PROPH/DIAG INJ IV PUSH: CPT

## 2022-07-04 PROCEDURE — 25010000002 MORPHINE PER 10 MG: Performed by: STUDENT IN AN ORGANIZED HEALTH CARE EDUCATION/TRAINING PROGRAM

## 2022-07-04 PROCEDURE — 93976 VASCULAR STUDY: CPT

## 2022-07-04 PROCEDURE — 81001 URINALYSIS AUTO W/SCOPE: CPT | Performed by: STUDENT IN AN ORGANIZED HEALTH CARE EDUCATION/TRAINING PROGRAM

## 2022-07-04 PROCEDURE — 96372 THER/PROPH/DIAG INJ SC/IM: CPT

## 2022-07-04 PROCEDURE — 86900 BLOOD TYPING SEROLOGIC ABO: CPT | Performed by: STUDENT IN AN ORGANIZED HEALTH CARE EDUCATION/TRAINING PROGRAM

## 2022-07-04 PROCEDURE — 25010000002 KETOROLAC TROMETHAMINE PER 15 MG: Performed by: STUDENT IN AN ORGANIZED HEALTH CARE EDUCATION/TRAINING PROGRAM

## 2022-07-04 PROCEDURE — 80053 COMPREHEN METABOLIC PANEL: CPT | Performed by: STUDENT IN AN ORGANIZED HEALTH CARE EDUCATION/TRAINING PROGRAM

## 2022-07-04 PROCEDURE — 86901 BLOOD TYPING SEROLOGIC RH(D): CPT | Performed by: STUDENT IN AN ORGANIZED HEALTH CARE EDUCATION/TRAINING PROGRAM

## 2022-07-04 PROCEDURE — 83735 ASSAY OF MAGNESIUM: CPT | Performed by: STUDENT IN AN ORGANIZED HEALTH CARE EDUCATION/TRAINING PROGRAM

## 2022-07-04 PROCEDURE — 99283 EMERGENCY DEPT VISIT LOW MDM: CPT

## 2022-07-04 RX ORDER — KETOROLAC TROMETHAMINE 30 MG/ML
60 INJECTION, SOLUTION INTRAMUSCULAR; INTRAVENOUS ONCE
Status: COMPLETED | OUTPATIENT
Start: 2022-07-04 | End: 2022-07-04

## 2022-07-04 RX ORDER — KETOROLAC TROMETHAMINE 10 MG/1
10 TABLET, FILM COATED ORAL EVERY 6 HOURS PRN
Qty: 12 TABLET | Refills: 0 | Status: SHIPPED | OUTPATIENT
Start: 2022-07-04 | End: 2022-08-25

## 2022-07-04 RX ADMIN — MORPHINE SULFATE 4 MG: 4 INJECTION INTRAVENOUS at 13:48

## 2022-07-04 RX ADMIN — KETOROLAC TROMETHAMINE 60 MG: 30 INJECTION, SOLUTION INTRAMUSCULAR; INTRAVENOUS at 13:48

## 2022-07-04 NOTE — ED PROVIDER NOTES
Subjective   22-year-old polite female comes to the ER chief complaint of lower abdominal/pelvic pain and vaginal bleeding.  It is at time of the month for her menstrual cycle.  The symptoms have been getting worse over the last 2 to 3 days.  Patient does not think she is pregnant.  The pain she describes as cramping and does not radiate anywhere.  She rates it a 10/10 when it is at the worst.  She has had difficulty getting out of bed due to the pain.      History provided by:  Patient   used: No        Review of Systems   Constitutional: Positive for activity change. Negative for chills and fever.   HENT: Negative for drooling.    Eyes: Negative for redness.   Respiratory: Negative for cough, chest tightness, shortness of breath and wheezing.    Cardiovascular: Negative for chest pain.   Gastrointestinal: Positive for abdominal pain. Negative for nausea and vomiting.   Genitourinary: Positive for pelvic pain and vaginal bleeding. Negative for decreased urine volume, flank pain, urgency and vaginal discharge.   Skin: Negative for color change.   Neurological: Negative for seizures.   Psychiatric/Behavioral: Negative for confusion.       Past Medical History:   Diagnosis Date   • Allergic    • Anxiety    • Lumbar radiculopathy 11/15/2021   • Obesity    • Postural hypotension    • Urinary tract infection        No Known Allergies    Past Surgical History:   Procedure Laterality Date   • LUMBAR DISCECTOMY Right 11/19/2021    Procedure: LUMBAR DISCECTOMY L4-5 RIGHT;  Surgeon: Bony Orellana MD;  Location: [facility];  Service: Neurosurgery;  Laterality: Right;       Family History   Problem Relation Age of Onset   • Heart attack Father    • Heart disease Father    • Diabetes Mother    • Stroke Mother    • Schizophrenia Brother    • No Known Problems Son    • Hepatitis Maternal Grandmother    • Schizophrenia Maternal Grandmother    • Diabetes Paternal Grandmother    • Heart disease Paternal  "Grandfather    • Heart attack Paternal Grandfather    • Diabetes Paternal Grandfather    • No Known Problems Brother        Social History     Socioeconomic History   • Marital status:    • Highest education level: High school graduate   Tobacco Use   • Smoking status: Current Every Day Smoker     Packs/day: 0.50     Types: Cigarettes   • Smokeless tobacco: Never Used   Vaping Use   • Vaping Use: Some days   • Substances: Flavoring   • Devices: Disposable, Pre-filled pod   Substance and Sexual Activity   • Alcohol use: Never   • Drug use: Not Currently     Types: Marijuana     Comment: former   • Sexual activity: Defer     Partners: Male           Objective    Vitals:    07/04/22 1135 07/04/22 1256   BP: 112/72 100/66   BP Location: Right arm Left arm   Patient Position: Sitting Sitting   Pulse: 91 74   Resp: 22 19   Temp: 97.8 °F (36.6 °C)    TempSrc: Oral    SpO2: 99% 99%   Weight: 77.1 kg (170 lb)    Height: 162.6 cm (64\")        Physical Exam  Vitals and nursing note reviewed.   Constitutional:       General: She is not in acute distress.     Appearance: She is well-developed. She is obese. She is ill-appearing. She is not toxic-appearing or diaphoretic.   HENT:      Head: Normocephalic.      Right Ear: External ear normal.      Left Ear: External ear normal.      Nose: No congestion or rhinorrhea.   Eyes:      General: No scleral icterus.     Conjunctiva/sclera: Conjunctivae normal.   Pulmonary:      Effort: Pulmonary effort is normal. No accessory muscle usage or respiratory distress.      Breath sounds: No wheezing.   Chest:      Chest wall: No tenderness.   Abdominal:      General: Bowel sounds are normal.      Palpations: Abdomen is soft.      Tenderness: There is abdominal tenderness (deep palpation). There is no right CVA tenderness, left CVA tenderness, guarding or rebound.   Skin:     General: Skin is warm and dry.      Capillary Refill: Capillary refill takes less than 2 seconds.   Neurological: "      Mental Status: She is alert and oriented to person, place, and time.   Psychiatric:         Behavior: Behavior normal.         Procedures           ED Course      Results for orders placed or performed during the hospital encounter of 07/04/22   Comprehensive Metabolic Panel    Specimen: Blood   Result Value Ref Range    Glucose 96 65 - 99 mg/dL    BUN 13 6 - 20 mg/dL    Creatinine 0.60 0.57 - 1.00 mg/dL    Sodium 139 136 - 145 mmol/L    Potassium 4.2 3.5 - 5.2 mmol/L    Chloride 103 98 - 107 mmol/L    CO2 26.0 22.0 - 29.0 mmol/L    Calcium 9.2 8.6 - 10.5 mg/dL    Total Protein 7.3 6.0 - 8.5 g/dL    Albumin 4.90 3.50 - 5.20 g/dL    ALT (SGPT) 9 1 - 33 U/L    AST (SGOT) 23 1 - 32 U/L    Alkaline Phosphatase 92 39 - 117 U/L    Total Bilirubin 0.2 0.0 - 1.2 mg/dL    Globulin 2.4 gm/dL    A/G Ratio 2.0 g/dL    BUN/Creatinine Ratio 21.7 7.0 - 25.0    Anion Gap 10.0 5.0 - 15.0 mmol/L    eGFR 131.2 >60.0 mL/min/1.73   hCG, Quantitative, Pregnancy    Specimen: Blood   Result Value Ref Range    HCG Quantitative <0.10 mIU/mL   Urinalysis With Microscopic If Indicated (No Culture) - Urine, Clean Catch    Specimen: Urine, Clean Catch   Result Value Ref Range    Color, UA Yellow Yellow, Straw, Dark Yellow, Mary    Appearance, UA Cloudy (A) Clear    pH, UA 6.0 5.0 - 9.0    Specific Gravity, UA 1.021 1.003 - 1.030    Glucose, UA Negative Negative    Ketones, UA Negative Negative    Bilirubin, UA Negative Negative    Blood, UA Negative Negative    Protein, UA Negative Negative    Leuk Esterase, UA Small (1+) (A) Negative    Nitrite, UA Negative Negative    Urobilinogen, UA 1.0 E.U./dL 0.2 - 1.0 E.U./dL   Magnesium    Specimen: Blood   Result Value Ref Range    Magnesium 2.0 1.6 - 2.6 mg/dL   CBC Auto Differential    Specimen: Blood   Result Value Ref Range    WBC 7.39 3.40 - 10.80 10*3/mm3    RBC 4.86 3.77 - 5.28 10*6/mm3    Hemoglobin 13.2 12.0 - 15.9 g/dL    Hematocrit 39.4 34.0 - 46.6 %    MCV 81.1 79.0 - 97.0 fL    MCH  27.2 26.6 - 33.0 pg    MCHC 33.5 31.5 - 35.7 g/dL    RDW 14.5 12.3 - 15.4 %    RDW-SD 42.5 37.0 - 54.0 fl    MPV 9.4 6.0 - 12.0 fL    Platelets 256 140 - 450 10*3/mm3    Neutrophil % 56.4 42.7 - 76.0 %    Lymphocyte % 33.0 19.6 - 45.3 %    Monocyte % 7.6 5.0 - 12.0 %    Eosinophil % 2.2 0.3 - 6.2 %    Basophil % 0.5 0.0 - 1.5 %    Immature Grans % 0.3 0.0 - 0.5 %    Neutrophils, Absolute 4.17 1.70 - 7.00 10*3/mm3    Lymphocytes, Absolute 2.44 0.70 - 3.10 10*3/mm3    Monocytes, Absolute 0.56 0.10 - 0.90 10*3/mm3    Eosinophils, Absolute 0.16 0.00 - 0.40 10*3/mm3    Basophils, Absolute 0.04 0.00 - 0.20 10*3/mm3    Immature Grans, Absolute 0.02 0.00 - 0.05 10*3/mm3    nRBC 0.0 0.0 - 0.2 /100 WBC   Urinalysis, Microscopic Only - Urine, Clean Catch    Specimen: Urine, Clean Catch   Result Value Ref Range    RBC, UA 3-5 (A) None Seen /HPF    WBC, UA 6-12 (A) None Seen, 0-2, 3-5 /HPF    Bacteria, UA None Seen None Seen /HPF    Squamous Epithelial Cells, UA 6-12 (A) None Seen, 0-2 /HPF    Hyaline Casts, UA None Seen None Seen /LPF    Methodology Automated Microscopy    ABO / Rh    Specimen: Blood   Result Value Ref Range    ABO Type O     RH type Positive      US Testicular or Ovarian Vascular Limited   Final Result   CONCLUSION:   Normal uterus.   Normal-sized ovaries with several less than 1 cm collection or   cyst.   Doppler and color Doppler demonstrate arterial and venous flow in   each ovary.   Very minimal free fluid in the cul-de-sac.      [id number]      Electronically signed by:  Corey Westbrook MD  7/4/2022 12:52 PM CDT   Workstation: [id number]      US Non-ob Transvaginal   Final Result   CONCLUSION:   Normal uterus.   Normal-sized ovaries with several less than 1 cm collection or   cyst.   Doppler and color Doppler demonstrate arterial and venous flow in   each ovary.   Very minimal free fluid in the cul-de-sac.      [id number]      Electronically signed by:  Corey Westbrook MD  7/4/2022 12:52 PM CDT   Workstation: [id number]                                              MDM  Number of Diagnoses or Management Options  Pelvic pain: new and requires workup  Vaginal bleeding: new and requires workup  Diagnosis management comments: Vital signs are stable, afebrile.  Labs significant for a hemoglobin of 13, pregnancy test is negative.  Good ovarian blood flow on ultrasound.  No acute findings on transvaginal ultrasound.  Patient received pain medicine in the ER.  Recommend follow-up with her PCP and OB/GYN.  Patient states understanding and is agreeable to the plan.  We will call in Toradol.      Final diagnoses:   Pelvic pain   Vaginal bleeding       ED Disposition  ED Disposition     ED Disposition   Discharge    Condition   Stable    Comment   --             Ramon Vargas, APRN  [address]  [facility] [address] [phone]    Schedule an appointment as soon as possible for a visit in 2 days  ER follow up    Bony Rosas MD  [address] [place], [place] [address] [phone]    Call in 1 day  ER follow up         Medication List      New Prescriptions    ketorolac 10 MG tablet  Commonly known as: TORADOL  Take 1 tablet by mouth Every 6 (Six) Hours As Needed for Moderate Pain .           Where to Get Your Medications      These medications were sent to [facility] - [place], [place] - [facility] - [phone] [facility] [phone] [facility] [place] [id number]    Phone: [phone]   · ketorolac 10 MG tablet          Vinod Lobato MD  07/04/22 0192

## 2022-07-19 ENCOUNTER — OFFICE VISIT (OUTPATIENT)
Dept: BEHAVIORAL HEALTH | Facility: CLINIC | Age: 22
End: 2022-07-19

## 2022-07-19 DIAGNOSIS — F41.1 GENERALIZED ANXIETY DISORDER: Primary | ICD-10-CM

## 2022-07-19 PROCEDURE — 90791 PSYCH DIAGNOSTIC EVALUATION: CPT | Performed by: PSYCHOLOGIST
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
## 2022-08-12 ENCOUNTER — TELEPHONE (OUTPATIENT)
Dept: OBSTETRICS AND GYNECOLOGY | Facility: CLINIC | Age: 22
End: 2022-08-12

## 2022-08-12 RX ORDER — ONDANSETRON 4 MG/1
4 TABLET, FILM COATED ORAL DAILY PRN
Qty: 30 TABLET | Refills: 1 | Status: ON HOLD | OUTPATIENT
Start: 2022-08-12 | End: 2022-09-16 | Stop reason: SDUPTHER

## 2022-08-12 RX ORDER — DIPHENHYDRAMINE HYDROCHLORIDE 25 MG/1
25 CAPSULE ORAL DAILY
Qty: 90 TABLET | Refills: 1 | Status: SHIPPED | OUTPATIENT
Start: 2022-08-12 | End: 2022-08-28

## 2022-08-12 NOTE — TELEPHONE ENCOUNTER
PT to see Chelsie on the 25th. PT said she hasn't been able to keep anything down for days. She requested something for nausea sent to [facility] pharmacy. PT can be reached at number on file to discuss further if necessary.

## 2022-08-17 ENCOUNTER — APPOINTMENT (OUTPATIENT)
Dept: ULTRASOUND IMAGING | Facility: HOSPITAL | Age: 22
End: 2022-08-17

## 2022-08-17 ENCOUNTER — HOSPITAL ENCOUNTER (EMERGENCY)
Facility: HOSPITAL | Age: 22
Discharge: HOME | End: 2022-08-17
Attending: STUDENT IN AN ORGANIZED HEALTH CARE EDUCATION/TRAINING PROGRAM | Admitting: STUDENT IN AN ORGANIZED HEALTH CARE EDUCATION/TRAINING PROGRAM

## 2022-08-17 ENCOUNTER — TELEPHONE (OUTPATIENT)
Dept: OBSTETRICS AND GYNECOLOGY | Facility: CLINIC | Age: 22
End: 2022-08-17

## 2022-08-17 VITALS
HEART RATE: 74 BPM | HEIGHT: 63 IN | TEMPERATURE: 97.9 F | WEIGHT: 174.6 LBS | RESPIRATION RATE: 18 BRPM | DIASTOLIC BLOOD PRESSURE: 82 MMHG | BODY MASS INDEX: 30.94 KG/M2 | OXYGEN SATURATION: 95 % | SYSTOLIC BLOOD PRESSURE: 110 MMHG

## 2022-08-17 DIAGNOSIS — E86.0 DEHYDRATION: ICD-10-CM

## 2022-08-17 DIAGNOSIS — R11.2 NAUSEA AND VOMITING, UNSPECIFIED VOMITING TYPE: Primary | ICD-10-CM

## 2022-08-17 DIAGNOSIS — Z3A.01 LESS THAN 8 WEEKS GESTATION OF PREGNANCY: ICD-10-CM

## 2022-08-17 LAB
ABO GROUP BLD: NORMAL
ALBUMIN SERPL-MCNC: 4.9 G/DL (ref 3.5–5.2)
ALBUMIN/GLOB SERPL: 2 G/DL
ALP SERPL-CCNC: 69 U/L (ref 39–117)
ALT SERPL W P-5'-P-CCNC: 12 U/L (ref 1–33)
ANION GAP SERPL CALCULATED.3IONS-SCNC: 13 MMOL/L (ref 5–15)
AST SERPL-CCNC: 17 U/L (ref 1–32)
BACTERIA UR QL AUTO: ABNORMAL /HPF
BASOPHILS # BLD AUTO: 0.04 10*3/MM3 (ref 0–0.2)
BASOPHILS NFR BLD AUTO: 0.5 % (ref 0–1.5)
BILIRUB SERPL-MCNC: 0.7 MG/DL (ref 0–1.2)
BILIRUB UR QL STRIP: NEGATIVE
BUN SERPL-MCNC: 8 MG/DL (ref 6–20)
BUN/CREAT SERPL: 14.8 (ref 7–25)
CALCIUM SPEC-SCNC: 9.3 MG/DL (ref 8.6–10.5)
CHLORIDE SERPL-SCNC: 101 MMOL/L (ref 98–107)
CLARITY UR: ABNORMAL
CO2 SERPL-SCNC: 24 MMOL/L (ref 22–29)
COLOR UR: ABNORMAL
CREAT SERPL-MCNC: 0.54 MG/DL (ref 0.57–1)
DEPRECATED RDW RBC AUTO: 41 FL (ref 37–54)
EGFRCR SERPLBLD CKD-EPI 2021: 134.5 ML/MIN/1.73
EOSINOPHIL # BLD AUTO: 0.1 10*3/MM3 (ref 0–0.4)
EOSINOPHIL NFR BLD AUTO: 1.2 % (ref 0.3–6.2)
ERYTHROCYTE [DISTWIDTH] IN BLOOD BY AUTOMATED COUNT: 14 % (ref 12.3–15.4)
FLUAV RNA RESP QL NAA+PROBE: NOT DETECTED
FLUBV RNA RESP QL NAA+PROBE: NOT DETECTED
GLOBULIN UR ELPH-MCNC: 2.4 GM/DL
GLUCOSE SERPL-MCNC: 91 MG/DL (ref 65–99)
GLUCOSE UR STRIP-MCNC: NEGATIVE MG/DL
HCG INTACT+B SERPL-ACNC: NORMAL MIU/ML
HCT VFR BLD AUTO: 40.7 % (ref 34–46.6)
HGB BLD-MCNC: 13.7 G/DL (ref 12–15.9)
HGB UR QL STRIP.AUTO: NEGATIVE
HYALINE CASTS UR QL AUTO: ABNORMAL /LPF
IMM GRANULOCYTES # BLD AUTO: 0.03 10*3/MM3 (ref 0–0.05)
IMM GRANULOCYTES NFR BLD AUTO: 0.4 % (ref 0–0.5)
KETONES UR QL STRIP: ABNORMAL
LEUKOCYTE ESTERASE UR QL STRIP.AUTO: ABNORMAL
LYMPHOCYTES # BLD AUTO: 2.38 10*3/MM3 (ref 0.7–3.1)
LYMPHOCYTES NFR BLD AUTO: 29.2 % (ref 19.6–45.3)
MCH RBC QN AUTO: 27.3 PG (ref 26.6–33)
MCHC RBC AUTO-ENTMCNC: 33.7 G/DL (ref 31.5–35.7)
MCV RBC AUTO: 81.1 FL (ref 79–97)
MONOCYTES # BLD AUTO: 0.61 10*3/MM3 (ref 0.1–0.9)
MONOCYTES NFR BLD AUTO: 7.5 % (ref 5–12)
NEUTROPHILS NFR BLD AUTO: 4.99 10*3/MM3 (ref 1.7–7)
NEUTROPHILS NFR BLD AUTO: 61.2 % (ref 42.7–76)
NITRITE UR QL STRIP: NEGATIVE
NRBC BLD AUTO-RTO: 0 /100 WBC (ref 0–0.2)
PH UR STRIP.AUTO: 7 [PH] (ref 5–9)
PLATELET # BLD AUTO: 314 10*3/MM3 (ref 140–450)
PMV BLD AUTO: 9.4 FL (ref 6–12)
POTASSIUM SERPL-SCNC: 3.8 MMOL/L (ref 3.5–5.2)
PROT SERPL-MCNC: 7.3 G/DL (ref 6–8.5)
PROT UR QL STRIP: ABNORMAL
RBC # BLD AUTO: 5.02 10*6/MM3 (ref 3.77–5.28)
RBC # UR STRIP: ABNORMAL /HPF
REF LAB TEST METHOD: ABNORMAL
RH BLD: POSITIVE
SARS-COV-2 RNA RESP QL NAA+PROBE: NOT DETECTED
SODIUM SERPL-SCNC: 138 MMOL/L (ref 136–145)
SP GR UR STRIP: 1.03 (ref 1–1.03)
SQUAMOUS #/AREA URNS HPF: ABNORMAL /HPF
UROBILINOGEN UR QL STRIP: ABNORMAL
WBC # UR STRIP: ABNORMAL /HPF
WBC NRBC COR # BLD: 8.15 10*3/MM3 (ref 3.4–10.8)
WHOLE BLOOD HOLD COAG: NORMAL

## 2022-08-17 PROCEDURE — 96374 THER/PROPH/DIAG INJ IV PUSH: CPT

## 2022-08-17 PROCEDURE — 86900 BLOOD TYPING SEROLOGIC ABO: CPT | Performed by: STUDENT IN AN ORGANIZED HEALTH CARE EDUCATION/TRAINING PROGRAM

## 2022-08-17 PROCEDURE — 76817 TRANSVAGINAL US OBSTETRIC: CPT

## 2022-08-17 PROCEDURE — 87636 SARSCOV2 & INF A&B AMP PRB: CPT | Performed by: STUDENT IN AN ORGANIZED HEALTH CARE EDUCATION/TRAINING PROGRAM

## 2022-08-17 PROCEDURE — 85025 COMPLETE CBC W/AUTO DIFF WBC: CPT | Performed by: STUDENT IN AN ORGANIZED HEALTH CARE EDUCATION/TRAINING PROGRAM

## 2022-08-17 PROCEDURE — 81001 URINALYSIS AUTO W/SCOPE: CPT | Performed by: STUDENT IN AN ORGANIZED HEALTH CARE EDUCATION/TRAINING PROGRAM

## 2022-08-17 PROCEDURE — 99283 EMERGENCY DEPT VISIT LOW MDM: CPT

## 2022-08-17 PROCEDURE — 25010000002 PROCHLORPERAZINE 10 MG/2ML SOLUTION: Performed by: STUDENT IN AN ORGANIZED HEALTH CARE EDUCATION/TRAINING PROGRAM

## 2022-08-17 PROCEDURE — 80053 COMPREHEN METABOLIC PANEL: CPT | Performed by: STUDENT IN AN ORGANIZED HEALTH CARE EDUCATION/TRAINING PROGRAM

## 2022-08-17 PROCEDURE — 84702 CHORIONIC GONADOTROPIN TEST: CPT | Performed by: STUDENT IN AN ORGANIZED HEALTH CARE EDUCATION/TRAINING PROGRAM

## 2022-08-17 PROCEDURE — 86901 BLOOD TYPING SEROLOGIC RH(D): CPT | Performed by: STUDENT IN AN ORGANIZED HEALTH CARE EDUCATION/TRAINING PROGRAM

## 2022-08-17 PROCEDURE — 96375 TX/PRO/DX INJ NEW DRUG ADDON: CPT

## 2022-08-17 PROCEDURE — 25010000002 ONDANSETRON PER 1 MG: Performed by: STUDENT IN AN ORGANIZED HEALTH CARE EDUCATION/TRAINING PROGRAM

## 2022-08-17 PROCEDURE — 25010000002 METOCLOPRAMIDE PER 10 MG: Performed by: STUDENT IN AN ORGANIZED HEALTH CARE EDUCATION/TRAINING PROGRAM

## 2022-08-17 RX ORDER — METOCLOPRAMIDE HYDROCHLORIDE 5 MG/ML
10 INJECTION INTRAMUSCULAR; INTRAVENOUS ONCE
Status: COMPLETED | OUTPATIENT
Start: 2022-08-17 | End: 2022-08-17

## 2022-08-17 RX ORDER — ONDANSETRON 4 MG/1
4 TABLET, ORALLY DISINTEGRATING ORAL 4 TIMES DAILY PRN
Qty: 12 TABLET | Refills: 0 | Status: SHIPPED | OUTPATIENT
Start: 2022-08-17 | End: 2022-08-31 | Stop reason: SDUPTHER

## 2022-08-17 RX ORDER — METOCLOPRAMIDE 5 MG/1
5 TABLET ORAL 3 TIMES DAILY PRN
Qty: 12 TABLET | Refills: 0 | Status: SHIPPED | OUTPATIENT
Start: 2022-08-17 | End: 2022-09-14

## 2022-08-17 RX ORDER — ONDANSETRON 2 MG/ML
4 INJECTION INTRAMUSCULAR; INTRAVENOUS ONCE
Status: COMPLETED | OUTPATIENT
Start: 2022-08-17 | End: 2022-08-17

## 2022-08-17 RX ORDER — PROCHLORPERAZINE EDISYLATE 5 MG/ML
5 INJECTION INTRAMUSCULAR; INTRAVENOUS ONCE
Status: COMPLETED | OUTPATIENT
Start: 2022-08-17 | End: 2022-08-17

## 2022-08-17 RX ADMIN — PROCHLORPERAZINE EDISYLATE 5 MG: 5 INJECTION INTRAMUSCULAR; INTRAVENOUS at 11:34

## 2022-08-17 RX ADMIN — SODIUM CHLORIDE, POTASSIUM CHLORIDE, SODIUM LACTATE AND CALCIUM CHLORIDE 1000 ML: 600; 310; 30; 20 INJECTION, SOLUTION INTRAVENOUS at 12:59

## 2022-08-17 RX ADMIN — METOCLOPRAMIDE 10 MG: 5 INJECTION, SOLUTION INTRAMUSCULAR; INTRAVENOUS at 11:34

## 2022-08-17 RX ADMIN — SODIUM CHLORIDE, POTASSIUM CHLORIDE, SODIUM LACTATE AND CALCIUM CHLORIDE 1000 ML: 600; 310; 30; 20 INJECTION, SOLUTION INTRAVENOUS at 11:33

## 2022-08-17 RX ADMIN — ONDANSETRON 4 MG: 2 INJECTION INTRAMUSCULAR; INTRAVENOUS at 11:34

## 2022-08-17 NOTE — ED PROVIDER NOTES
Subjective   22-year-old female comes to the ER chief complaint of nausea vomiting for the last 2 weeks.  Patient thinks it is morning sickness.  She thinks she is about 6 to 8 weeks pregnant, but is not sure.  No urinary symptoms, no vaginal bleeding or discharge.  She does endorse having some diarrhea.  She reports feeling very dry and exhausted because she has been through growing up around-the-clock for the last couple of weeks.  She did have morning sickness for her prior pregnancy, but was only in the morning.  Patient has tried numerous over-the-counter medications this time, but none have worked.      History provided by:  Patient   used: No        Review of Systems   Constitutional: Positive for activity change, appetite change and fatigue. Negative for chills and fever.   HENT: Negative for drooling.    Eyes: Negative for redness.   Respiratory: Negative for cough, chest tightness, shortness of breath and wheezing.    Cardiovascular: Negative for chest pain and palpitations.   Gastrointestinal: Positive for abdominal pain, diarrhea, nausea and vomiting.   Genitourinary: Negative for flank pain, vaginal bleeding and vaginal discharge.   Skin: Negative for color change.   Neurological: Negative for seizures.   Psychiatric/Behavioral: Negative for confusion.       Past Medical History:   Diagnosis Date   • Allergic    • Anxiety    • Lumbar radiculopathy 11/15/2021   • Obesity    • Postural hypotension    • Urinary tract infection        No Known Allergies    Past Surgical History:   Procedure Laterality Date   • LUMBAR DISCECTOMY Right 11/19/2021    Procedure: LUMBAR DISCECTOMY L4-5 RIGHT;  Surgeon: Bony Orellana MD;  Location: [facility];  Service: Neurosurgery;  Laterality: Right;       Family History   Problem Relation Age of Onset   • Heart attack Father    • Heart disease Father    • Diabetes Mother    • Stroke Mother    • Schizophrenia Brother    • No Known Problems Son    •  "Hepatitis Maternal Grandmother    • Schizophrenia Maternal Grandmother    • Diabetes Paternal Grandmother    • Heart disease Paternal Grandfather    • Heart attack Paternal Grandfather    • Diabetes Paternal Grandfather    • No Known Problems Brother        Social History     Socioeconomic History   • Marital status:    • Highest education level: High school graduate   Tobacco Use   • Smoking status: Current Every Day Smoker     Packs/day: 0.50     Types: Cigarettes   • Smokeless tobacco: Never Used   Vaping Use   • Vaping Use: Some days   • Substances: Flavoring   • Devices: Disposable, Pre-filled pod   Substance and Sexual Activity   • Alcohol use: Never   • Drug use: Not Currently     Types: Marijuana     Comment: former   • Sexual activity: Defer     Partners: Male           Objective    Vitals:    08/17/22 1012 08/17/22 1210 08/17/22 1318   BP: 116/65 113/55 110/82   BP Location: Right arm Left arm    Patient Position: Sitting Lying    Pulse: 76 71 74   Resp: 20 18 18   Temp: 97.9 °F (36.6 °C)     TempSrc: Oral     SpO2: 98% 99% 95%   Weight: 79.2 kg (174 lb 9.6 oz)     Height: 160 cm (63\")         Physical Exam  Vitals and nursing note reviewed.   Constitutional:       General: She is not in acute distress.     Appearance: She is well-developed. She is obese. She is ill-appearing. She is not toxic-appearing or diaphoretic.   HENT:      Head: Normocephalic.      Right Ear: External ear normal.      Left Ear: External ear normal.   Cardiovascular:      Rate and Rhythm: Normal rate.   Pulmonary:      Effort: Pulmonary effort is normal. No accessory muscle usage or respiratory distress.      Breath sounds: No wheezing.   Chest:      Chest wall: No tenderness.   Abdominal:      General: Bowel sounds are normal.      Palpations: Abdomen is soft.      Tenderness: There is no abdominal tenderness (deep palpation). There is no guarding or rebound.   Skin:     General: Skin is warm and dry.      Capillary Refill: " Capillary refill takes 2 to 3 seconds.   Neurological:      Mental Status: She is alert and oriented to person, place, and time.   Psychiatric:         Behavior: Behavior normal.         Procedures           ED Course      Results for orders placed or performed during the hospital encounter of 08/17/22   COVID-19 and FLU A/B PCR - Swab, Nasopharynx    Specimen: Nasopharynx; Swab   Result Value Ref Range    COVID19 Not Detected Not Detected - Ref. Range    Influenza A PCR Not Detected Not Detected    Influenza B PCR Not Detected Not Detected   Comprehensive Metabolic Panel    Specimen: Blood   Result Value Ref Range    Glucose 91 65 - 99 mg/dL    BUN 8 6 - 20 mg/dL    Creatinine 0.54 (L) 0.57 - 1.00 mg/dL    Sodium 138 136 - 145 mmol/L    Potassium 3.8 3.5 - 5.2 mmol/L    Chloride 101 98 - 107 mmol/L    CO2 24.0 22.0 - 29.0 mmol/L    Calcium 9.3 8.6 - 10.5 mg/dL    Total Protein 7.3 6.0 - 8.5 g/dL    Albumin 4.90 3.50 - 5.20 g/dL    ALT (SGPT) 12 1 - 33 U/L    AST (SGOT) 17 1 - 32 U/L    Alkaline Phosphatase 69 39 - 117 U/L    Total Bilirubin 0.7 0.0 - 1.2 mg/dL    Globulin 2.4 gm/dL    A/G Ratio 2.0 g/dL    BUN/Creatinine Ratio 14.8 7.0 - 25.0    Anion Gap 13.0 5.0 - 15.0 mmol/L    eGFR 134.5 >60.0 mL/min/1.73   hCG, Quantitative, Pregnancy    Specimen: Blood   Result Value Ref Range    HCG Quantitative 36,754.00 mIU/mL   Urinalysis With Microscopic If Indicated (No Culture) - Urine, Clean Catch    Specimen: Urine, Clean Catch   Result Value Ref Range    Color, UA Dark Yellow Yellow, Straw, Dark Yellow, Mary    Appearance, UA Cloudy (A) Clear    pH, UA 7.0 5.0 - 9.0    Specific Gravity, UA 1.028 1.003 - 1.030    Glucose, UA Negative Negative    Ketones, UA >=160 mg/dL (4+) (A) Negative    Bilirubin, UA Negative Negative    Blood, UA Negative Negative    Protein, UA 30 mg/dL (1+) (A) Negative    Leuk Esterase, UA Small (1+) (A) Negative    Nitrite, UA Negative Negative    Urobilinogen, UA 1.0 E.U./dL 0.2 - 1.0  E.U./dL   CBC Auto Differential    Specimen: Blood   Result Value Ref Range    WBC 8.15 3.40 - 10.80 10*3/mm3    RBC 5.02 3.77 - 5.28 10*6/mm3    Hemoglobin 13.7 12.0 - 15.9 g/dL    Hematocrit 40.7 34.0 - 46.6 %    MCV 81.1 79.0 - 97.0 fL    MCH 27.3 26.6 - 33.0 pg    MCHC 33.7 31.5 - 35.7 g/dL    RDW 14.0 12.3 - 15.4 %    RDW-SD 41.0 37.0 - 54.0 fl    MPV 9.4 6.0 - 12.0 fL    Platelets 314 140 - 450 10*3/mm3    Neutrophil % 61.2 42.7 - 76.0 %    Lymphocyte % 29.2 19.6 - 45.3 %    Monocyte % 7.5 5.0 - 12.0 %    Eosinophil % 1.2 0.3 - 6.2 %    Basophil % 0.5 0.0 - 1.5 %    Immature Grans % 0.4 0.0 - 0.5 %    Neutrophils, Absolute 4.99 1.70 - 7.00 10*3/mm3    Lymphocytes, Absolute 2.38 0.70 - 3.10 10*3/mm3    Monocytes, Absolute 0.61 0.10 - 0.90 10*3/mm3    Eosinophils, Absolute 0.10 0.00 - 0.40 10*3/mm3    Basophils, Absolute 0.04 0.00 - 0.20 10*3/mm3    Immature Grans, Absolute 0.03 0.00 - 0.05 10*3/mm3    nRBC 0.0 0.0 - 0.2 /100 WBC   Urinalysis, Microscopic Only - Urine, Clean Catch    Specimen: Urine, Clean Catch   Result Value Ref Range    RBC, UA None Seen None Seen /HPF    WBC, UA 0-2 None Seen, 0-2, 3-5 /HPF    Bacteria, UA Trace (A) None Seen /HPF    Squamous Epithelial Cells, UA 13-20 (A) None Seen, 0-2 /HPF    Hyaline Casts, UA None Seen None Seen /LPF    Methodology Manual Light Microscopy    ABO / Rh    Specimen: Blood   Result Value Ref Range    ABO Type O     RH type Positive    Light Blue Top   Result Value Ref Range    Extra Tube Hold for add-ons.      US Ob Transvaginal   Final Result   CONCLUSION:     Single live intrauterine pregnancy of approximately six weeks   four days gestational age.      Electronically signed by:  Chirag Tiwari MD  8/17/2022 1:34 PM CDT   Workstation: [id number]                MDM  Number of Diagnoses or Management Options  Dehydration: new and requires workup  Less than 8 weeks gestation of pregnancy: new and requires workup  Nausea and vomiting, unspecified vomiting  type: new and requires workup  Diagnosis management comments: Vital signs are stable, afebrile.  Labs obtained significant for 4+ ketones, glucose of 91.  She is Rh+.  OB ultrasound shows live single intrauterine pregnancy of about 6 to 7 weeks.  Patient received antiemetics and IVF bolus.  On reevaluation patient reports feeling much better and is ready to go home.  We will call in antiemetics for her.  Recommend follow-up with PCP and OB.  Return precautions given.  Patient states understanding and is agreeable to the plan.      Final diagnoses:   Nausea and vomiting, unspecified vomiting type   Dehydration   Less than 8 weeks gestation of pregnancy       ED Disposition  ED Disposition     ED Disposition   Discharge    Condition   Stable    Comment   --             Ramon Vargas, APRN  [address]  [facility] [address] [phone]    Schedule an appointment as soon as possible for a visit in 2 days  ER follow up    Gita Brock, [address]  [address]  [facility] [address] [phone]    Call today  ER follow up         Medication List      New Prescriptions    metoclopramide 5 MG tablet  Commonly known as: REGLAN  Take 1 tablet by mouth 3 (Three) Times a Day As Needed (Nausea/Vomiting).     ondansetron ODT 4 MG disintegrating tablet  Commonly known as: ZOFRAN-ODT  Place 1 tablet on the tongue 4 (Four) Times a Day As Needed for Nausea or Vomiting.           Where to Get Your Medications      These medications were sent to [facility] - [place], [place] - [address] [facility] - [phone]  - [phone] [facility] [facility] [address]    Phone: [phone]   · metoclopramide 5 MG tablet  · ondansetron ODT 4 MG disintegrating tablet          Vinod Lobato MD  08/17/22 9568

## 2022-08-17 NOTE — TELEPHONE ENCOUNTER
Patient called stating she has tried every nausea medicine she tried. That she can't eat or drink anything that her teeth hurt so bad from throwing up so much stomach acid for the last week or so and that she felt so dehydrated. Let patient know she should go into the ER for fluids

## 2022-08-25 ENCOUNTER — LAB (OUTPATIENT)
Dept: LAB | Facility: HOSPITAL | Age: 22
End: 2022-08-25

## 2022-08-25 ENCOUNTER — INITIAL PRENATAL (OUTPATIENT)
Dept: OBSTETRICS AND GYNECOLOGY | Facility: CLINIC | Age: 22
End: 2022-08-25

## 2022-08-25 VITALS — BODY MASS INDEX: 31.21 KG/M2 | WEIGHT: 176.2 LBS

## 2022-08-25 DIAGNOSIS — Z34.81 PRENATAL CARE, SUBSEQUENT PREGNANCY IN FIRST TRIMESTER: Primary | ICD-10-CM

## 2022-08-25 PROBLEM — F19.20 DRUG DEPENDENCE AFFECTING PREGNANCY IN FIRST TRIMESTER: Status: ACTIVE | Noted: 2022-08-25

## 2022-08-25 PROBLEM — O99.321 DRUG DEPENDENCE AFFECTING PREGNANCY IN FIRST TRIMESTER: Status: ACTIVE | Noted: 2022-08-25

## 2022-08-25 LAB
ABO GROUP BLD: NORMAL
AMPHET+METHAMPHET UR QL: NEGATIVE
AMPHETAMINES UR QL: NEGATIVE
BARBITURATES UR QL SCN: NEGATIVE
BENZODIAZ UR QL SCN: NEGATIVE
BLD GP AB SCN SERPL QL: NEGATIVE
BUPRENORPHINE SERPL-MCNC: NEGATIVE NG/ML
CANNABINOIDS SERPL QL: POSITIVE
COCAINE UR QL: NEGATIVE
Lab: NORMAL
METHADONE UR QL SCN: NEGATIVE
OPIATES UR QL: NEGATIVE
OXYCODONE UR QL SCN: NEGATIVE
PCP UR QL SCN: NEGATIVE
PROPOXYPH UR QL: NEGATIVE
RH BLD: POSITIVE
TRICYCLICS UR QL SCN: NEGATIVE

## 2022-08-25 PROCEDURE — 36415 COLL VENOUS BLD VENIPUNCTURE: CPT

## 2022-08-25 PROCEDURE — G0480 DRUG TEST DEF 1-7 CLASSES: HCPCS | Performed by: OBSTETRICS & GYNECOLOGY

## 2022-08-25 PROCEDURE — 87591 N.GONORRHOEAE DNA AMP PROB: CPT | Performed by: OBSTETRICS & GYNECOLOGY

## 2022-08-25 PROCEDURE — 80081 OBSTETRIC PANEL INC HIV TSTG: CPT | Performed by: OBSTETRICS & GYNECOLOGY

## 2022-08-25 PROCEDURE — 81003 URINALYSIS AUTO W/O SCOPE: CPT | Performed by: OBSTETRICS & GYNECOLOGY

## 2022-08-25 PROCEDURE — 87661 TRICHOMONAS VAGINALIS AMPLIF: CPT | Performed by: OBSTETRICS & GYNECOLOGY

## 2022-08-25 PROCEDURE — 86803 HEPATITIS C AB TEST: CPT | Performed by: OBSTETRICS & GYNECOLOGY

## 2022-08-25 PROCEDURE — 80306 DRUG TEST PRSMV INSTRMNT: CPT | Performed by: OBSTETRICS & GYNECOLOGY

## 2022-08-25 PROCEDURE — 87086 URINE CULTURE/COLONY COUNT: CPT | Performed by: OBSTETRICS & GYNECOLOGY

## 2022-08-25 PROCEDURE — 87491 CHLMYD TRACH DNA AMP PROBE: CPT | Performed by: OBSTETRICS & GYNECOLOGY

## 2022-08-25 RX ORDER — PROMETHAZINE HYDROCHLORIDE 25 MG/1
25 TABLET ORAL EVERY 6 HOURS PRN
COMMUNITY
End: 2022-08-31 | Stop reason: SDUPTHER

## 2022-08-25 RX ORDER — PRENATAL VIT NO.126/IRON/FOLIC 28MG-0.8MG
TABLET ORAL DAILY
COMMUNITY
End: 2022-09-16 | Stop reason: HOSPADM

## 2022-08-25 NOTE — PROGRESS NOTES
I spent approximately 60 minutes with the patient acquiring the health and history intake, reviewing prior records, discussing topics related to healthy pregnancy, entering orders, and documentation. She is accompanied by her . Her LMP is 7/1/22. She went to the ER on 8/17/22 for nausea and vomiting. She had an ultrasound done on that day and was 6 weeks and 4 days gestation. Her EDC is 4/7/23.  This is her 2nd pregnancy. She had a vaginal delivery with her son on 12/7/20 at 39 weeks gestation.  Dr. Garcia delivered.   She is still having problems with nausea and vomiting. She is taking Reglan, Zofran, Phenergan, Vitamin B6, and Unisom for nausea.   A newob bag is given. The 1st trimester teaching was done with the patient. We discussed a healthy diet and exercise and what is recommended. I also discussed Listeriosis and Toxoplasmosis. She does not like fish but will eat [place]. I informed patient not to be in hot tubs, saunas, or tanning beds. We discussed that spotting may occur after intercourse which is common, but if heavy bleeding like a period occurs to call [facility] or hospital if clinic is closed.  I encouraged her to make an appointment with the dentist if she has not had a dental exam and cleaning in the last 6 months.  I instructed the patient that alcohol, illicit drug use, and tobacco smoking are not recommended in pregnancy. She is currently still smoking cigarettes and vape. She plans to quit. She stopped smoking marijuana; however, she now does CBD to help with pain and appetite. I told her we do not recommend that either. I also told her to try not to be around any secondhand smoke either.  She is unsure if she wants to breastfeed.  She filled out the health department referral form. She is interested in WIC and smoking cessation classes. She also filled out the depression screening questionnaire and scored 2. I talked to her about education classes. I told her that we are  doing in-person classes now. I gave her a schedule of the upcoming classes. I encouraged the patient to get the TDAP vaccine in the 3rd trimester.  I discussed with the patient that a pediatrician needs to be chosen prior to delivery for the infant to have an appointment scheduled before leaving the hospital. Her son sees Dr. Albarran, and they plan to continue with her.  I discussed lab tests will be done today. She is unsure of her last pap smear. All questions were answered at this time. She is scheduled to see Dr. Brock on 9/14/22.

## 2022-08-26 LAB
BACTERIA SPEC AEROBE CULT: NO GROWTH
BASOPHILS # BLD AUTO: 0.04 10*3/MM3 (ref 0–0.2)
BASOPHILS NFR BLD AUTO: 0.4 % (ref 0–1.5)
BILIRUB UR QL STRIP: NEGATIVE
C TRACH RRNA CVX QL NAA+PROBE: NEGATIVE
CLARITY UR: ABNORMAL
COLOR UR: ABNORMAL
DEPRECATED RDW RBC AUTO: 39.8 FL (ref 37–54)
EOSINOPHIL # BLD AUTO: 0.07 10*3/MM3 (ref 0–0.4)
EOSINOPHIL NFR BLD AUTO: 0.7 % (ref 0.3–6.2)
ERYTHROCYTE [DISTWIDTH] IN BLOOD BY AUTOMATED COUNT: 13.8 % (ref 12.3–15.4)
GLUCOSE UR STRIP-MCNC: NEGATIVE MG/DL
HBV SURFACE AG SERPL QL IA: NORMAL
HCT VFR BLD AUTO: 40.8 % (ref 34–46.6)
HCV AB SER DONR QL: NORMAL
HGB BLD-MCNC: 13.5 G/DL (ref 12–15.9)
HGB UR QL STRIP.AUTO: NEGATIVE
HIV1+2 AB SER QL: NORMAL
IMM GRANULOCYTES # BLD AUTO: 0.04 10*3/MM3 (ref 0–0.05)
IMM GRANULOCYTES NFR BLD AUTO: 0.4 % (ref 0–0.5)
KETONES UR QL STRIP: ABNORMAL
LEUKOCYTE ESTERASE UR QL STRIP.AUTO: NEGATIVE
LYMPHOCYTES # BLD AUTO: 2.4 10*3/MM3 (ref 0.7–3.1)
LYMPHOCYTES NFR BLD AUTO: 23.6 % (ref 19.6–45.3)
MCH RBC QN AUTO: 27 PG (ref 26.6–33)
MCHC RBC AUTO-ENTMCNC: 33.1 G/DL (ref 31.5–35.7)
MCV RBC AUTO: 81.6 FL (ref 79–97)
MONOCYTES # BLD AUTO: 0.81 10*3/MM3 (ref 0.1–0.9)
MONOCYTES NFR BLD AUTO: 8 % (ref 5–12)
N GONORRHOEA RRNA SPEC QL NAA+PROBE: NEGATIVE
NEUTROPHILS NFR BLD AUTO: 6.82 10*3/MM3 (ref 1.7–7)
NEUTROPHILS NFR BLD AUTO: 66.9 % (ref 42.7–76)
NITRITE UR QL STRIP: NEGATIVE
NRBC BLD AUTO-RTO: 0 /100 WBC (ref 0–0.2)
PH UR STRIP.AUTO: 7 [PH] (ref 5–8)
PLATELET # BLD AUTO: 377 10*3/MM3 (ref 140–450)
PMV BLD AUTO: 10.2 FL (ref 6–12)
PROT UR QL STRIP: ABNORMAL
RBC # BLD AUTO: 5 10*6/MM3 (ref 3.77–5.28)
RPR SER QL: NORMAL
SP GR UR STRIP: 1.02 (ref 1–1.03)
TRICHOMONAS VAGINALIS PCR: NEGATIVE
UROBILINOGEN UR QL STRIP: ABNORMAL
WBC NRBC COR # BLD: 10.18 10*3/MM3 (ref 3.4–10.8)

## 2022-08-27 LAB — RUBV IGG SERPL IA-ACNC: 3.77 INDEX

## 2022-08-28 ENCOUNTER — HOSPITAL ENCOUNTER (EMERGENCY)
Facility: HOSPITAL | Age: 22
Discharge: HOME | End: 2022-08-29
Attending: EMERGENCY MEDICINE | Admitting: EMERGENCY MEDICINE

## 2022-08-28 DIAGNOSIS — O21.9 EXCESSIVE VOMITING IN PREGNANCY: Primary | ICD-10-CM

## 2022-08-28 PROCEDURE — 99284 EMERGENCY DEPT VISIT MOD MDM: CPT

## 2022-08-29 ENCOUNTER — TELEPHONE (OUTPATIENT)
Dept: OBSTETRICS AND GYNECOLOGY | Facility: CLINIC | Age: 22
End: 2022-08-29

## 2022-08-29 VITALS
DIASTOLIC BLOOD PRESSURE: 65 MMHG | SYSTOLIC BLOOD PRESSURE: 113 MMHG | HEIGHT: 64 IN | BODY MASS INDEX: 30.05 KG/M2 | OXYGEN SATURATION: 100 % | TEMPERATURE: 97.9 F | RESPIRATION RATE: 18 BRPM | WEIGHT: 176 LBS | HEART RATE: 70 BPM

## 2022-08-29 LAB
ALBUMIN SERPL-MCNC: 4.2 G/DL (ref 3.5–5.2)
ALBUMIN/GLOB SERPL: 2.1 G/DL
ALP SERPL-CCNC: 61 U/L (ref 39–117)
ALT SERPL W P-5'-P-CCNC: 61 U/L (ref 1–33)
ANION GAP SERPL CALCULATED.3IONS-SCNC: 11 MMOL/L (ref 5–15)
AST SERPL-CCNC: 25 U/L (ref 1–32)
BACTERIA UR QL AUTO: ABNORMAL /HPF
BASOPHILS # BLD AUTO: 0.04 10*3/MM3 (ref 0–0.2)
BASOPHILS NFR BLD AUTO: 0.3 % (ref 0–1.5)
BILIRUB SERPL-MCNC: 0.4 MG/DL (ref 0–1.2)
BILIRUB UR QL STRIP: NEGATIVE
BUN SERPL-MCNC: 7 MG/DL (ref 6–20)
BUN/CREAT SERPL: 15.6 (ref 7–25)
CALCIUM SPEC-SCNC: 8.5 MG/DL (ref 8.6–10.5)
CHLORIDE SERPL-SCNC: 103 MMOL/L (ref 98–107)
CLARITY UR: ABNORMAL
CO2 SERPL-SCNC: 22 MMOL/L (ref 22–29)
COLOR UR: YELLOW
CREAT SERPL-MCNC: 0.45 MG/DL (ref 0.57–1)
DEPRECATED RDW RBC AUTO: 41.9 FL (ref 37–54)
EGFRCR SERPLBLD CKD-EPI 2021: 140.6 ML/MIN/1.73
EOSINOPHIL # BLD AUTO: 0.06 10*3/MM3 (ref 0–0.4)
EOSINOPHIL NFR BLD AUTO: 0.4 % (ref 0.3–6.2)
ERYTHROCYTE [DISTWIDTH] IN BLOOD BY AUTOMATED COUNT: 14.2 % (ref 12.3–15.4)
GLOBULIN UR ELPH-MCNC: 2 GM/DL
GLUCOSE SERPL-MCNC: 89 MG/DL (ref 65–99)
GLUCOSE UR STRIP-MCNC: NEGATIVE MG/DL
HCG INTACT+B SERPL-ACNC: NORMAL MIU/ML
HCT VFR BLD AUTO: 33.8 % (ref 34–46.6)
HGB BLD-MCNC: 11.7 G/DL (ref 12–15.9)
HGB UR QL STRIP.AUTO: NEGATIVE
HYALINE CASTS UR QL AUTO: ABNORMAL /LPF
IMM GRANULOCYTES # BLD AUTO: 0.09 10*3/MM3 (ref 0–0.05)
IMM GRANULOCYTES NFR BLD AUTO: 0.6 % (ref 0–0.5)
KETONES UR QL STRIP: ABNORMAL
LEUKOCYTE ESTERASE UR QL STRIP.AUTO: ABNORMAL
LYMPHOCYTES # BLD AUTO: 1.75 10*3/MM3 (ref 0.7–3.1)
LYMPHOCYTES NFR BLD AUTO: 12.6 % (ref 19.6–45.3)
MAGNESIUM SERPL-MCNC: 1.6 MG/DL (ref 1.6–2.6)
MCH RBC QN AUTO: 28.1 PG (ref 26.6–33)
MCHC RBC AUTO-ENTMCNC: 34.6 G/DL (ref 31.5–35.7)
MCV RBC AUTO: 81.1 FL (ref 79–97)
MONOCYTES # BLD AUTO: 0.76 10*3/MM3 (ref 0.1–0.9)
MONOCYTES NFR BLD AUTO: 5.5 % (ref 5–12)
MUCOUS THREADS URNS QL MICRO: ABNORMAL /HPF
NEUTROPHILS NFR BLD AUTO: 11.24 10*3/MM3 (ref 1.7–7)
NEUTROPHILS NFR BLD AUTO: 80.6 % (ref 42.7–76)
NITRITE UR QL STRIP: NEGATIVE
NRBC BLD AUTO-RTO: 0 /100 WBC (ref 0–0.2)
PH UR STRIP.AUTO: 7 [PH] (ref 5–9)
PLATELET # BLD AUTO: 259 10*3/MM3 (ref 140–450)
PMV BLD AUTO: 9.5 FL (ref 6–12)
POTASSIUM SERPL-SCNC: 3.8 MMOL/L (ref 3.5–5.2)
PROT SERPL-MCNC: 6.2 G/DL (ref 6–8.5)
PROT UR QL STRIP: ABNORMAL
RBC # BLD AUTO: 4.17 10*6/MM3 (ref 3.77–5.28)
RBC # UR STRIP: ABNORMAL /HPF
REF LAB TEST METHOD: ABNORMAL
SODIUM SERPL-SCNC: 136 MMOL/L (ref 136–145)
SP GR UR STRIP: 1.03 (ref 1–1.03)
SQUAMOUS #/AREA URNS HPF: ABNORMAL /HPF
UROBILINOGEN UR QL STRIP: ABNORMAL
WBC # UR STRIP: ABNORMAL /HPF
WBC NRBC COR # BLD: 13.94 10*3/MM3 (ref 3.4–10.8)

## 2022-08-29 PROCEDURE — 83735 ASSAY OF MAGNESIUM: CPT | Performed by: EMERGENCY MEDICINE

## 2022-08-29 PROCEDURE — 36415 COLL VENOUS BLD VENIPUNCTURE: CPT

## 2022-08-29 PROCEDURE — 84702 CHORIONIC GONADOTROPIN TEST: CPT | Performed by: EMERGENCY MEDICINE

## 2022-08-29 PROCEDURE — 81001 URINALYSIS AUTO W/SCOPE: CPT | Performed by: EMERGENCY MEDICINE

## 2022-08-29 PROCEDURE — 85025 COMPLETE CBC W/AUTO DIFF WBC: CPT | Performed by: EMERGENCY MEDICINE

## 2022-08-29 PROCEDURE — 80053 COMPREHEN METABOLIC PANEL: CPT | Performed by: EMERGENCY MEDICINE

## 2022-08-29 RX ORDER — ONDANSETRON 2 MG/ML
4 INJECTION INTRAMUSCULAR; INTRAVENOUS ONCE
Status: DISCONTINUED | OUTPATIENT
Start: 2022-08-29 | End: 2022-08-29 | Stop reason: HOSPADM

## 2022-08-29 RX ORDER — SODIUM CHLORIDE 0.9 % (FLUSH) 0.9 %
10 SYRINGE (ML) INJECTION AS NEEDED
Status: DISCONTINUED | OUTPATIENT
Start: 2022-08-29 | End: 2022-08-29 | Stop reason: HOSPADM

## 2022-08-29 RX ADMIN — SODIUM CHLORIDE 1000 ML: 9 INJECTION, SOLUTION INTRAVENOUS at 01:08

## 2022-08-29 NOTE — TELEPHONE ENCOUNTER
Patient called with concerns. States she was seen in the ER last night and got fluids for dehydration due to nausea and vomiting, but since being home she has been unable to keep down solids or liquids. States she has medication at home prescribed to her for nausea and vomiting but is unable to keep that down as well.    Let patient know I spoke with Dr Painting and she advises patient to take phenergan and zofran around the clock and instead of taking it orally to insert it vaginally and it will be absorbed that way. Also advised patient to drink lots of water and eat small snacks.    Patient verbalized understanding

## 2022-08-31 ENCOUNTER — TELEPHONE (OUTPATIENT)
Dept: OBSTETRICS AND GYNECOLOGY | Facility: CLINIC | Age: 22
End: 2022-08-31

## 2022-08-31 LAB
CANNABINOIDS UR QL CFM: NORMAL
CARBOXYTHC/CREAT UR: 423 NG/MG CREAT
LEVEL OF DETECTION:: NORMAL

## 2022-08-31 RX ORDER — PROMETHAZINE HYDROCHLORIDE 25 MG/1
25 TABLET ORAL EVERY 6 HOURS PRN
Qty: 30 TABLET | Refills: 1 | Status: ON HOLD | OUTPATIENT
Start: 2022-08-31 | End: 2022-09-16 | Stop reason: SDUPTHER

## 2022-08-31 RX ORDER — ONDANSETRON 4 MG/1
4 TABLET, ORALLY DISINTEGRATING ORAL 4 TIMES DAILY PRN
Qty: 12 TABLET | Refills: 3 | Status: SHIPPED | OUTPATIENT
Start: 2022-08-31 | End: 2022-09-14

## 2022-09-14 ENCOUNTER — HOSPITAL ENCOUNTER (OUTPATIENT)
Facility: HOSPITAL | Age: 22
Setting detail: OBSERVATION
Discharge: HOME | End: 2022-09-16
Attending: STUDENT IN AN ORGANIZED HEALTH CARE EDUCATION/TRAINING PROGRAM | Admitting: STUDENT IN AN ORGANIZED HEALTH CARE EDUCATION/TRAINING PROGRAM

## 2022-09-14 ENCOUNTER — INITIAL PRENATAL (OUTPATIENT)
Dept: OBSTETRICS AND GYNECOLOGY | Facility: CLINIC | Age: 22
End: 2022-09-14

## 2022-09-14 VITALS — WEIGHT: 170.4 LBS | BODY MASS INDEX: 29.25 KG/M2 | SYSTOLIC BLOOD PRESSURE: 120 MMHG | DIASTOLIC BLOOD PRESSURE: 62 MMHG

## 2022-09-14 DIAGNOSIS — O21.0 MILD HYPEREMESIS GRAVIDARUM: ICD-10-CM

## 2022-09-14 DIAGNOSIS — O99.321 DRUG DEPENDENCE AFFECTING PREGNANCY IN FIRST TRIMESTER: Primary | ICD-10-CM

## 2022-09-14 LAB
ABO GROUP BLD: NORMAL
ALBUMIN SERPL-MCNC: 4.2 G/DL (ref 3.5–5.2)
ALBUMIN/GLOB SERPL: 1.4 G/DL
ALP SERPL-CCNC: 66 U/L (ref 39–117)
ALT SERPL W P-5'-P-CCNC: 18 U/L (ref 1–33)
AMPHET+METHAMPHET UR QL: NEGATIVE
AMPHETAMINES UR QL: NEGATIVE
ANION GAP SERPL CALCULATED.3IONS-SCNC: 12 MMOL/L (ref 5–15)
AST SERPL-CCNC: 16 U/L (ref 1–32)
BARBITURATES UR QL SCN: NEGATIVE
BENZODIAZ UR QL SCN: NEGATIVE
BILIRUB SERPL-MCNC: 0.5 MG/DL (ref 0–1.2)
BLD GP AB SCN SERPL QL: NEGATIVE
BUN SERPL-MCNC: 6 MG/DL (ref 6–20)
BUN/CREAT SERPL: 14.6 (ref 7–25)
BUPRENORPHINE SERPL-MCNC: NEGATIVE NG/ML
CALCIUM SPEC-SCNC: 9.2 MG/DL (ref 8.6–10.5)
CANNABINOIDS SERPL QL: POSITIVE
CHLORIDE SERPL-SCNC: 101 MMOL/L (ref 98–107)
CO2 SERPL-SCNC: 22 MMOL/L (ref 22–29)
COCAINE UR QL: NEGATIVE
CREAT SERPL-MCNC: 0.41 MG/DL (ref 0.57–1)
DEPRECATED RDW RBC AUTO: 43.3 FL (ref 37–54)
EGFRCR SERPLBLD CKD-EPI 2021: 143.8 ML/MIN/1.73
ERYTHROCYTE [DISTWIDTH] IN BLOOD BY AUTOMATED COUNT: 14.6 % (ref 12.3–15.4)
GLOBULIN UR ELPH-MCNC: 3 GM/DL
GLUCOSE SERPL-MCNC: 81 MG/DL (ref 65–99)
HCT VFR BLD AUTO: 36.2 % (ref 34–46.6)
HGB BLD-MCNC: 12.3 G/DL (ref 12–15.9)
HOLD SPECIMEN: NORMAL
Lab: NORMAL
MCH RBC QN AUTO: 27.9 PG (ref 26.6–33)
MCHC RBC AUTO-ENTMCNC: 34 G/DL (ref 31.5–35.7)
MCV RBC AUTO: 82.1 FL (ref 79–97)
METHADONE UR QL SCN: NEGATIVE
OPIATES UR QL: NEGATIVE
OXYCODONE UR QL SCN: NEGATIVE
PCP UR QL SCN: NEGATIVE
PLATELET # BLD AUTO: 314 10*3/MM3 (ref 140–450)
PMV BLD AUTO: 9.5 FL (ref 6–12)
POTASSIUM SERPL-SCNC: 3.7 MMOL/L (ref 3.5–5.2)
PROPOXYPH UR QL: NEGATIVE
PROT SERPL-MCNC: 7.2 G/DL (ref 6–8.5)
RBC # BLD AUTO: 4.41 10*6/MM3 (ref 3.77–5.28)
RH BLD: POSITIVE
SODIUM SERPL-SCNC: 135 MMOL/L (ref 136–145)
T&S EXPIRATION DATE: NORMAL
TRICYCLICS UR QL SCN: NEGATIVE
WBC NRBC COR # BLD: 10.09 10*3/MM3 (ref 3.4–10.8)

## 2022-09-14 PROCEDURE — 96374 THER/PROPH/DIAG INJ IV PUSH: CPT

## 2022-09-14 PROCEDURE — 0501F PRENATAL FLOW SHEET: CPT | Performed by: STUDENT IN AN ORGANIZED HEALTH CARE EDUCATION/TRAINING PROGRAM

## 2022-09-14 PROCEDURE — 86900 BLOOD TYPING SEROLOGIC ABO: CPT | Performed by: STUDENT IN AN ORGANIZED HEALTH CARE EDUCATION/TRAINING PROGRAM

## 2022-09-14 PROCEDURE — 80306 DRUG TEST PRSMV INSTRMNT: CPT | Performed by: STUDENT IN AN ORGANIZED HEALTH CARE EDUCATION/TRAINING PROGRAM

## 2022-09-14 PROCEDURE — 86901 BLOOD TYPING SEROLOGIC RH(D): CPT | Performed by: STUDENT IN AN ORGANIZED HEALTH CARE EDUCATION/TRAINING PROGRAM

## 2022-09-14 PROCEDURE — G0379 DIRECT REFER HOSPITAL OBSERV: HCPCS

## 2022-09-14 PROCEDURE — 99219 PR INITIAL OBSERVATION CARE/DAY 50 MINUTES: CPT | Performed by: STUDENT IN AN ORGANIZED HEALTH CARE EDUCATION/TRAINING PROGRAM

## 2022-09-14 PROCEDURE — G0378 HOSPITAL OBSERVATION PER HR: HCPCS

## 2022-09-14 PROCEDURE — 25010000002 ONDANSETRON PER 1 MG: Performed by: STUDENT IN AN ORGANIZED HEALTH CARE EDUCATION/TRAINING PROGRAM

## 2022-09-14 PROCEDURE — 86850 RBC ANTIBODY SCREEN: CPT | Performed by: STUDENT IN AN ORGANIZED HEALTH CARE EDUCATION/TRAINING PROGRAM

## 2022-09-14 PROCEDURE — G0480 DRUG TEST DEF 1-7 CLASSES: HCPCS | Performed by: STUDENT IN AN ORGANIZED HEALTH CARE EDUCATION/TRAINING PROGRAM

## 2022-09-14 PROCEDURE — 85027 COMPLETE CBC AUTOMATED: CPT | Performed by: STUDENT IN AN ORGANIZED HEALTH CARE EDUCATION/TRAINING PROGRAM

## 2022-09-14 PROCEDURE — 80053 COMPREHEN METABOLIC PANEL: CPT | Performed by: STUDENT IN AN ORGANIZED HEALTH CARE EDUCATION/TRAINING PROGRAM

## 2022-09-14 RX ORDER — SODIUM CHLORIDE 0.9 % (FLUSH) 0.9 %
3 SYRINGE (ML) INJECTION EVERY 12 HOURS SCHEDULED
Status: DISCONTINUED | OUTPATIENT
Start: 2022-09-14 | End: 2022-09-16 | Stop reason: HOSPADM

## 2022-09-14 RX ORDER — SODIUM CHLORIDE 0.9 % (FLUSH) 0.9 %
3 SYRINGE (ML) INJECTION EVERY 12 HOURS SCHEDULED
Status: CANCELLED | OUTPATIENT
Start: 2022-09-14

## 2022-09-14 RX ORDER — LIDOCAINE HYDROCHLORIDE 10 MG/ML
5 INJECTION, SOLUTION EPIDURAL; INFILTRATION; INTRACAUDAL; PERINEURAL AS NEEDED
Status: DISCONTINUED | OUTPATIENT
Start: 2022-09-14 | End: 2022-09-16 | Stop reason: HOSPADM

## 2022-09-14 RX ORDER — METHYLPREDNISOLONE SODIUM SUCCINATE 40 MG/ML
16 INJECTION, POWDER, LYOPHILIZED, FOR SOLUTION INTRAMUSCULAR; INTRAVENOUS EVERY 8 HOURS
Status: DISCONTINUED | OUTPATIENT
Start: 2022-09-14 | End: 2022-09-16 | Stop reason: HOSPADM

## 2022-09-14 RX ORDER — SODIUM CHLORIDE, SODIUM LACTATE, POTASSIUM CHLORIDE, CALCIUM CHLORIDE 600; 310; 30; 20 MG/100ML; MG/100ML; MG/100ML; MG/100ML
125 INJECTION, SOLUTION INTRAVENOUS CONTINUOUS
Status: DISCONTINUED | OUTPATIENT
Start: 2022-09-14 | End: 2022-09-15

## 2022-09-14 RX ORDER — SODIUM CHLORIDE 0.9 % (FLUSH) 0.9 %
3-10 SYRINGE (ML) INJECTION AS NEEDED
Status: DISCONTINUED | OUTPATIENT
Start: 2022-09-14 | End: 2022-09-16 | Stop reason: HOSPADM

## 2022-09-14 RX ORDER — SODIUM CHLORIDE 0.9 % (FLUSH) 0.9 %
3-10 SYRINGE (ML) INJECTION AS NEEDED
Status: CANCELLED | OUTPATIENT
Start: 2022-09-14

## 2022-09-14 RX ORDER — METOCLOPRAMIDE HYDROCHLORIDE 5 MG/ML
10 INJECTION INTRAMUSCULAR; INTRAVENOUS EVERY 8 HOURS PRN
Status: DISCONTINUED | OUTPATIENT
Start: 2022-09-14 | End: 2022-09-15

## 2022-09-14 RX ORDER — PROMETHAZINE HYDROCHLORIDE 25 MG/1
25 SUPPOSITORY RECTAL EVERY 4 HOURS PRN
Status: DISCONTINUED | OUTPATIENT
Start: 2022-09-14 | End: 2022-09-16 | Stop reason: HOSPADM

## 2022-09-14 RX ORDER — ONDANSETRON 2 MG/ML
4 INJECTION INTRAMUSCULAR; INTRAVENOUS EVERY 6 HOURS PRN
Status: DISCONTINUED | OUTPATIENT
Start: 2022-09-14 | End: 2022-09-16 | Stop reason: HOSPADM

## 2022-09-14 RX ORDER — ONDANSETRON 2 MG/ML
4 INJECTION INTRAMUSCULAR; INTRAVENOUS EVERY 6 HOURS PRN
Status: CANCELLED | OUTPATIENT
Start: 2022-09-14

## 2022-09-14 RX ORDER — LIDOCAINE HYDROCHLORIDE 10 MG/ML
5 INJECTION, SOLUTION EPIDURAL; INFILTRATION; INTRACAUDAL; PERINEURAL AS NEEDED
Status: CANCELLED | OUTPATIENT
Start: 2022-09-14

## 2022-09-14 RX ADMIN — ONDANSETRON 4 MG: 2 INJECTION INTRAMUSCULAR; INTRAVENOUS at 16:35

## 2022-09-14 RX ADMIN — SODIUM CHLORIDE, POTASSIUM CHLORIDE, SODIUM LACTATE AND CALCIUM CHLORIDE 1000 ML: 600; 310; 30; 20 INJECTION, SOLUTION INTRAVENOUS at 16:21

## 2022-09-14 NOTE — H&P
HISTORY & PHYSICAL - Obstetrics  [facility]    Name: Moriah Nicole  MRN: [id number]  Location: M767/1  Date: 2022  CSN: [id number]      CHIEF COMPLAINT:  Hyperemesis gravidarum    HISTORY OF PRESENT ILLNESS  Moriah Nicole is a 22 y.o.  at 10w5d gestational age by L=1st TRI US suggesting Estimated Date of Delivery: 23.  The patient presents with a chief complaint of nausea and vomiting, not tolerating po.  Denies significant cramping in abdomen. Denies abnormal vaginal discharge, no VB.  Not yet feeling any FM secondary to GA.    Patient denies any chest pain, palpitations, headaches, lightheadedness, shortness of breath, cough, nausea, vomiting, diarrhea, constipation, fever, or chills.    ROS  Review of Systems   Constitutional: Negative.    HENT: Negative.    Respiratory: Negative.    Cardiovascular: Negative.    Gastrointestinal: Positive for nausea and vomiting.   Genitourinary: Negative.    Musculoskeletal: Negative.    Skin: Negative.    Psychiatric/Behavioral: Negative.        PRENATAL LAB RESULTS  Prenatal labs reviewed  External Prenatal Results     Pregnancy Outside Results - Transcribed From Office Records - See Scanned Records For Details     Test Value Date Time    ABO  O  22 1619    Rh  Positive  22 1619    Antibody Screen  Negative  22 1619       Negative  22 1517    Varicella IgG       Rubella  3.77 index 22 1517    Hgb  12.3 g/dL 22 1619       11.7 g/dL 22 0110       13.5 g/dL 22 1517       13.7 g/dL 22 1056       13.2 g/dL 22 1256    Hct  36.2 % 22 1619       33.8 % 22 0110       40.8 % 22 1517       40.7 % 22 1056       39.4 % 22 1256    Glucose Fasting GTT       Glucose Tolerance Test 1 hour       Glucose Tolerance Test 3 hour       Gonorrhea (discrete)  Negative  22 1517    Chlamydia (discrete)  Negative  22 1517    RPR  Non-Reactive  22  1517    VDRL       Syphilis Antibody       HBsAg  Non-Reactive  22 1517    Herpes Simplex Virus PCR       Herpes Simplex VIrus Culture       HIV  Non-Reactive  22 1517    Hep C RNA Quant PCR       Hep C Antibody  Non-Reactive  22 1517    AFP       Group B Strep  Negative  20 1357    GBS Susceptibility to Clindamycin       GBS Susceptibility to Erythromycin       Fetal Fibronectin       Genetic Testing, Maternal Blood             Drug Screening     Test Value Date Time    Urine Drug Screen       Amphetamine Screen  Negative  22 1618       Negative  22 1517    Barbiturate Screen  Negative  22 1618       Negative  22 1517    Benzodiazepine Screen  Negative  22 1618       Negative  22 1517    Methadone Screen  Negative  22 1618       Negative  22 1517    Phencyclidine Screen  Negative  22 1618       Negative  22 1517    Opiates Screen  Negative  22 1618       Negative  22 1517    THC Screen  Positive  22 1618       Positive  22 1517    Cocaine Screen       Propoxyphene Screen  Negative  22 1618       Negative  22 1517    Buprenorphine Screen  Negative  22 1618       Negative  22 1517    Methamphetamine Screen       Oxycodone Screen  Negative  22 1618       Negative  22 1517    Tricyclic Antidepressants Screen  Negative  22 1618       Negative  22 1517          Legend    ^: Historical                        PRENATAL RISK FACTORS   Problems (from 22 to present)     Problem Noted Resolved    Drug dependence affecting pregnancy in first trimester 2022 by Dai Painting MD No    Overview Signed 2022  4:11 PM by Dai Painting MD     +THC on NOB visit               DATING SUMMARY  LMP (22) -- TALIB 23  1 TRI US (22 at 6w4d) -- TALIB 23    OB HISTORY  OB History    Para Term  AB Living   2 1 1      1   SAB IAB Ectopic Molar Multiple Live Births           0 1      # Outcome Date GA Lbr Neo/2nd Weight Sex Delivery Anes PTL Lv   2 Current            1 Term 12/07/20 39w0d 04:28 / 02:13 2810 g (6 lb 3.1 oz) M Vag-Spont EPI N MARIO     GYN HISTORY  Denies h/o sexually transmitted infections/pelvic inflammatory disease  Denies h/o abnormal pap smears, last pap was needs pp pap  Denies h/o gynecologic surgeries, including biopsies of the cervix    PAST MEDICAL HISTORY  Past Medical History:   Diagnosis Date   • ADHD    • Allergic    • Anxiety    • Lumbar radiculopathy 11/15/2021   • Obesity    • Postural hypotension    • Urinary tract infection      PAST SURGICAL HISTORY  Past Surgical History:   Procedure Laterality Date   • LUMBAR DISCECTOMY Right 11/19/2021    Procedure: LUMBAR DISCECTOMY L4-5 RIGHT;  Surgeon: Bony Orellana MD;  Location: [facility];  Service: Neurosurgery;  Laterality: Right;     FAMILY HISTORY  Family History   Problem Relation Age of Onset   • Diabetes Mother    • Stroke Mother    • Fibromyalgia Mother    • Hypertension Father    • Heart attack Father    • Heart disease Father    • Schizophrenia Brother    • Bipolar disorder Brother    • No Known Problems Brother    • No Known Problems Son    • Hepatitis Maternal Grandmother    • Schizophrenia Maternal Grandmother    • Heart disease Paternal Grandmother    • Diabetes Paternal Grandmother    • Heart failure Paternal Grandmother    • Heart disease Paternal Grandfather    • Heart attack Paternal Grandfather      SOCIAL HISTORY  Social History     Socioeconomic History   • Marital status:    • Highest education level: High school graduate   Tobacco Use   • Smoking status: Current Every Day Smoker     Packs/day: 0.50     Types: Cigarettes   • Smokeless tobacco: Never Used   Vaping Use   • Vaping Use: Some days   • Substances: Nicotine, Flavoring   • Devices: Disposable, Pre-filled pod   Substance and Sexual Activity   • Alcohol use: Not  "Currently   • Drug use: Not Currently     Types: Marijuana   • Sexual activity: Yes     Partners: Male     Comment: unsure about last pap smear     ALLERGIES  No Known Allergies  HOME MEDICATIONS  Prior to Admission medications    Medication Sig Start Date End Date Taking? Authorizing Provider   doxylamine (UNISOM) 25 MG tablet Take 1 tablet by mouth At Night As Needed for Sleep. 22  Yes Susanna Mishra APRN   ondansetron (Zofran) 4 MG tablet Take 1 tablet by mouth Daily As Needed for Nausea or Vomiting. 22 Yes Susanna Mishra APRN   prenatal vitamin (prenatal, CLASSIC, vitamin) tablet Take  by mouth Daily.   Yes Provider, MD Colette   promethazine (PHENERGAN) 25 MG tablet Take 1 tablet by mouth Every 6 (Six) Hours As Needed for Nausea or Vomiting. 22  Yes Dai Painting MD   metoclopramide (REGLAN) 5 MG tablet Take 1 tablet by mouth 3 (Three) Times a Day As Needed (Nausea/Vomiting). 22  Vinod Lobato MD   ondansetron ODT (ZOFRAN-ODT) 4 MG disintegrating tablet Place 1 tablet on the tongue 4 (Four) Times a Day As Needed for Nausea or Vomiting. 22  Dai Painting MD     PHYSICAL EXAM  /64 (BP Location: Left arm, Patient Position: Lying)   Pulse 77   Temp 97.8 °F (36.6 °C) (Oral)   Resp 18   LMP 2022 (Exact Date)   SpO2 100%   General: No acute distress. Well developed, well nourished. Pleasant.  Heart: Regular rate and rhythm.   Lungs: no increased work of breathing  Abdomen: Soft, nontender to palpation    US: plan for Confirm OB tomorrow    IMPRESSION  Moriah Nicole is a 22 y.o.  at 10w5d admitted with hyperemesis of pregnancy.    PLAN  1. Hyperemesis gravidarum  - Patient states not tolerating any po  - 22 weight 176 pounds, patient states 185 pounds prior to pregnancy, weight today 170 pounds  - Trialed zofran po and ODT, Reglan, po phenergan; states was supposed to trial \"phenergan paste\" " but could not afford as was too expensive; had also been encouraged to trial zofran vaginally but stated she was worried it was unclean; reports no improvement in nausea with any meds  - Patient initially reported to resident she can tolerate up to 8 small cups of water a day and occasionally a strawberry smoothie from [employer], then stated not tolerating any po meds  - Discussed options outpatient management with trying to get zofran pump approved, po steroids, rectal phenergan vs. Admission for management if not tolerating any po; stated she could not tolerate any po and opted for admission  - Started on IV Zofran, IV Reglan, rectal phenergan, IV Dexamethasone  - 1 L IVF bolus, 125 cc/hr  - NPO with plan to transition to clears and advance as tolerated tomorrow    2.  IUP at 10w5d with hyperemesis of pregnancy  - Admit: Labor and Delivery  - Attending: Dr. Brock  - Condition: Stable  - Vitals: per protocol  - Activity: ad massimo  - Diet: NPO for now until improved nausea  - IV fluids: 1L bolus then  mL/hr  - Meds: see above  - Confirm OB US tomorrow          This document has been electronically signed by Gita Brock DO on September 14, 2022 18:17 CDT

## 2022-09-15 PROBLEM — O21.0 MILD HYPEREMESIS GRAVIDARUM: Status: ACTIVE | Noted: 2022-09-15

## 2022-09-15 PROCEDURE — G0378 HOSPITAL OBSERVATION PER HR: HCPCS

## 2022-09-15 PROCEDURE — 25010000002 METHYLPREDNISOLONE PER 40 MG: Performed by: STUDENT IN AN ORGANIZED HEALTH CARE EDUCATION/TRAINING PROGRAM

## 2022-09-15 PROCEDURE — 25010000002 METOCLOPRAMIDE PER 10 MG: Performed by: STUDENT IN AN ORGANIZED HEALTH CARE EDUCATION/TRAINING PROGRAM

## 2022-09-15 PROCEDURE — 99225 PR SBSQ OBSERVATION CARE/DAY 25 MINUTES: CPT | Performed by: STUDENT IN AN ORGANIZED HEALTH CARE EDUCATION/TRAINING PROGRAM

## 2022-09-15 PROCEDURE — 96375 TX/PRO/DX INJ NEW DRUG ADDON: CPT

## 2022-09-15 PROCEDURE — 96361 HYDRATE IV INFUSION ADD-ON: CPT

## 2022-09-15 PROCEDURE — 25010000002 ONDANSETRON PER 1 MG: Performed by: STUDENT IN AN ORGANIZED HEALTH CARE EDUCATION/TRAINING PROGRAM

## 2022-09-15 PROCEDURE — 96376 TX/PRO/DX INJ SAME DRUG ADON: CPT

## 2022-09-15 RX ORDER — METOCLOPRAMIDE 10 MG/1
10 TABLET ORAL
Status: DISCONTINUED | OUTPATIENT
Start: 2022-09-15 | End: 2022-09-16 | Stop reason: HOSPADM

## 2022-09-15 RX ORDER — ONDANSETRON 4 MG/1
4 TABLET, FILM COATED ORAL EVERY 6 HOURS PRN
Status: DISCONTINUED | OUTPATIENT
Start: 2022-09-15 | End: 2022-09-16 | Stop reason: HOSPADM

## 2022-09-15 RX ORDER — FAMOTIDINE 20 MG/1
20 TABLET, FILM COATED ORAL DAILY
Status: DISCONTINUED | OUTPATIENT
Start: 2022-09-15 | End: 2022-09-16 | Stop reason: HOSPADM

## 2022-09-15 RX ADMIN — Medication 3 ML: at 08:10

## 2022-09-15 RX ADMIN — METHYLPREDNISOLONE SODIUM SUCCINATE 16 MG: 40 INJECTION, POWDER, FOR SOLUTION INTRAMUSCULAR; INTRAVENOUS at 16:33

## 2022-09-15 RX ADMIN — SODIUM CHLORIDE, POTASSIUM CHLORIDE, SODIUM LACTATE AND CALCIUM CHLORIDE 125 ML/HR: 600; 310; 30; 20 INJECTION, SOLUTION INTRAVENOUS at 10:39

## 2022-09-15 RX ADMIN — METOCLOPRAMIDE 10 MG: 10 TABLET ORAL at 18:47

## 2022-09-15 RX ADMIN — METOCLOPRAMIDE 10 MG: 5 INJECTION, SOLUTION INTRAMUSCULAR; INTRAVENOUS at 02:31

## 2022-09-15 RX ADMIN — ONDANSETRON 4 MG: 2 INJECTION INTRAMUSCULAR; INTRAVENOUS at 11:10

## 2022-09-15 RX ADMIN — FAMOTIDINE 20 MG: 20 TABLET ORAL at 18:47

## 2022-09-15 RX ADMIN — METHYLPREDNISOLONE SODIUM SUCCINATE 16 MG: 40 INJECTION, POWDER, FOR SOLUTION INTRAMUSCULAR; INTRAVENOUS at 00:32

## 2022-09-15 RX ADMIN — METHYLPREDNISOLONE SODIUM SUCCINATE 16 MG: 40 INJECTION, POWDER, FOR SOLUTION INTRAMUSCULAR; INTRAVENOUS at 08:06

## 2022-09-16 ENCOUNTER — HOSPITAL ENCOUNTER (OUTPATIENT)
Facility: HOSPITAL | Age: 22
End: 2022-09-16
Attending: STUDENT IN AN ORGANIZED HEALTH CARE EDUCATION/TRAINING PROGRAM | Admitting: STUDENT IN AN ORGANIZED HEALTH CARE EDUCATION/TRAINING PROGRAM

## 2022-09-16 ENCOUNTER — APPOINTMENT (OUTPATIENT)
Dept: ULTRASOUND IMAGING | Facility: HOSPITAL | Age: 22
End: 2022-09-16

## 2022-09-16 VITALS
TEMPERATURE: 98.1 F | OXYGEN SATURATION: 99 % | SYSTOLIC BLOOD PRESSURE: 103 MMHG | HEART RATE: 67 BPM | DIASTOLIC BLOOD PRESSURE: 63 MMHG | RESPIRATION RATE: 16 BRPM

## 2022-09-16 PROCEDURE — 96376 TX/PRO/DX INJ SAME DRUG ADON: CPT

## 2022-09-16 PROCEDURE — 63710000001 ONDANSETRON PER 8 MG: Performed by: STUDENT IN AN ORGANIZED HEALTH CARE EDUCATION/TRAINING PROGRAM

## 2022-09-16 PROCEDURE — 25010000002 METHYLPREDNISOLONE PER 40 MG: Performed by: STUDENT IN AN ORGANIZED HEALTH CARE EDUCATION/TRAINING PROGRAM

## 2022-09-16 PROCEDURE — G0378 HOSPITAL OBSERVATION PER HR: HCPCS

## 2022-09-16 PROCEDURE — 76801 OB US < 14 WKS SINGLE FETUS: CPT

## 2022-09-16 PROCEDURE — 99217 PR OBSERVATION CARE DISCHARGE MANAGEMENT: CPT | Performed by: STUDENT IN AN ORGANIZED HEALTH CARE EDUCATION/TRAINING PROGRAM

## 2022-09-16 RX ORDER — METHYLPREDNISOLONE 4 MG/1
TABLET ORAL
Qty: 82 TABLET | Refills: 0 | Status: SHIPPED | OUTPATIENT
Start: 2022-09-16 | End: 2022-11-10

## 2022-09-16 RX ORDER — ONDANSETRON 4 MG/1
4 TABLET, FILM COATED ORAL EVERY 8 HOURS PRN
Qty: 30 TABLET | Refills: 1 | Status: ON HOLD | OUTPATIENT
Start: 2022-09-16 | End: 2023-03-27

## 2022-09-16 RX ORDER — METOCLOPRAMIDE 10 MG/1
10 TABLET ORAL
Qty: 90 TABLET | Refills: 3 | Status: SHIPPED | OUTPATIENT
Start: 2022-09-16 | End: 2022-11-10

## 2022-09-16 RX ORDER — PROMETHAZINE HYDROCHLORIDE 25 MG/1
25 TABLET ORAL EVERY 6 HOURS PRN
Qty: 30 TABLET | Refills: 1 | Status: SHIPPED | OUTPATIENT
Start: 2022-09-16 | End: 2022-11-10

## 2022-09-16 RX ORDER — PROMETHAZINE HYDROCHLORIDE 25 MG/1
25 SUPPOSITORY RECTAL EVERY 4 HOURS PRN
Qty: 8 SUPPOSITORY | Refills: 1 | Status: SHIPPED | OUTPATIENT
Start: 2022-09-16 | End: 2022-11-10

## 2022-09-16 RX ORDER — FAMOTIDINE 20 MG/1
20 TABLET, FILM COATED ORAL DAILY
Qty: 30 TABLET | Refills: 3 | Status: SHIPPED | OUTPATIENT
Start: 2022-09-17 | End: 2022-11-10

## 2022-09-16 RX ADMIN — Medication 3 ML: at 08:53

## 2022-09-16 RX ADMIN — METOCLOPRAMIDE 10 MG: 10 TABLET ORAL at 12:15

## 2022-09-16 RX ADMIN — ONDANSETRON HYDROCHLORIDE 4 MG: 4 TABLET, FILM COATED ORAL at 04:40

## 2022-09-16 RX ADMIN — METOCLOPRAMIDE 10 MG: 10 TABLET ORAL at 08:24

## 2022-09-16 RX ADMIN — METHYLPREDNISOLONE SODIUM SUCCINATE 16 MG: 40 INJECTION, POWDER, FOR SOLUTION INTRAMUSCULAR; INTRAVENOUS at 08:53

## 2022-09-16 RX ADMIN — METHYLPREDNISOLONE SODIUM SUCCINATE 16 MG: 40 INJECTION, POWDER, FOR SOLUTION INTRAMUSCULAR; INTRAVENOUS at 01:07

## 2022-09-16 RX ADMIN — FAMOTIDINE 20 MG: 20 TABLET ORAL at 08:24

## 2022-09-16 RX ADMIN — Medication 3 ML: at 01:10

## 2022-09-16 NOTE — PLAN OF CARE
Goal Outcome Evaluation:  Plan of Care Reviewed With: patient        Progress: improving  Outcome Evaluation: VSS, tolerated soft food, voids and ambulated in the room

## 2022-09-16 NOTE — PLAN OF CARE
Problem: Adult Inpatient Plan of Care  Goal: Plan of Care Review  Outcome: Ongoing, Progressing  Flowsheets  Taken 9/16/2022 0425 by Diane Brewster, RN  Outcome Evaluation: VSS, nausea but no emesis noted this shift. pt anticpates d/c 9/16  Taken 9/15/2022 1859 by Charles Parker, RN  Progress: improving  Plan of Care Reviewed With: patient   Goal Outcome Evaluation:              Outcome Evaluation: VSS, nausea but no emesis noted this shift. pt anticpates d/c 9/16

## 2022-09-16 NOTE — DISCHARGE INSTR - APPOINTMENTS
An after hours message has been sent to your provider's office. They should call you to schedule your follow-up appointment for next week. If you have not received a call in an appropriate amount of time, please call their office to schedule.

## 2022-09-16 NOTE — DISCHARGE SUMMARY
[facility]  Discharge Summary  Patient Name: Moriah Nicole  : 2000  MRN: [id number]  CSN: [id number]    Discharge Summary    Date of Admission: 2022   Date of Discharge: 2022   Discharge Diagnosis: Active Hospital Problems    Diagnosis  POA   • Mild hyperemesis gravidarum [O21.0]  Yes   • Hyperemesis arising during pregnancy [O21.0]  Yes      Procedures Performed: none      Hospital Course: Moriah Nicole is a 22 y.o.  who presented at 10+5 weeks with hyperemesis gravidarum, reporting not tolerating any po. She was made NPO and started on IV antiemetics and therapy, including IV zofran, IV reglan, IV Methylprednisolone, rectal phenergan not required. No emesis during admission. Did have intermittent nausea. On second hospital day she was slowly advanced to clears for breakfast, soft diet lunch, and regular diet for dinner. She continued to tolerate regular po and was discharged home on HD#3. She was discharged with po zofran, phenergan, reglan, famotidine, Methylprednisolone taper over 12 days. Discussed importance of taking meds scheduled, then can start to cut out medications.   Pending Studies:  Labs: none  Lab Results (last 72 hours)     Procedure Component Value Units Date/Time    Extra Tubes [[id number]] Collected: 22    Specimen: Blood, Venous Line Updated: 22    Narrative:      The following orders were created for panel order Extra Tubes.  Procedure                               Abnormality         Status                     ---------                               -----------         ------                     Red Top[[id number]]                                          Final result                 Please view results for these tests on the individual orders.    Red Top [[id number]] Collected: 22    Specimen: Blood Updated: 22     Extra Tube Hold for add-ons.     Comment: Auto resulted.       Comprehensive  Metabolic Panel [[id number]]  (Abnormal) Collected: 09/14/22 1619    Specimen: Blood Updated: 09/14/22 1650     Glucose 81 mg/dL      BUN 6 mg/dL      Creatinine 0.41 mg/dL      Sodium 135 mmol/L      Potassium 3.7 mmol/L      Chloride 101 mmol/L      CO2 22.0 mmol/L      Calcium 9.2 mg/dL      Total Protein 7.2 g/dL      Albumin 4.20 g/dL      ALT (SGPT) 18 U/L      AST (SGOT) 16 U/L      Alkaline Phosphatase 66 U/L      Total Bilirubin 0.5 mg/dL      Globulin 3.0 gm/dL      A/G Ratio 1.4 g/dL      BUN/Creatinine Ratio 14.6     Anion Gap 12.0 mmol/L      eGFR 143.8 mL/min/1.73      Comment: National Kidney Foundation and American Society of Nephrology (ASN) Task Force recommended calculation based on the Chronic Kidney Disease Epidemiology Collaboration (CKD-EPI) equation refit without adjustment for race.       Narrative:      GFR Normal >60  Chronic Kidney Disease <60  Kidney Failure <15      Urine Drug Screen - [[id number]]  (Abnormal) Collected: 09/14/22 1618    Specimen: Urine Updated: 09/14/22 1642     THC, Screen, Urine Positive     Phencyclidine (PCP), Urine Negative     Cocaine Screen, Urine Negative     Methamphetamine, Ur Negative     Opiate Screen Negative     Amphetamine Screen, Urine Negative     Benzodiazepine Screen, Urine Negative     Tricyclic Antidepressants Screen Negative     Methadone Screen, Urine Negative     Barbiturates Screen, Urine Negative     Oxycodone Screen, Urine Negative     Propoxyphene Screen Negative     Buprenorphine, Screen, Urine Negative    Narrative:      Cutoff For Drugs Screened:    Amphetamines               500 ng/ml  Barbiturates               200 ng/ml  Benzodiazepines            150 ng/ml  Cocaine                    150 ng/ml  Methadone                  200 ng/ml  Opiates                    100 ng/ml  Phencyclidine               25 ng/ml  THC                            50 ng/ml  Methamphetamine            500 ng/ml  Tricyclic Antidepressants  300 ng/ml  Oxycodone                   100 ng/ml  Propoxyphene               300 ng/ml  Buprenorphine               10 ng/ml    The normal value for all drugs tested is negative. This report includes unconfirmed screening results, with the cutoff values listed, to be used for medical treatment purposes only.  Unconfirmed results must not be used for non-medical purposes such as employment or legal testing.  Clinical consideration should be applied to any drug of abuse test, particularly when unconfirmed results are used.      Cannabinoids, Conf, MS, UR - [[id number]] Collected: 09/14/22 1618    Specimen: Urine Updated: 09/14/22 1642    CBC (No Diff) [[id number]]  (Normal) Collected: 09/14/22 1619    Specimen: Blood Updated: 09/14/22 1626     WBC 10.09 10*3/mm3      RBC 4.41 10*6/mm3      Hemoglobin 12.3 g/dL      Hematocrit 36.2 %      MCV 82.1 fL      MCH 27.9 pg      MCHC 34.0 g/dL      RDW 14.6 %      RDW-SD 43.3 fl      MPV 9.5 fL      Platelets 314 10*3/mm3          Condition at Discharge: Stable   Discharge Diet: Diet Instructions     Diet: Regular      Discharge Diet: Regular         Discharge Activity: Regular activity, no restrictions   Discharge Medications:    Your medication list      START taking these medications      Instructions Last Dose Given Next Dose Due   famotidine 20 MG tablet  Commonly known as: PEPCID  Start taking on: September 17, 2022      Take 1 tablet by mouth Daily.       methylPREDNISolone 4 MG tablet  Commonly known as: Medrol      Taper medication in the following fashion Today take 4 tablets with dinner. Starting tomorrow, Day 1: Take 4 tablets with breakfast, lunch and dinner; Day 2: Take 4 tablets with breakfast, 4 lunch, 3 dinner; Day 3: Take 4 tablets breakfast, 3 lunch, 3 dinner; Day 4: Take 3 tablets with breakfast, lunch, and dinner; Day 5: Take 3 tablets with breakfast, 3 lunch, 2 dinner; Day 6: Take 3 tablets with breakfast, 2 lunch, 2 dinner; Day 7: Take 2 tablets with breakfast, lunch, dinner; Day 8:  Take 2 tablets with breakfast, 2 lunch, 1 dinner; Day 9: Take 2 tablets with breakfast, 1 lunch, 1 dinner; Day 10: Take 1 tablets with breakfast, lunch, dinner; Day 11: Take 1 tablets with breakfast, 1 dinner; Day 12: Take 1 tablets with breakfast       metoclopramide 10 MG tablet  Commonly known as: REGLAN      Take 1 tablet by mouth 3 (Three) Times a Day Before Meals.          CHANGE how you take these medications      Instructions Last Dose Given Next Dose Due   doxylamine 25 MG tablet  Commonly known as: UNISOM  What changed:   · reasons to take this  · additional instructions      Take 1 tablet by mouth At Night As Needed for Nausea. Take every night to help with nausea.       ondansetron 4 MG tablet  Commonly known as: Zofran  What changed: when to take this      Take 1 tablet by mouth Every 8 (Eight) Hours As Needed for Nausea or Vomiting.       promethazine 25 MG tablet  Commonly known as: PHENERGAN  What changed: Another medication with the same name was added. Make sure you understand how and when to take each.      Take 1 tablet by mouth Every 6 (Six) Hours As Needed for Nausea or Vomiting.       promethazine 25 MG suppository  Commonly known as: PHENERGAN  What changed: You were already taking a medication with the same name, and this prescription was added. Make sure you understand how and when to take each.      Insert 1 suppository into the rectum Every 4 (Four) Hours As Needed for Nausea or Vomiting.          STOP taking these medications    prenatal (CLASSIC) vitamin  tablet  Generic drug: prenatal vitamin              Where to Get Your Medications      These medications were sent to [place], [place] - [address] [facility] [phone] [facility] [phone] [facility] [facility] [address]    Phone: [phone] ·   doxylamine 25 MG tablet  · famotidine 20 MG tablet  · methylPREDNISolone 4 MG tablet  · metoclopramide 10 MG tablet  · ondansetron 4 MG tablet  · promethazine 25 MG  suppository  · promethazine 25 MG tablet        Discharge Disposition: Home   Follow-up: No future appointments.       This document has been electronically signed by Gita Brock DO on September 17, 2022 08:56 CDT

## 2022-09-17 NOTE — PROGRESS NOTES
[facility]  Progress Note - Antepartum    Name: Moriah Nicole  MRN: [id number]  Location: M767/1   Date: 9/17/2022  CSN: [id number]     HD #2 - Admitted at 10w5d for hyperemesis gravidarum    SUBJECTIVE  Patient seen and examined.  Patient has no complaints.  Denies any contractions, LOF, or VB.  Reports good FM.  Denies any fevers, chills, chest pain, SOB, abdominal pain, or calf pain.    OBJECTIVE  /63 (BP Location: Right arm, Patient Position: Sitting)   Pulse 67   Temp 98.1 °F (36.7 °C) (Oral)   Resp 16   LMP 07/01/2022 (Exact Date)   SpO2 99%   Gen: NAD, AAO x3, pleasant  CV: RRR  Lungs: no increased work of breathing  Abd: Soft, nontender  Ext: No edema    LABS  WBC   Date Value Ref Range Status   09/14/2022 10.09 3.40 - 10.80 10*3/mm3 Final     RBC   Date Value Ref Range Status   09/14/2022 4.41 3.77 - 5.28 10*6/mm3 Final     Hemoglobin   Date Value Ref Range Status   09/14/2022 12.3 12.0 - 15.9 g/dL Final     Hematocrit   Date Value Ref Range Status   09/14/2022 36.2 34.0 - 46.6 % Final     MCV   Date Value Ref Range Status   09/14/2022 82.1 79.0 - 97.0 fL Final     MCH   Date Value Ref Range Status   09/14/2022 27.9 26.6 - 33.0 pg Final     MCHC   Date Value Ref Range Status   09/14/2022 34.0 31.5 - 35.7 g/dL Final     RDW   Date Value Ref Range Status   09/14/2022 14.6 12.3 - 15.4 % Final     RDW-SD   Date Value Ref Range Status   09/14/2022 43.3 37.0 - 54.0 fl Final     MPV   Date Value Ref Range Status   09/14/2022 9.5 6.0 - 12.0 fL Final     Platelets   Date Value Ref Range Status   09/14/2022 314 140 - 450 10*3/mm3 Final     Lab Results   Component Value Date    GLUCOSE 81 09/14/2022    BUN 6 09/14/2022    CREATININE 0.41 (L) 09/14/2022    EGFRIFNONA 103 11/21/2021    BCR 14.6 09/14/2022    K 3.7 09/14/2022    CO2 22.0 09/14/2022    CALCIUM 9.2 09/14/2022    ALBUMIN 4.20 09/14/2022    AST 16 09/14/2022    ALT 18 09/14/2022     IMAGING  Plan for confirm OB  tomorrow    ASSESSMENT  Moriah Nicole is 22 y.o.  at 10w6d admitted for hyperemesis gravidarum.    PLAN  1.  Hyperemesis Gravidarum  - Diet: Pregnancy/breastfeeding  - Vital signs: VSS  - IVF: LR at 125 cc/hr, will decrease as diet advanced  - Diet: has been NPO, will advance as tolerated, start with clears  - Lines: IV  - Activity: ad massimo  - Currently on IV zofran, reglan, Methylprednisolone, rectal phenergan if needed, no emesis overnight, as advance diet will change meds to po    2.  IUP at 10w6d  - Tdap at 28 weeks  - Glucola at 28 weeks  - Next US: tomorrow for confirm OB    Consider DC home tomorrow if continues to do well        This document has been electronically signed by Gita Borck DO on 2022 08:57 CDT

## 2022-09-20 ENCOUNTER — LAB (OUTPATIENT)
Dept: LAB | Facility: HOSPITAL | Age: 22
End: 2022-09-20

## 2022-09-20 DIAGNOSIS — Z13.79 GENETIC TESTING: ICD-10-CM

## 2022-09-20 DIAGNOSIS — Z13.79 GENETIC TESTING: Primary | ICD-10-CM

## 2022-09-22 LAB
CANNABINOIDS UR QL CFM: NORMAL
CARBOXYTHC/CREAT UR: 402 NG/MG CREAT
LEVEL OF DETECTION:: NORMAL

## 2022-10-04 ENCOUNTER — HOSPITAL ENCOUNTER (EMERGENCY)
Facility: HOSPITAL | Age: 22
Discharge: HOME | End: 2022-10-04
Attending: STUDENT IN AN ORGANIZED HEALTH CARE EDUCATION/TRAINING PROGRAM | Admitting: STUDENT IN AN ORGANIZED HEALTH CARE EDUCATION/TRAINING PROGRAM

## 2022-10-04 VITALS
TEMPERATURE: 98.1 F | DIASTOLIC BLOOD PRESSURE: 65 MMHG | RESPIRATION RATE: 18 BRPM | HEIGHT: 64 IN | WEIGHT: 171.9 LBS | SYSTOLIC BLOOD PRESSURE: 110 MMHG | OXYGEN SATURATION: 98 % | HEART RATE: 84 BPM | BODY MASS INDEX: 29.35 KG/M2

## 2022-10-04 DIAGNOSIS — R11.2 NAUSEA AND VOMITING, UNSPECIFIED VOMITING TYPE: Primary | ICD-10-CM

## 2022-10-04 DIAGNOSIS — E86.0 DEHYDRATION: ICD-10-CM

## 2022-10-04 DIAGNOSIS — Z3A.13 13 WEEKS GESTATION OF PREGNANCY: ICD-10-CM

## 2022-10-04 LAB
BACTERIA UR QL AUTO: ABNORMAL /HPF
BILIRUB UR QL STRIP: NEGATIVE
CLARITY UR: CLEAR
COLOR UR: YELLOW
GLUCOSE UR STRIP-MCNC: NEGATIVE MG/DL
HGB UR QL STRIP.AUTO: NEGATIVE
HYALINE CASTS UR QL AUTO: ABNORMAL /LPF
KETONES UR QL STRIP: ABNORMAL
LEUKOCYTE ESTERASE UR QL STRIP.AUTO: ABNORMAL
NITRITE UR QL STRIP: NEGATIVE
PH UR STRIP.AUTO: 6.5 [PH] (ref 5–9)
PROT UR QL STRIP: NEGATIVE
RBC # UR STRIP: ABNORMAL /HPF
REF LAB TEST METHOD: ABNORMAL
SP GR UR STRIP: 1.02 (ref 1–1.03)
SQUAMOUS #/AREA URNS HPF: ABNORMAL /HPF
UROBILINOGEN UR QL STRIP: ABNORMAL
WBC # UR STRIP: ABNORMAL /HPF

## 2022-10-04 PROCEDURE — 81001 URINALYSIS AUTO W/SCOPE: CPT | Performed by: STUDENT IN AN ORGANIZED HEALTH CARE EDUCATION/TRAINING PROGRAM

## 2022-10-04 PROCEDURE — 99283 EMERGENCY DEPT VISIT LOW MDM: CPT

## 2022-10-04 NOTE — ED TRIAGE NOTES
Pt state she vomited blood one time about 15 minutes pta, she denies any pain. States she just feels off today, pt is about 12 weeks pregnant, this is pt's second pregnancy with one prior  Vaginal live birth.  current OBGYN Gita Brock MD

## 2022-10-05 NOTE — ED PROVIDER NOTES
Subjective   History of Present Illness  Patient presents with blood-streaked emesis today.  Patient is 13 weeks 5 days pregnant and last month was hospitalized for hyperemesis gravidarum.  She has not vomited much since but did vomit an hour ago and had some  3 weeks blood streaking with bile emesis.  She denies persistent nausea, chest pain or abdominal pain at this time.        Review of Systems   Constitutional: Negative for activity change and appetite change.   HENT: Negative for congestion and ear pain.    Eyes: Negative for pain and discharge.   Respiratory: Negative for chest tightness and shortness of breath.    Cardiovascular: Negative for chest pain and palpitations.   Gastrointestinal: Positive for nausea (mild). Negative for abdominal distention and abdominal pain.        Blood streaked emesis   Endocrine: Negative for cold intolerance and heat intolerance.   Genitourinary: Negative for difficulty urinating and dysuria.   Musculoskeletal: Negative for arthralgias and back pain.   Skin: Negative for color change and rash.   Allergic/Immunologic: Negative for environmental allergies and food allergies.   Neurological: Negative for dizziness and headaches.   Hematological: Negative for adenopathy. Does not bruise/bleed easily.   Psychiatric/Behavioral: Negative for agitation and confusion.       Past Medical History:   Diagnosis Date   • ADHD    • Allergic    • Anxiety    • Lumbar radiculopathy 11/15/2021   • Obesity    • Postural hypotension    • Urinary tract infection        No Known Allergies    Past Surgical History:   Procedure Laterality Date   • LUMBAR DISCECTOMY Right 11/19/2021    Procedure: LUMBAR DISCECTOMY L4-5 RIGHT;  Surgeon: Bony Orellana MD;  Location: [facility];  Service: Neurosurgery;  Laterality: Right;       Family History   Problem Relation Age of Onset   • Diabetes Mother    • Stroke Mother    • Fibromyalgia Mother    • Hypertension Father    • Heart attack Father    • Heart  disease Father    • Schizophrenia Brother    • Bipolar disorder Brother    • No Known Problems Brother    • No Known Problems Son    • Hepatitis Maternal Grandmother    • Schizophrenia Maternal Grandmother    • Heart disease Paternal Grandmother    • Diabetes Paternal Grandmother    • Heart failure Paternal Grandmother    • Heart disease Paternal Grandfather    • Heart attack Paternal Grandfather        Social History     Socioeconomic History   • Marital status:    • Highest education level: High school graduate   Tobacco Use   • Smoking status: Current Every Day Smoker     Packs/day: 0.50     Types: Cigarettes   • Smokeless tobacco: Never Used   Vaping Use   • Vaping Use: Some days   • Substances: Nicotine, Flavoring   • Devices: Disposable, Pre-filled pod   Substance and Sexual Activity   • Alcohol use: Not Currently   • Drug use: Not Currently     Types: Marijuana   • Sexual activity: Yes     Partners: Male     Comment: unsure about last pap smear           Objective   Physical Exam  Vitals and nursing note reviewed.   Constitutional:       Appearance: She is well-developed.   HENT:      Head: Normocephalic and atraumatic.   Eyes:      Pupils: Pupils are equal, round, and reactive to light.   Neck:      Thyroid: No thyromegaly.      Vascular: No JVD.      Trachea: No tracheal deviation.   Cardiovascular:      Rate and Rhythm: Normal rate.      Pulses:           Radial pulses are 2+ on the right side and 2+ on the left side.        Dorsalis pedis pulses are 2+ on the right side and 2+ on the left side.      Heart sounds: Normal heart sounds, S1 normal and S2 normal.   Pulmonary:      Effort: Pulmonary effort is normal.      Breath sounds: Normal breath sounds.   Abdominal:      General: Bowel sounds are normal.      Comments: Cardiac activity noted with fetal movement on bedside ultrasound.   Musculoskeletal:         General: Normal range of motion.   Skin:     General: Skin is warm and dry.      Capillary  "Refill: Capillary refill takes 2 to 3 seconds.   Neurological:      Mental Status: She is alert and oriented to person, place, and time.      GCS: GCS eye subscore is 4. GCS verbal subscore is 5. GCS motor subscore is 6.   Psychiatric:         Speech: Speech normal.         Behavior: Behavior normal.         Thought Content: Thought content normal.         Procedures           ED Course      Vitals:    10/04/22 1755 10/04/22 1929   BP: 104/68 110/65   BP Location:  Left arm   Patient Position:  Sitting   Pulse: 91 84   Resp: 18 18   Temp: 98.1 °F (36.7 °C)    TempSrc: Oral    SpO2: 100% 98%   Weight: 78 kg (171 lb 14.4 oz)    Height: 162.6 cm (64\")      Lab Results (last 24 hours)     Procedure Component Value Units Date/Time    Urinalysis With Microscopic If Indicated (No Culture) - Urine, Clean Catch [[id number]]  (Abnormal) Collected: 10/04/22 1926    Specimen: Urine, Clean Catch Updated: 10/04/22 1936     Color, UA Yellow     Appearance, UA Clear     pH, UA 6.5     Specific Gravity, UA 1.023     Glucose, UA Negative     Ketones, UA >=160 mg/dL (4+)     Bilirubin, UA Negative     Blood, UA Negative     Protein, UA Negative     Leuk Esterase, UA Trace     Nitrite, UA Negative     Urobilinogen, UA 1.0 E.U./dL    Urinalysis, Microscopic Only - Urine, Clean Catch [[id number]]  (Abnormal) Collected: 10/04/22 1926    Specimen: Urine, Clean Catch Updated: 10/04/22 1936     RBC, UA 0-2 /HPF      WBC, UA 3-5 /HPF      Bacteria, UA None Seen /HPF      Squamous Epithelial Cells, UA 6-12 /HPF      Hyaline Casts, UA 0-2 /LPF      Methodology Automated Microscopy        No radiology results for the last day                                       MDM  Number of Diagnoses or Management Options  13 weeks gestation of pregnancy  Dehydration  Nausea and vomiting, unspecified vomiting type  Diagnosis management comments: Patient with no active vomiting and low risk for GI or esophageal hemorrhage given history of blood-streaked " vomiting.  Ultrasound visualization of fetus reveals heartbeat and movement.  Multiple glasses of water given without vomiting.  Urinalysis negative for infection.  Patient to follow-up with OB and residents.       Amount and/or Complexity of Data Reviewed  Clinical lab tests: ordered and reviewed    Patient Progress  Patient progress: stable      Final diagnoses:   Nausea and vomiting, unspecified vomiting type   Dehydration   13 weeks gestation of pregnancy       ED Disposition  ED Disposition     ED Disposition   Discharge    Condition   Stable    Comment   --             [facility]  [address] [place] [address] [phone]  Call in 1 day  to establish care and follow up         Medication List      No changes were made to your prescriptions during this visit.       This document has been electronically signed by Claudy Jacob MD on October 4, 2022 20:15 CDT    Claudy Jacob MD   Part of this note may be an electronic transcription/translation of spoken language to printed text using the Dragon Dictation System.        Claudy Jacob MD  10/04/22 2015

## 2022-10-05 NOTE — DISCHARGE INSTRUCTIONS
Increase your fluid intake.  Your urinalysis shows you are a little dehydrated.  Call your PCP tomorrow for follow-up.  I have listed the residents for you to establish with primary care if you do not have one.  Return to ED with any worsening or concerning symptoms.

## 2022-10-11 ENCOUNTER — TELEPHONE (OUTPATIENT)
Dept: OBSTETRICS AND GYNECOLOGY | Facility: CLINIC | Age: 22
End: 2022-10-11

## 2022-10-28 ENCOUNTER — REFERRAL TRIAGE (OUTPATIENT)
Dept: LABOR AND DELIVERY | Facility: HOSPITAL | Age: 22
End: 2022-10-28

## 2022-11-10 ENCOUNTER — ROUTINE PRENATAL (OUTPATIENT)
Dept: OBSTETRICS AND GYNECOLOGY | Facility: CLINIC | Age: 22
End: 2022-11-10

## 2022-11-10 VITALS — DIASTOLIC BLOOD PRESSURE: 60 MMHG | WEIGHT: 173 LBS | SYSTOLIC BLOOD PRESSURE: 100 MMHG | BODY MASS INDEX: 29.7 KG/M2

## 2022-11-10 DIAGNOSIS — O99.321 DRUG DEPENDENCE AFFECTING PREGNANCY IN FIRST TRIMESTER: ICD-10-CM

## 2022-11-10 DIAGNOSIS — Z34.82 MULTIGRAVIDA IN SECOND TRIMESTER: ICD-10-CM

## 2022-11-10 DIAGNOSIS — Z36.3 ENCOUNTER FOR ROUTINE SCREENING FOR FETAL MALFORMATION USING ULTRASOUND: ICD-10-CM

## 2022-11-10 DIAGNOSIS — Z3A.18 18 WEEKS GESTATION OF PREGNANCY: Primary | ICD-10-CM

## 2022-11-10 DIAGNOSIS — O21.9 NAUSEA AND VOMITING DURING PREGNANCY PRIOR TO 22 WEEKS GESTATION: ICD-10-CM

## 2022-11-10 PROCEDURE — 0502F SUBSEQUENT PRENATAL CARE: CPT | Performed by: NURSE PRACTITIONER

## 2022-11-10 RX ORDER — PNV NO.95/FERROUS FUM/FOLIC AC 28MG-0.8MG
1 TABLET ORAL DAILY
COMMUNITY

## 2022-11-10 NOTE — PROGRESS NOTES
CC: Prenatal visit    Moriah Nicole is a 22 y.o.  at 18w6d.  Doing well.  No complaints.  She has missed a couple of appointments.    They recently had several gas leaks in the house which left family feeling unwell.  The gas leaks have been fixed and everyone is feeling better. Nausea and vomiting is occurring much less frequently. Denies contractions, LOF, or VB.  Reports good FM.    /60   Wt 78.5 kg (173 lb)   LMP 2022 (Exact Date)   BMI 29.70 kg/m²              Problems (from 22 to present)     Problem Noted Resolved    Drug dependence affecting pregnancy in first trimester 2022 by Dai Painting MD No    Overview Signed 2022  4:11 PM by Dai Painting MD     +THC on NOB visit               A/P: Moriah Nicole is a 22 y.o.  at 18w6d.  - RTC in 2-3 weeks for DIMAS appt and fetal anatomy scan, arrive 15 minutes before US time on paper     Diagnosis Plan   1. 18 weeks gestation of pregnancy        2. Multigravida in second trimester        3. Drug dependence affecting pregnancy in first trimester        4. Encounter for routine screening for fetal malformation using ultrasound  US ob detail fetal anatomy single or first gestation      5. Nausea and vomiting during pregnancy prior to 22 weeks gestation              DAVID Gonzales  11/10/2022  09:06 CST

## 2022-11-15 ENCOUNTER — PATIENT OUTREACH (OUTPATIENT)
Dept: LABOR AND DELIVERY | Facility: HOSPITAL | Age: 22
End: 2022-11-15

## 2022-11-15 NOTE — OUTREACH NOTE
[facility]  Enrollment    Current Estimated Gestational Age: 19w4d    Questions/Answers    Flowsheet Row Responses   Would like to participate? Yes   Date of Intake Visit 11/30/22              Criselda Tran RN  Maternity Nurse Navigator    11/15/2022, 13:58 CST

## 2022-11-16 ENCOUNTER — HOSPITAL ENCOUNTER (EMERGENCY)
Facility: HOSPITAL | Age: 22
Discharge: HOME | End: 2022-11-16
Attending: EMERGENCY MEDICINE | Admitting: EMERGENCY MEDICINE

## 2022-11-16 VITALS
HEIGHT: 63 IN | TEMPERATURE: 98.3 F | RESPIRATION RATE: 20 BRPM | WEIGHT: 176.8 LBS | OXYGEN SATURATION: 100 % | SYSTOLIC BLOOD PRESSURE: 118 MMHG | HEART RATE: 75 BPM | BODY MASS INDEX: 31.33 KG/M2 | DIASTOLIC BLOOD PRESSURE: 76 MMHG

## 2022-11-16 DIAGNOSIS — K08.89 PAIN, DENTAL: Primary | ICD-10-CM

## 2022-11-16 PROCEDURE — 99283 EMERGENCY DEPT VISIT LOW MDM: CPT

## 2022-11-16 RX ORDER — AMOXICILLIN 500 MG/1
500 CAPSULE ORAL 3 TIMES DAILY
Qty: 15 CAPSULE | Refills: 0 | Status: SHIPPED | OUTPATIENT
Start: 2022-11-16 | End: 2022-11-21

## 2022-11-16 RX ORDER — CHLORHEXIDINE GLUCONATE 0.12 MG/ML
15 RINSE ORAL 2 TIMES DAILY
Qty: 473 ML | Refills: 0 | Status: SHIPPED | OUTPATIENT
Start: 2022-11-16 | End: 2023-01-24

## 2022-11-16 RX ORDER — HYDROCODONE BITARTRATE AND ACETAMINOPHEN 5; 325 MG/1; MG/1
1 TABLET ORAL EVERY 6 HOURS PRN
Qty: 12 TABLET | Refills: 0 | Status: SHIPPED | OUTPATIENT
Start: 2022-11-16 | End: 2022-12-01

## 2022-11-16 RX ORDER — HYDROCODONE BITARTRATE AND ACETAMINOPHEN 5; 325 MG/1; MG/1
1 TABLET ORAL ONCE
Status: COMPLETED | OUTPATIENT
Start: 2022-11-16 | End: 2022-11-16

## 2022-11-16 RX ORDER — AMOXICILLIN 500 MG/1
1000 CAPSULE ORAL ONCE
Status: COMPLETED | OUTPATIENT
Start: 2022-11-16 | End: 2022-11-16

## 2022-11-16 RX ADMIN — AMOXICILLIN 1000 MG: 500 CAPSULE ORAL at 10:23

## 2022-11-16 RX ADMIN — HYDROCODONE BITARTRATE AND ACETAMINOPHEN 1 TABLET: 5; 325 TABLET ORAL at 10:23

## 2022-11-16 NOTE — ED PROVIDER NOTES
Subjective   History of Present Illness  Patient presents to the ER with c/o left lower dental pain. She states several months ago she thinks a wisdom tooth started coming through. About 3 weeks ago she started having pain at the site. She called a dentist and scheduled an appointment but states the earliest she could schedule was at the end of January. She reports the pain has increased over the past several weeks. She has been using Tylenol without much relief. Last dose at 0700. She states at midnight the pain worsened and she has been unable to sleep. Patient is currently 20 weeks pregnant. Pain is 10/10.         Review of Systems   Constitutional: Negative for fever.   HENT: Positive for dental problem. Negative for facial swelling.        Past Medical History:   Diagnosis Date   • ADHD    • Allergic    • Anxiety    • Lumbar radiculopathy 11/15/2021   • Obesity    • Postural hypotension    • Urinary tract infection        No Known Allergies    Past Surgical History:   Procedure Laterality Date   • LUMBAR DISCECTOMY Right 11/19/2021    Procedure: LUMBAR DISCECTOMY L4-5 RIGHT;  Surgeon: Bony Orellana MD;  Location: [facility];  Service: Neurosurgery;  Laterality: Right;       Family History   Problem Relation Age of Onset   • Diabetes Mother    • Stroke Mother    • Fibromyalgia Mother    • Hypertension Father    • Heart attack Father    • Heart disease Father    • Schizophrenia Brother    • Bipolar disorder Brother    • No Known Problems Brother    • No Known Problems Son    • Hepatitis Maternal Grandmother    • Schizophrenia Maternal Grandmother    • Heart disease Paternal Grandmother    • Diabetes Paternal Grandmother    • Heart failure Paternal Grandmother    • Heart disease Paternal Grandfather    • Heart attack Paternal Grandfather        Social History     Socioeconomic History   • Marital status:    • Highest education level: High school graduate   Tobacco Use   • Smoking status: Every Day      "Packs/day: 0.50     Types: Cigarettes   • Smokeless tobacco: Never   Vaping Use   • Vaping Use: Some days   • Substances: Nicotine, Flavoring   • Devices: Disposable, Pre-filled pod   Substance and Sexual Activity   • Alcohol use: Not Currently   • Drug use: Not Currently     Types: Marijuana   • Sexual activity: Yes     Partners: Male     Comment: unsure about last pap smear           Objective    /74 (BP Location: Right arm, Patient Position: Sitting)   Pulse 70   Temp 98.3 °F (36.8 °C) (Oral)   Resp 20   Ht 160 cm (63\")   Wt 80.2 kg (176 lb 12.8 oz)   LMP 07/01/2022 (Exact Date)   SpO2 95%   BMI 31.32 kg/m²     Physical Exam  Vitals and nursing note reviewed.   Constitutional:       General: She is not in acute distress.     Appearance: She is well-developed. She is not ill-appearing.      Comments: Tearful at this time.    HENT:      Head: Normocephalic and atraumatic.      Mouth/Throat:      Mouth: Mucous membranes are moist.      Pharynx: Oropharynx is clear. No oropharyngeal exudate.        Comments: Left back molar/wisdom tooth appears to becoming in sideways. There is localized redness and swelling at the gum. No facial swelling.   Cardiovascular:      Rate and Rhythm: Normal rate.   Pulmonary:      Effort: Pulmonary effort is normal.   Abdominal:      General: Bowel sounds are normal. There is no distension.      Palpations: Abdomen is soft.      Tenderness: There is no abdominal tenderness.   Musculoskeletal:         General: Normal range of motion.      Cervical back: Normal range of motion.   Skin:     General: Skin is warm and dry.      Capillary Refill: Capillary refill takes less than 2 seconds.   Neurological:      Mental Status: She is alert and oriented to person, place, and time.      Coordination: Coordination normal.   Psychiatric:         Behavior: Behavior normal.         Thought Content: Thought content normal.         Judgment: Judgment normal.         Procedures           ED " Course  ED Course as of 11/16/22 1013   Wed Nov 16, 2022   0959 Patient on the phone with [facility] while in the room. Appointment scheduled for Tuesday November 11 at 9:30. Will treat with Peridex, antibiotics, and pain medication at this time. Plan discussed with patient and patient verbalized understanding.  []   1010 [id number] [facility] report reviewed.  []      ED Course User Index  [] Lindsey Riggs APRN                                           [facility]    Final diagnoses:   Pain, dental       ED Disposition  ED Disposition     ED Disposition   Discharge    Condition   Stable    Comment   --             [place] [address], [place]  [phone]    Tuesday November 22 at 9:30         Medication List      New Prescriptions    amoxicillin 500 MG capsule  Commonly known as: AMOXIL  Take 1 capsule by mouth 3 (Three) Times a Day for 5 days.     chlorhexidine 0.12 % solution  Commonly known as: PERIDEX  Apply 15 mL to the mouth or throat 2 (Two) Times a Day. Swish for 30 seconds and spit     HYDROcodone-acetaminophen 5-325 MG per tablet  Commonly known as: NORCO  Take 1 tablet by mouth Every 6 (Six) Hours As Needed for Moderate Pain.           Where to Get Your Medications      These medications were sent to [place], [place] - [facility] [phone] [facility] [phone] [address] [address]    Phone: [phone]   · amoxicillin 500 MG capsule  · chlorhexidine 0.12 % solution  · HYDROcodone-acetaminophen 5-325 MG per tablet          Lindsey Riggs APRN  11/16/22 0195

## 2022-11-16 NOTE — DISCHARGE INSTRUCTIONS
Fill your prescription and take as directed. Follow up with [facility] as scheduled. Call them back with your 's information as requested by them. Return back to the ER if symptoms worsen or change in presentation.

## 2022-11-22 ENCOUNTER — HOSPITAL ENCOUNTER (EMERGENCY)
Facility: HOSPITAL | Age: 22
Discharge: HOME | End: 2022-11-22
Attending: EMERGENCY MEDICINE | Admitting: STUDENT IN AN ORGANIZED HEALTH CARE EDUCATION/TRAINING PROGRAM

## 2022-11-22 VITALS
RESPIRATION RATE: 20 BRPM | HEART RATE: 83 BPM | OXYGEN SATURATION: 100 % | WEIGHT: 176.7 LBS | TEMPERATURE: 98.2 F | DIASTOLIC BLOOD PRESSURE: 69 MMHG | SYSTOLIC BLOOD PRESSURE: 105 MMHG | HEIGHT: 64 IN | BODY MASS INDEX: 30.17 KG/M2

## 2022-11-22 DIAGNOSIS — K08.89 PAIN, DENTAL: Primary | ICD-10-CM

## 2022-11-22 PROCEDURE — 99283 EMERGENCY DEPT VISIT LOW MDM: CPT

## 2022-11-22 RX ORDER — ALUMINA, MAGNESIA, AND SIMETHICONE 2400; 2400; 240 MG/30ML; MG/30ML; MG/30ML
10 SUSPENSION ORAL ONCE
Status: COMPLETED | OUTPATIENT
Start: 2022-11-22 | End: 2022-11-22

## 2022-11-22 RX ORDER — LIDOCAINE HYDROCHLORIDE 20 MG/ML
10 SOLUTION OROPHARYNGEAL ONCE
Status: COMPLETED | OUTPATIENT
Start: 2022-11-22 | End: 2022-11-22

## 2022-11-22 RX ORDER — HYDROCODONE BITARTRATE AND ACETAMINOPHEN 5; 325 MG/1; MG/1
1 TABLET ORAL ONCE
Status: COMPLETED | OUTPATIENT
Start: 2022-11-22 | End: 2022-11-22

## 2022-11-22 RX ADMIN — BENZOCAINE, BUTAMBEN, AND TETRACAINE HYDROCHLORIDE: .028; .004; .004 AEROSOL, SPRAY TOPICAL at 14:49

## 2022-11-22 RX ADMIN — ALUMINUM HYDROXIDE, MAGNESIUM HYDROXIDE, AND DIMETHICONE 10 ML: 400; 400; 40 SUSPENSION ORAL at 14:49

## 2022-11-22 RX ADMIN — LIDOCAINE HYDROCHLORIDE 10 ML: 20 SOLUTION ORAL; TOPICAL at 14:48

## 2022-11-22 RX ADMIN — HYDROCODONE BITARTRATE AND ACETAMINOPHEN 1 TABLET: 5; 325 TABLET ORAL at 14:38

## 2022-11-22 NOTE — DISCHARGE INSTRUCTIONS
Home to rest. Tylenol for pain. Use dental wax as needed. Use dental balls as needed. Follow up with oral surgery as scheduled.

## 2022-11-22 NOTE — ED PROVIDER NOTES
Subjective   History of Present Illness  22 year old female patient presents to ER with complaint of dental pain to lower left wisdom tooth. She was seen by a dentist today in [place] who she reports numbed her, started drilling, then aborted the procedure and referred her to oral surgery. She made an appointment with oral surgery for Monday however she is crying and reporting pain 10/10. She is also pregnant, LMP 7/1/2022. She has been using tylenol and ibuprofen at home without relief. She quit smoking one month ago and is vaping, denies ETOH, denies any recent illicit drug use.        Review of Systems   Constitutional: Negative.    HENT: Positive for dental problem.    Eyes: Negative.    Respiratory: Negative.    Cardiovascular: Negative.    Gastrointestinal: Negative.    Endocrine: Negative.    Genitourinary: Negative.    Musculoskeletal: Negative.    Skin: Negative.    Allergic/Immunologic: Negative.    Neurological: Negative.    Hematological: Negative.    Psychiatric/Behavioral: Negative.        Past Medical History:   Diagnosis Date   • ADHD    • Allergic    • Anxiety    • Lumbar radiculopathy 11/15/2021   • Obesity    • Postural hypotension    • Urinary tract infection        No Known Allergies    Past Surgical History:   Procedure Laterality Date   • LUMBAR DISCECTOMY Right 11/19/2021    Procedure: LUMBAR DISCECTOMY L4-5 RIGHT;  Surgeon: Bony Orellana MD;  Location: [facility];  Service: Neurosurgery;  Laterality: Right;       Family History   Problem Relation Age of Onset   • Diabetes Mother    • Stroke Mother    • Fibromyalgia Mother    • Hypertension Father    • Heart attack Father    • Heart disease Father    • Schizophrenia Brother    • Bipolar disorder Brother    • No Known Problems Brother    • No Known Problems Son    • Hepatitis Maternal Grandmother    • Schizophrenia Maternal Grandmother    • Heart disease Paternal Grandmother    • Diabetes Paternal Grandmother    • Heart failure Paternal  "Grandmother    • Heart disease Paternal Grandfather    • Heart attack Paternal Grandfather        Social History     Socioeconomic History   • Marital status:    • Highest education level: High school graduate   Tobacco Use   • Smoking status: Former     Packs/day: 0.50     Types: Cigarettes     Quit date: 2022     Years since quittin.0   • Smokeless tobacco: Never   Vaping Use   • Vaping Use: Some days   • Substances: Nicotine, Flavoring   • Devices: Disposable, Pre-filled pod   Substance and Sexual Activity   • Alcohol use: Not Currently   • Drug use: Not Currently     Types: Marijuana   • Sexual activity: Yes     Partners: Male     Comment: unsure about last pap smear           Objective    BP 96/59 (BP Location: Right arm, Patient Position: Sitting)   Pulse 85   Temp 98.2 °F (36.8 °C) (Oral)   Resp 18   Ht 161.3 cm (63.5\")   Wt 80.2 kg (176 lb 11.2 oz)   LMP 2022 (Exact Date)   SpO2 98%   BMI 30.81 kg/m²     Physical Exam  Vitals and nursing note reviewed.   Constitutional:       General: She is not in acute distress.     Appearance: Normal appearance. She is not ill-appearing, toxic-appearing or diaphoretic.   HENT:      Head: Normocephalic.      Right Ear: External ear normal.      Left Ear: External ear normal.      Nose: Nose normal.      Mouth/Throat:      Mouth: Mucous membranes are moist.        Comments: Impacted left mandibular wisdom tooth noted  Eyes:      Pupils: Pupils are equal, round, and reactive to light.   Cardiovascular:      Rate and Rhythm: Normal rate and regular rhythm.      Pulses: Normal pulses.      Heart sounds: Normal heart sounds.   Pulmonary:      Effort: Pulmonary effort is normal.      Breath sounds: Normal breath sounds.   Abdominal:      General: Bowel sounds are normal.      Palpations: Abdomen is soft.   Musculoskeletal:         General: Normal range of motion.      Cervical back: Normal range of motion.   Skin:     General: Skin is warm and dry. "      Capillary Refill: Capillary refill takes less than 2 seconds.   Neurological:      Mental Status: She is alert and oriented to person, place, and time.   Psychiatric:         Mood and Affect: Mood normal.         Behavior: Behavior normal.         Thought Content: Thought content normal.         Judgment: Judgment normal.         Procedures           ED Course                                           MDM    Final diagnoses:   Pain, dental       ED Disposition  ED Disposition     ED Disposition   Discharge    Condition   Stable    Comment   --             BDM  RESIDENT [facility]  [address] [place] [address] [phone]    ER follow up, As needed         Medication List      Stop    amoxicillin 500 MG capsule  Commonly known as: Belkys Davalos, APRN  11/22/22 2085

## 2022-11-30 ENCOUNTER — PATIENT OUTREACH (OUTPATIENT)
Dept: OBSTETRICS AND GYNECOLOGY | Facility: HOSPITAL | Age: 22
End: 2022-11-30

## 2022-11-30 DIAGNOSIS — Z34.82 MULTIGRAVIDA IN SECOND TRIMESTER: ICD-10-CM

## 2022-11-30 DIAGNOSIS — O99.321 DRUG DEPENDENCE AFFECTING PREGNANCY IN FIRST TRIMESTER: ICD-10-CM

## 2022-11-30 DIAGNOSIS — O21.9 NAUSEA AND VOMITING DURING PREGNANCY PRIOR TO 22 WEEKS GESTATION: Primary | ICD-10-CM

## 2022-11-30 NOTE — OUTREACH NOTE
[facility]  Check-In    Current Estimated Gestational Age: 21w5d    Questions/Answers    Flowsheet Row Responses   Best Method for Contacting Cell   Demographics Reviewed Yes   Currently Employed No   Able to keep appointments as scheduled Yes   Gender(s) and Name(s) Jacque Nicole   Baby Active/Feeling Fetal Movemen Yes   How are you presently feeling? doing well other than tooth pain   Questions regarding prenatal visits or tests to be ordered? No   Education related to new diagnoses/home equipment No   May I ask you questions about your substance use? Yes   Other Comment denies   Supplies ready for baby Breast Pump   Resource/Environmental Concerns None              Criselda Tran RN  Maternity Nurse Navigator    11/30/2022, 14:17 CST      [facility]  Intake    Current Estimated Gestational Age: 21w5d    Questions/Answers    Flowsheet Row Responses   Best Method for Contacting Cell   Do you have a dentist? Yes   Dentist Name Dr. Thornton   Have you seen a dentist in the last 6 months Yes   Next Appointment: 12/02/22   Resources Presently Utilizing: WIC (Women, Infant, Children)   Maternal Warning Signs Provided   Other: Provided              Criselda Tran RN  Maternity Nurse Navigator    11/30/2022, 14:17 CST

## 2022-12-01 ENCOUNTER — ROUTINE PRENATAL (OUTPATIENT)
Dept: OBSTETRICS AND GYNECOLOGY | Facility: CLINIC | Age: 22
End: 2022-12-01

## 2022-12-01 VITALS — BODY MASS INDEX: 31.04 KG/M2 | WEIGHT: 178 LBS | SYSTOLIC BLOOD PRESSURE: 100 MMHG | DIASTOLIC BLOOD PRESSURE: 60 MMHG

## 2022-12-01 DIAGNOSIS — O21.9 NAUSEA AND VOMITING DURING PREGNANCY PRIOR TO 22 WEEKS GESTATION: ICD-10-CM

## 2022-12-01 DIAGNOSIS — O99.321 DRUG DEPENDENCE AFFECTING PREGNANCY IN FIRST TRIMESTER: ICD-10-CM

## 2022-12-01 DIAGNOSIS — Z34.82 MULTIGRAVIDA IN SECOND TRIMESTER: Primary | ICD-10-CM

## 2022-12-01 DIAGNOSIS — Z3A.21 21 WEEKS GESTATION OF PREGNANCY: ICD-10-CM

## 2022-12-01 PROBLEM — Z87.891 FORMER SMOKER: Status: RESOLVED | Noted: 2021-05-26 | Resolved: 2022-12-01

## 2022-12-01 PROBLEM — F17.200 SMOKER: Status: RESOLVED | Noted: 2021-11-04 | Resolved: 2022-12-01

## 2022-12-01 PROBLEM — O21.0 MILD HYPEREMESIS GRAVIDARUM: Status: RESOLVED | Noted: 2022-09-15 | Resolved: 2022-12-01

## 2022-12-01 PROBLEM — O21.0 HYPEREMESIS ARISING DURING PREGNANCY: Status: RESOLVED | Noted: 2022-09-14 | Resolved: 2022-12-01

## 2022-12-01 PROBLEM — E66.09 CLASS 1 OBESITY DUE TO EXCESS CALORIES WITH BODY MASS INDEX (BMI) OF 31.0 TO 31.9 IN ADULT: Status: RESOLVED | Noted: 2021-05-26 | Resolved: 2022-12-01

## 2022-12-01 PROCEDURE — 0502F SUBSEQUENT PRENATAL CARE: CPT | Performed by: OBSTETRICS & GYNECOLOGY

## 2022-12-01 NOTE — PROGRESS NOTES
"CC: Prenatal visit    Moriah Nicole is a 22 y.o.  at 21w6d.  Doing well.  Denies contractions, LOF, or VB.  Naming boy \"Matt Bedoya\" after Matt pedersen; her  is from there.    /60   Wt 80.7 kg (178 lb)   LMP 2022 (Exact Date)   BMI 31.04 kg/m²      Fetal Heart Rate: 139     Prelim US- EFW 463g w/ AC 35%ile, IGLESIA 16.9 cm, cephalic, placenta posterior w/o previa, anatomy WNL, BOY, CL 4.55 cm, R and L ovary WNL     Problems (from 22 to present)     Problem Noted Resolved    Nausea and vomiting during pregnancy prior to 22 weeks gestation 11/10/2022 by Melissa Bull APRN No    Multigravida in second trimester 11/10/2022 by Melissa Bull APRN No    Drug dependence affecting pregnancy in first trimester 2022 by Dai Painting MD No    Overview Signed 2022  4:11 PM by Dai Painting MD     +THC on NOB visit             A/P: Moriah Nicole is a 22 y.o.  at 21w6d.  - RTC in 4 weeks  - RTC in 8 weeks w/ 3T labs, Tdap  - Reviewed COVID-19 visitation policy  - Reviewed COVID-19 precautions     Diagnosis Plan   1. Multigravida in second trimester  CBC (No Diff)    Glucose, Post 50 Gm Glucola      2. Nausea and vomiting during pregnancy prior to 22 weeks gestation        3. Drug dependence affecting pregnancy in first trimester        4. 21 weeks gestation of pregnancy          Dai Painting MD  2022  15:00 CST  "

## 2022-12-28 RX ORDER — BUPROPION HYDROCHLORIDE 150 MG/1
150 TABLET ORAL EVERY MORNING
Qty: 30 TABLET | Refills: 5 | Status: SHIPPED | OUTPATIENT
Start: 2022-12-28 | End: 2023-01-24

## 2023-01-09 ENCOUNTER — TELEPHONE (OUTPATIENT)
Dept: OBSTETRICS AND GYNECOLOGY | Facility: CLINIC | Age: 23
End: 2023-01-09
Payer: COMMERCIAL

## 2023-01-09 NOTE — TELEPHONE ENCOUNTER
Pt called with complaints of bilateral breast edema over the weekend. This am she is having leaking of bilateral breast.  No fever or feeling unwell.  Recommended she wear fitted bra.  Apply ice and cold cabbage leaves to bilateral breasts several times each day.  Avoid touching or nipple expression.  For worsening of symptoms or fever needs to be seen.  Pt v/u.

## 2023-01-24 ENCOUNTER — LAB (OUTPATIENT)
Dept: LAB | Facility: HOSPITAL | Age: 23
End: 2023-01-24
Payer: COMMERCIAL

## 2023-01-24 ENCOUNTER — ROUTINE PRENATAL (OUTPATIENT)
Dept: OBSTETRICS AND GYNECOLOGY | Facility: CLINIC | Age: 23
End: 2023-01-24
Payer: COMMERCIAL

## 2023-01-24 ENCOUNTER — PATIENT OUTREACH (OUTPATIENT)
Dept: LABOR AND DELIVERY | Facility: HOSPITAL | Age: 23
End: 2023-01-24
Payer: COMMERCIAL

## 2023-01-24 VITALS — SYSTOLIC BLOOD PRESSURE: 112 MMHG | DIASTOLIC BLOOD PRESSURE: 70 MMHG | WEIGHT: 186.4 LBS | BODY MASS INDEX: 32.5 KG/M2

## 2023-01-24 DIAGNOSIS — Z3A.29 29 WEEKS GESTATION OF PREGNANCY: ICD-10-CM

## 2023-01-24 DIAGNOSIS — Z34.82 MULTIGRAVIDA IN SECOND TRIMESTER: ICD-10-CM

## 2023-01-24 DIAGNOSIS — O99.321 DRUG DEPENDENCE AFFECTING PREGNANCY IN FIRST TRIMESTER: ICD-10-CM

## 2023-01-24 DIAGNOSIS — O21.9 NAUSEA AND VOMITING DURING PREGNANCY PRIOR TO 22 WEEKS GESTATION: ICD-10-CM

## 2023-01-24 DIAGNOSIS — Z34.82 MULTIGRAVIDA IN SECOND TRIMESTER: Primary | ICD-10-CM

## 2023-01-24 LAB
DEPRECATED RDW RBC AUTO: 39.9 FL (ref 37–54)
ERYTHROCYTE [DISTWIDTH] IN BLOOD BY AUTOMATED COUNT: 13.2 % (ref 12.3–15.4)
GLUCOSE 1H P 100 G GLC PO SERPL-MCNC: 104 MG/DL (ref 65–139)
HCT VFR BLD AUTO: 32.9 % (ref 34–46.6)
HGB BLD-MCNC: 11.4 G/DL (ref 12–15.9)
MCH RBC QN AUTO: 28.9 PG (ref 26.6–33)
MCHC RBC AUTO-ENTMCNC: 34.7 G/DL (ref 31.5–35.7)
MCV RBC AUTO: 83.3 FL (ref 79–97)
PLATELET # BLD AUTO: 234 10*3/MM3 (ref 140–450)
PMV BLD AUTO: 10.8 FL (ref 6–12)
RBC # BLD AUTO: 3.95 10*6/MM3 (ref 3.77–5.28)
WBC NRBC COR # BLD: 9.26 10*3/MM3 (ref 3.4–10.8)

## 2023-01-24 PROCEDURE — 0502F SUBSEQUENT PRENATAL CARE: CPT | Performed by: OBSTETRICS & GYNECOLOGY

## 2023-01-24 PROCEDURE — 85027 COMPLETE CBC AUTOMATED: CPT

## 2023-01-24 PROCEDURE — 36415 COLL VENOUS BLD VENIPUNCTURE: CPT

## 2023-01-24 PROCEDURE — 82950 GLUCOSE TEST: CPT

## 2023-01-24 NOTE — OUTREACH NOTE
[facility]  Unable to Reach       Questions/Answers    Flowsheet Row Responses   Pending Outreach Prenatal Check-in   Call Attempt First   Outcome No answer/busy   Unable to reach comments: will attempt 36 week call              Criselda Tran, RN  Maternity Nurse Navigator    1/24/2023, 14:17 CST

## 2023-01-24 NOTE — PROGRESS NOTES
CC: Prenatal visit    Moriah Nicole is a 22 y.o.  at 29w4d.  Doing well.  Denies contractions, LOF, or VB.  Reports good FM.  Reports that she is having a lot of issues with her teeth and pain in her teeth.  She has been seeing Dr. Thornton.  She had a wisdom tooth pulled and another tooth had cracked.  She is having issues w/ sleep due to this pain.    /70   Wt 84.6 kg (186 lb 6.4 oz)   LMP 2022 (Exact Date)   BMI 32.50 kg/m²   Fundal Height (cm): 29 cm  Fetal Heart Rate: 150     Problems (from 22 to present)     Problem Noted Resolved    Nausea and vomiting during pregnancy prior to 22 weeks gestation 11/10/2022 by Melissa Bull APRN No    Multigravida in second trimester 11/10/2022 by Melissa Bull APRN No    Drug dependence affecting pregnancy in first trimester 2022 by Dai Painting MD No    Overview Signed 2022  4:11 PM by Dai Painting MD     +THC on NOB visit             A/P: Moriah Nicole is a 22 y.o.  at 29w4d.  - RTC in 2 weeks  - 3T labs  - Tdap info given; discuss w/ patient at next visit  - Breastpump script already sent  - Encouraged patient to f/u w/ Dr. Thornton today since she continues to have issues  - Discussed using Unisom for sleep to see if this would help  - Reviewed COVID-19 visitation policy  - Reviewed COVID-19 precautions     Diagnosis Plan   1. Multigravida in second trimester        2. Nausea and vomiting during pregnancy prior to 22 weeks gestation        3. Drug dependence affecting pregnancy in first trimester        4. 29 weeks gestation of pregnancy          Dai Painting MD  2023  09:19 CST

## 2023-02-09 ENCOUNTER — ROUTINE PRENATAL (OUTPATIENT)
Dept: OBSTETRICS AND GYNECOLOGY | Facility: CLINIC | Age: 23
End: 2023-02-09
Payer: COMMERCIAL

## 2023-02-09 VITALS — BODY MASS INDEX: 32.43 KG/M2 | SYSTOLIC BLOOD PRESSURE: 110 MMHG | DIASTOLIC BLOOD PRESSURE: 60 MMHG | WEIGHT: 186 LBS

## 2023-02-09 DIAGNOSIS — O21.9 NAUSEA AND VOMITING DURING PREGNANCY PRIOR TO 22 WEEKS GESTATION: ICD-10-CM

## 2023-02-09 DIAGNOSIS — Z3A.31 31 WEEKS GESTATION OF PREGNANCY: Primary | ICD-10-CM

## 2023-02-09 DIAGNOSIS — O99.321 DRUG DEPENDENCE AFFECTING PREGNANCY IN FIRST TRIMESTER: ICD-10-CM

## 2023-02-09 DIAGNOSIS — Z34.83 MULTIGRAVIDA IN THIRD TRIMESTER: ICD-10-CM

## 2023-02-09 DIAGNOSIS — Z23 NEED FOR TDAP VACCINATION: ICD-10-CM

## 2023-02-09 PROBLEM — Z34.82 MULTIGRAVIDA IN SECOND TRIMESTER: Status: RESOLVED | Noted: 2022-11-10 | Resolved: 2023-02-09

## 2023-02-09 PROCEDURE — 0502F SUBSEQUENT PRENATAL CARE: CPT

## 2023-02-09 PROCEDURE — 90471 IMMUNIZATION ADMIN: CPT

## 2023-02-09 PROCEDURE — 90715 TDAP VACCINE 7 YRS/> IM: CPT

## 2023-02-09 NOTE — PROGRESS NOTES
CC: Prenatal visit    Moriah Nicole is a 22 y.o.  at 31w6d.  Doing well.  Denies contractions, LOF, or VB.  Reports good FM. Reports she was able to get in with Dr. Thornton and have 1 tooth removed a few days ago. Reports a lot less pain and discomfort.     /60   Wt 84.4 kg (186 lb)   LMP 2022 (Exact Date)   BMI 32.43 kg/m²   SVE: NA  Fundal Height (cm): 31 cm  Fetal Heart Rate: 134     Problems (from 22 to present)     Problem Noted Resolved    Nausea and vomiting during pregnancy prior to 22 weeks gestation 11/10/2022 by Melissa Bull APRN No    Drug dependence affecting pregnancy in first trimester 2022 by Dai Painting MD No    Overview Signed 2022  4:11 PM by Dai Painting MD     +THC on NOB visit         Multigravida in second trimester 11/10/2022 by Melissa Bull APRN 2023 by Katharine Torres APRN          A/P: Moriah Nicole is a 22 y.o.  at 31w6d.  - RTC in 2 weeks  - Tdap given today   - Reviewed COVID-19 visitation policy  - Reviewed COVID-19 precautions     Diagnosis Plan   1. 31 weeks gestation of pregnancy        2. Nausea and vomiting during pregnancy prior to 22 weeks gestation        3. Drug dependence affecting pregnancy in first trimester        4. Multigravida in third trimester          DAVID David  2023  11:30 CST    
(3) assistive equipment and person

## 2023-02-23 ENCOUNTER — ROUTINE PRENATAL (OUTPATIENT)
Dept: OBSTETRICS AND GYNECOLOGY | Facility: CLINIC | Age: 23
End: 2023-02-23
Payer: COMMERCIAL

## 2023-02-23 VITALS — SYSTOLIC BLOOD PRESSURE: 102 MMHG | WEIGHT: 187 LBS | BODY MASS INDEX: 32.61 KG/M2 | DIASTOLIC BLOOD PRESSURE: 70 MMHG

## 2023-02-23 DIAGNOSIS — O21.9 NAUSEA AND VOMITING DURING PREGNANCY PRIOR TO 22 WEEKS GESTATION: ICD-10-CM

## 2023-02-23 DIAGNOSIS — Z3A.33 33 WEEKS GESTATION OF PREGNANCY: ICD-10-CM

## 2023-02-23 DIAGNOSIS — Z34.83 ENCOUNTER FOR SUPERVISION OF OTHER NORMAL PREGNANCY IN THIRD TRIMESTER: Primary | ICD-10-CM

## 2023-02-23 DIAGNOSIS — O99.321 DRUG DEPENDENCE AFFECTING PREGNANCY IN FIRST TRIMESTER: ICD-10-CM

## 2023-02-23 PROBLEM — Z34.90 SUPERVISION OF NORMAL PREGNANCY: Status: ACTIVE | Noted: 2023-02-23

## 2023-02-23 PROBLEM — O09.93 SUPERVISION OF HIGH RISK PREGNANCY IN THIRD TRIMESTER: Status: ACTIVE | Noted: 2023-02-23

## 2023-02-23 PROCEDURE — 0502F SUBSEQUENT PRENATAL CARE: CPT | Performed by: OBSTETRICS & GYNECOLOGY

## 2023-02-23 RX ORDER — OXYTOCIN 10 [USP'U]/ML
999 INJECTION, SOLUTION INTRAMUSCULAR; INTRAVENOUS ONCE
Status: CANCELLED | OUTPATIENT
Start: 2023-02-23 | End: 2023-02-23

## 2023-02-23 RX ORDER — LIDOCAINE HYDROCHLORIDE 10 MG/ML
5 INJECTION, SOLUTION EPIDURAL; INFILTRATION; INTRACAUDAL; PERINEURAL AS NEEDED
Status: CANCELLED | OUTPATIENT
Start: 2023-02-23

## 2023-02-23 RX ORDER — CARBOPROST TROMETHAMINE 250 UG/ML
250 INJECTION, SOLUTION INTRAMUSCULAR AS NEEDED
Status: CANCELLED | OUTPATIENT
Start: 2023-02-23

## 2023-02-23 RX ORDER — BUTORPHANOL TARTRATE 1 MG/ML
2 INJECTION, SOLUTION INTRAMUSCULAR; INTRAVENOUS
Status: CANCELLED | OUTPATIENT
Start: 2023-02-23

## 2023-02-23 RX ORDER — MISOPROSTOL 100 MCG
50 TABLET ORAL EVERY 4 HOURS
Status: CANCELLED | OUTPATIENT
Start: 2023-02-23 | End: 2023-02-23

## 2023-02-23 RX ORDER — MISOPROSTOL 100 UG/1
800 TABLET ORAL AS NEEDED
Status: CANCELLED | OUTPATIENT
Start: 2023-02-23

## 2023-02-23 RX ORDER — BUTORPHANOL TARTRATE 1 MG/ML
1 INJECTION, SOLUTION INTRAMUSCULAR; INTRAVENOUS
Status: CANCELLED | OUTPATIENT
Start: 2023-02-23

## 2023-02-23 RX ORDER — METHYLERGONOVINE MALEATE 0.2 MG/ML
200 INJECTION INTRAVENOUS ONCE AS NEEDED
Status: CANCELLED | OUTPATIENT
Start: 2023-02-23

## 2023-02-23 RX ORDER — ONDANSETRON 4 MG/1
4 TABLET, FILM COATED ORAL EVERY 6 HOURS PRN
Status: CANCELLED | OUTPATIENT
Start: 2023-02-23

## 2023-02-23 RX ORDER — PROMETHAZINE HYDROCHLORIDE 25 MG/1
25 TABLET ORAL EVERY 6 HOURS PRN
Status: CANCELLED | OUTPATIENT
Start: 2023-02-23

## 2023-02-23 RX ORDER — IBUPROFEN 200 MG
800 TABLET ORAL EVERY 8 HOURS
Status: CANCELLED | OUTPATIENT
Start: 2023-02-23

## 2023-02-23 RX ORDER — PROMETHAZINE HYDROCHLORIDE 25 MG/1
12.5 SUPPOSITORY RECTAL EVERY 6 HOURS PRN
Status: CANCELLED | OUTPATIENT
Start: 2023-02-23

## 2023-02-23 RX ORDER — SODIUM CHLORIDE 0.9 % (FLUSH) 0.9 %
3-10 SYRINGE (ML) INJECTION AS NEEDED
Status: CANCELLED | OUTPATIENT
Start: 2023-02-23

## 2023-02-23 RX ORDER — OXYTOCIN 10 [USP'U]/ML
250 INJECTION, SOLUTION INTRAMUSCULAR; INTRAVENOUS CONTINUOUS
Status: CANCELLED | OUTPATIENT
Start: 2023-02-23 | End: 2023-02-23

## 2023-02-23 RX ORDER — SODIUM CHLORIDE, SODIUM LACTATE, POTASSIUM CHLORIDE, CALCIUM CHLORIDE 600; 310; 30; 20 MG/100ML; MG/100ML; MG/100ML; MG/100ML
125 INJECTION, SOLUTION INTRAVENOUS CONTINUOUS
Status: CANCELLED | OUTPATIENT
Start: 2023-02-23

## 2023-02-23 RX ORDER — ONDANSETRON 2 MG/ML
4 INJECTION INTRAMUSCULAR; INTRAVENOUS EVERY 6 HOURS PRN
Status: CANCELLED | OUTPATIENT
Start: 2023-02-23

## 2023-02-23 RX ORDER — SODIUM CHLORIDE 0.9 % (FLUSH) 0.9 %
3 SYRINGE (ML) INJECTION EVERY 12 HOURS SCHEDULED
Status: CANCELLED | OUTPATIENT
Start: 2023-02-23

## 2023-02-23 RX ORDER — ACETAMINOPHEN 325 MG/1
1000 TABLET ORAL EVERY 6 HOURS
Status: CANCELLED | OUTPATIENT
Start: 2023-02-23

## 2023-02-23 NOTE — PROGRESS NOTES
CC: Prenatal visit    Moriah Nicole is a 22 y.o.  at 33w6d.  Doing well.  Denies contractions, LOF, or VB.  Reports good FM.  Worried that the baby is still breech.    /70   Wt 84.8 kg (187 lb)   LMP 2022 (Exact Date)   BMI 32.61 kg/m²    BSUS: cephalic     Fetal Heart Rate: 140s     Problems (from 22 to present)     Problem Noted Resolved    Supervision of normal pregnancy 2023 by Dai Painting MD No    Nausea and vomiting during pregnancy prior to 22 weeks gestation 11/10/2022 by Melissa Bull APRN No    Drug dependence affecting pregnancy in first trimester 2022 by Dai Painting MD No    Overview Signed 2022  4:11 PM by Dai Painting MD     +THC on NOB visit         Multigravida in second trimester 11/10/2022 by Melissa Bull APRN 2023 by Katharine Torres APRN        A/P: Moriah Nicole is a 22 y.o.  at 33w6d.  - RTC in 2 weeks w/ GBS  - Patient strongly desires EIOL at 39 wks; scheduled for 39w0d w/ Dr. Brock, orders placed  - Reviewed COVID-19 visitation policy  - Reviewed COVID-19 precautions     Diagnosis Plan   1. Encounter for supervision of other normal pregnancy in third trimester        2. Nausea and vomiting during pregnancy prior to 22 weeks gestation        3. Drug dependence affecting pregnancy in first trimester        4. 33 weeks gestation of pregnancy          Dai Painting MD  2023  09:32 CST

## 2023-02-28 ENCOUNTER — HOSPITAL ENCOUNTER (EMERGENCY)
Facility: HOSPITAL | Age: 23
Discharge: HOME | End: 2023-02-28
Attending: EMERGENCY MEDICINE | Admitting: EMERGENCY MEDICINE
Payer: COMMERCIAL

## 2023-02-28 VITALS
DIASTOLIC BLOOD PRESSURE: 69 MMHG | HEIGHT: 63 IN | BODY MASS INDEX: 32.78 KG/M2 | OXYGEN SATURATION: 98 % | WEIGHT: 185 LBS | RESPIRATION RATE: 20 BRPM | HEART RATE: 87 BPM | TEMPERATURE: 98.3 F | SYSTOLIC BLOOD PRESSURE: 109 MMHG

## 2023-02-28 DIAGNOSIS — K02.9 PAIN DUE TO DENTAL CARIES: ICD-10-CM

## 2023-02-28 DIAGNOSIS — R51.9 NONINTRACTABLE HEADACHE, UNSPECIFIED CHRONICITY PATTERN, UNSPECIFIED HEADACHE TYPE: Primary | ICD-10-CM

## 2023-02-28 PROCEDURE — 96372 THER/PROPH/DIAG INJ SC/IM: CPT

## 2023-02-28 PROCEDURE — 99283 EMERGENCY DEPT VISIT LOW MDM: CPT

## 2023-02-28 PROCEDURE — 25010000002 PROCHLORPERAZINE 10 MG/2ML SOLUTION: Performed by: EMERGENCY MEDICINE

## 2023-02-28 PROCEDURE — 25010000002 DIPHENHYDRAMINE PER 50 MG: Performed by: EMERGENCY MEDICINE

## 2023-02-28 RX ORDER — DIPHENHYDRAMINE HYDROCHLORIDE 50 MG/ML
25 INJECTION INTRAMUSCULAR; INTRAVENOUS ONCE
Status: COMPLETED | OUTPATIENT
Start: 2023-02-28 | End: 2023-02-28

## 2023-02-28 RX ORDER — LIDOCAINE HYDROCHLORIDE 20 MG/ML
10 SOLUTION OROPHARYNGEAL AS NEEDED
Qty: 100 ML | Refills: 0 | Status: ON HOLD | OUTPATIENT
Start: 2023-02-28 | End: 2023-03-27

## 2023-02-28 RX ORDER — PROCHLORPERAZINE EDISYLATE 5 MG/ML
5 INJECTION INTRAMUSCULAR; INTRAVENOUS ONCE
Status: COMPLETED | OUTPATIENT
Start: 2023-02-28 | End: 2023-02-28

## 2023-02-28 RX ORDER — LIDOCAINE HYDROCHLORIDE 20 MG/ML
10 SOLUTION OROPHARYNGEAL ONCE
Status: DISCONTINUED | OUTPATIENT
Start: 2023-02-28 | End: 2023-03-01 | Stop reason: HOSPADM

## 2023-02-28 RX ADMIN — PROCHLORPERAZINE EDISYLATE 5 MG: 5 INJECTION INTRAMUSCULAR; INTRAVENOUS at 22:09

## 2023-02-28 RX ADMIN — DIPHENHYDRAMINE HYDROCHLORIDE 25 MG: 50 INJECTION INTRAMUSCULAR; INTRAVENOUS at 22:09

## 2023-03-01 ENCOUNTER — TELEPHONE (OUTPATIENT)
Dept: OBSTETRICS AND GYNECOLOGY | Facility: CLINIC | Age: 23
End: 2023-03-01
Payer: COMMERCIAL

## 2023-03-01 RX ORDER — AMOXICILLIN 500 MG/1
500 CAPSULE ORAL 3 TIMES DAILY
Qty: 15 CAPSULE | Refills: 0 | Status: SHIPPED | OUTPATIENT
Start: 2023-03-01 | End: 2023-03-06

## 2023-03-01 NOTE — TELEPHONE ENCOUNTER
PT is having tooth pain and headaches. She went to the ED but what they did for her has not helped. PT requested a returned call to discuss what she needs to do. PT cannot get into the dentist for a few weeks. PT can be reached at [phone].    SPOKE WITH PT'S , EDI, TO LET THE PT KNOW THAT PER DR SOTOMAYOR, WE HAVE SENT IN AN ANTIBIOTIC TO [facility], AND TO ADVISE THE PT TO TRY TYLENOL AND EVEN BENADRYL FOR SLEEP IF NEEDED.  HE STATED THAT HE WOULD LET THE PT KNOW.  (THEY ARE SHARING A PHONE AT THIS TIME, AND HE IS ON HER VERBAL RELEASE.)

## 2023-03-01 NOTE — TELEPHONE ENCOUNTER
PT is having tooth pain and headaches. She went to the ED but what they did for her has not helped. PT requested a returned call to discuss what she needs to do. PT cannot get into the dentist for a few weeks. PT can be reached at [phone].

## 2023-03-01 NOTE — ED PROVIDER NOTES
Subjective   History of Present Illness  22-year-old female patient presents approximately 33 weeks pregnant with headache.  Described as moderate.  Gradual onset and constant for several days now.  She feels the majority of it in the top of her neck and the back of her head.  Throbbing.  Not improved with Tylenol.  She denies fevers or chills.  No confusion.  No rash.  She denies head trauma.  She denies numbness or weakness of her extremities.  She denies vision changes.  She denies history of primary headache disorder prior to her pregnancy.  She states that she has been checked several times recently for blood pressures and she has not been significantly hypertensive.  The patient also claims dental pain of the right lower molars.        Review of Systems   All other systems reviewed and are negative.      Past Medical History:   Diagnosis Date   • ADHD    • Allergic    • Anxiety    • Lumbar radiculopathy 11/15/2021       No Known Allergies    Past Surgical History:   Procedure Laterality Date   • LUMBAR DISCECTOMY Right 11/19/2021    Procedure: LUMBAR DISCECTOMY L4-5 RIGHT;  Surgeon: Bony Orellana MD;  Location: [facility];  Service: Neurosurgery;  Laterality: Right;       Family History   Problem Relation Age of Onset   • Diabetes Mother    • Stroke Mother    • Fibromyalgia Mother    • Hypertension Father    • Heart attack Father    • Heart disease Father    • Schizophrenia Brother    • Bipolar disorder Brother    • No Known Problems Brother    • No Known Problems Son    • Hepatitis Maternal Grandmother    • Schizophrenia Maternal Grandmother    • Heart disease Paternal Grandmother    • Diabetes Paternal Grandmother    • Heart failure Paternal Grandmother    • Heart disease Paternal Grandfather    • Heart attack Paternal Grandfather        Social History     Socioeconomic History   • Marital status:    • Highest education level: High school graduate   Tobacco Use   • Smoking status: Former      "Packs/day: 0.50     Types: Cigarettes     Quit date: 2022     Years since quittin.3   • Smokeless tobacco: Never   Vaping Use   • Vaping Use: Some days   • Substances: Nicotine, Flavoring   • Devices: Disposable, Pre-filled pod   Substance and Sexual Activity   • Alcohol use: Not Currently   • Drug use: Not Currently     Types: Marijuana   • Sexual activity: Yes     Partners: Male     Comment: unsure about last pap smear           Objective   Physical Exam  Vitals and nursing note reviewed.   Constitutional:       Appearance: She is not ill-appearing.   HENT:      Head: Normocephalic and atraumatic.      Right Ear: External ear normal.      Left Ear: External ear normal.      Nose: Nose normal.      Mouth/Throat:      Mouth: Mucous membranes are moist.      Comments: No obvious intraoral swelling or decayed teeth.  Eyes:      General: No scleral icterus.  Cardiovascular:      Rate and Rhythm: Normal rate and regular rhythm.   Pulmonary:      Effort: Pulmonary effort is normal.      Breath sounds: Normal breath sounds.   Abdominal:      Comments: Gravid above the umbilicus   Skin:     General: Skin is warm and dry.   Neurological:      Mental Status: She is alert and oriented to person, place, and time.      Sensory: No sensory deficit.      Motor: No weakness.      Coordination: Coordination normal.      Comments: Normal ambulation   Psychiatric:         Behavior: Behavior normal.         Procedures           ED Course                                           Medical Decision Making  The patient's recent past medical charts for this facility as well as outside facilities via the \"care everywhere\" application of epic reviewed.  The patient recently saw her OB/GYN.    The differential diagnosis includes migraine, cluster headache, traumatic head injury, and preeclampsia, among others    On final reevaluation the patient states her headache has improved but she continues to have pain in the right lower jaw " teeth, especially the second molar.  Will prescribe dental balls.  The patient is going to follow-up with her dentist.  She can otherwise follow-up with her PCP and/or OB/GYN for further evaluation.  We discussed reasons for early return to the emergency department.      Nonintractable headache, unspecified chronicity pattern, unspecified headache type: acute illness or injury  Pain due to dental caries: acute illness or injury  Risk  OTC drugs.  Prescription drug management.          Final diagnoses:   Nonintractable headache, unspecified chronicity pattern, unspecified headache type   Pain due to dental caries       ED Disposition  ED Disposition     ED Disposition   Discharge    Condition   Stable    Comment   --             Ramon Vargas, APRN  [address] [place] [facility] [address] [phone]    Call in 1 day  Schedule an appointment for recheck after an emergency department visit.    [facility] EMERGENCY DEPARTMENT  [address]  [facility] [address] [phone]    As needed, If symptoms worsen at any time         Medication List      New Prescriptions    Lidocaine Viscous HCl 2 % solution  Commonly known as: XYLOCAINE  Take 10 mL by mouth As Needed for Mild Pain.           Where to Get Your Medications      These medications were sent to [place], [place] - [address] [facility] - [phone]  - [phone]   [address] [facility] [address]    Phone: [phone]   · Lidocaine Viscous HCl 2 % solution          Matthew Fay DO  02/28/23 8339

## 2023-03-07 ENCOUNTER — TELEPHONE (OUTPATIENT)
Dept: OBSTETRICS AND GYNECOLOGY | Facility: CLINIC | Age: 23
End: 2023-03-07

## 2023-03-07 RX ORDER — FLUCONAZOLE 150 MG/1
TABLET ORAL
Qty: 2 TABLET | Refills: 0 | Status: SHIPPED | OUTPATIENT
Start: 2023-03-07 | End: 2023-03-16

## 2023-03-07 NOTE — TELEPHONE ENCOUNTER
PATIENT CALLED AND BELIEVES SHE HAS A YEAST INFECTION FROM WHERE SHE HAD A TOOTH ISSUE. SHE IS WONDERING IF SHE CAN COME IN TO DO LABS TO FIND OUT FOR SURE SHE HAS THIS. HER NUMBER TO CALL BACK [phone].          THANKS,        JULIANA

## 2023-03-09 ENCOUNTER — ROUTINE PRENATAL (OUTPATIENT)
Dept: OBSTETRICS AND GYNECOLOGY | Facility: CLINIC | Age: 23
End: 2023-03-09
Payer: COMMERCIAL

## 2023-03-09 VITALS — WEIGHT: 191.2 LBS | DIASTOLIC BLOOD PRESSURE: 62 MMHG | BODY MASS INDEX: 33.87 KG/M2 | SYSTOLIC BLOOD PRESSURE: 108 MMHG

## 2023-03-09 DIAGNOSIS — Z3A.35 35 WEEKS GESTATION OF PREGNANCY: Primary | ICD-10-CM

## 2023-03-09 DIAGNOSIS — M54.50 LOW BACK PAIN DURING PREGNANCY, THIRD TRIMESTER: ICD-10-CM

## 2023-03-09 DIAGNOSIS — O99.321 DRUG DEPENDENCE AFFECTING PREGNANCY IN FIRST TRIMESTER: ICD-10-CM

## 2023-03-09 DIAGNOSIS — O26.893 LOW BACK PAIN DURING PREGNANCY, THIRD TRIMESTER: ICD-10-CM

## 2023-03-09 PROBLEM — O21.9 NAUSEA AND VOMITING DURING PREGNANCY PRIOR TO 22 WEEKS GESTATION: Status: RESOLVED | Noted: 2022-11-10 | Resolved: 2023-03-09

## 2023-03-09 PROCEDURE — 0502F SUBSEQUENT PRENATAL CARE: CPT | Performed by: NURSE PRACTITIONER

## 2023-03-09 PROCEDURE — 87653 STREP B DNA AMP PROBE: CPT | Performed by: NURSE PRACTITIONER

## 2023-03-09 NOTE — PROGRESS NOTES
CC: Prenatal visit    Moriah Nicole is a 22 y.o.  at 35w6d.  Doing well.  Her low back pain continues.  Maternity cradle and tylenol provide no relief.  She does get mild relief with heating pad.  Denies contractions, LOF, or VB.  Reports good FM.    /62   Wt 86.7 kg (191 lb 3.2 oz)   LMP 2022 (Exact Date)   BMI 33.87 kg/m²   SVE: FT/thick/high            Problems (from 22 to present)     Problem Noted Resolved    Supervision of normal pregnancy 2023 by Dai Painting MD No    Drug dependence affecting pregnancy in first trimester 2022 by Dai Painting MD No    Overview Signed 2022  4:11 PM by Dai Painting MD     +THC on NOB visit         Nausea and vomiting during pregnancy prior to 22 weeks gestation 11/10/2022 by Melissa Bull APRN 3/9/2023 by Melissa Bull APRN    Multigravida in second trimester 11/10/2022 by Melissa Bull APRN 2023 by Katharine Torres APRN          A/P: Moriah Nicole is a 22 y.o.  at 35w6d.  RV GBS swab obtained   - RTC in 1 weeks     Diagnosis Plan   1. 35 weeks gestation of pregnancy  Group B Strep (Molecular) - Swab, Vaginal/Rectum      2. Drug dependence affecting pregnancy in first trimester        3. Low back pain during pregnancy, third trimester  Ambulatory Referral to Physical Therapy Evaluate and treat          DAVID Gonzales  3/9/2023  11:11 CST

## 2023-03-10 LAB — GROUP B STREP, DNA: NEGATIVE

## 2023-03-11 ENCOUNTER — HOSPITAL ENCOUNTER (OUTPATIENT)
Facility: HOSPITAL | Age: 23
Discharge: HOME | End: 2023-03-11
Attending: STUDENT IN AN ORGANIZED HEALTH CARE EDUCATION/TRAINING PROGRAM | Admitting: STUDENT IN AN ORGANIZED HEALTH CARE EDUCATION/TRAINING PROGRAM
Payer: COMMERCIAL

## 2023-03-11 LAB
A1 MICROGLOB PLACENTAL VAG QL: NEGATIVE
BACTERIA UR QL AUTO: ABNORMAL /HPF
BILIRUB UR QL STRIP: ABNORMAL
CANDIDA ALBICANS: NEGATIVE
CLARITY UR: ABNORMAL
COLOR UR: ABNORMAL
GARDNERELLA VAGINALIS: POSITIVE
GLUCOSE UR STRIP-MCNC: NEGATIVE MG/DL
HGB UR QL STRIP.AUTO: NEGATIVE
HYALINE CASTS UR QL AUTO: ABNORMAL /LPF
KETONES UR QL STRIP: ABNORMAL
LEUKOCYTE ESTERASE UR QL STRIP.AUTO: ABNORMAL
NITRITE UR QL STRIP: NEGATIVE
PH UR STRIP.AUTO: 7 [PH] (ref 5–9)
PROT UR QL STRIP: ABNORMAL
RBC # UR STRIP: ABNORMAL /HPF
REF LAB TEST METHOD: ABNORMAL
SP GR UR STRIP: 1.03 (ref 1–1.03)
SQUAMOUS #/AREA URNS HPF: ABNORMAL /HPF
T VAGINALIS DNA VAG QL PROBE+SIG AMP: NEGATIVE
UROBILINOGEN UR QL STRIP: ABNORMAL
WBC # UR STRIP: ABNORMAL /HPF

## 2023-03-11 PROCEDURE — 87480 CANDIDA DNA DIR PROBE: CPT | Performed by: STUDENT IN AN ORGANIZED HEALTH CARE EDUCATION/TRAINING PROGRAM

## 2023-03-11 PROCEDURE — 81001 URINALYSIS AUTO W/SCOPE: CPT | Performed by: STUDENT IN AN ORGANIZED HEALTH CARE EDUCATION/TRAINING PROGRAM

## 2023-03-11 PROCEDURE — 87510 GARDNER VAG DNA DIR PROBE: CPT | Performed by: STUDENT IN AN ORGANIZED HEALTH CARE EDUCATION/TRAINING PROGRAM

## 2023-03-11 PROCEDURE — G0463 HOSPITAL OUTPT CLINIC VISIT: HCPCS

## 2023-03-11 PROCEDURE — 87660 TRICHOMONAS VAGIN DIR PROBE: CPT | Performed by: STUDENT IN AN ORGANIZED HEALTH CARE EDUCATION/TRAINING PROGRAM

## 2023-03-11 PROCEDURE — 84112 EVAL AMNIOTIC FLUID PROTEIN: CPT | Performed by: STUDENT IN AN ORGANIZED HEALTH CARE EDUCATION/TRAINING PROGRAM

## 2023-03-11 PROCEDURE — 59025 FETAL NON-STRESS TEST: CPT

## 2023-03-11 RX ORDER — METRONIDAZOLE 500 MG/1
500 TABLET ORAL 2 TIMES DAILY
Qty: 21 TABLET | Refills: 0 | Status: SHIPPED | OUTPATIENT
Start: 2023-03-11 | End: 2023-03-16

## 2023-03-11 RX ORDER — METRONIDAZOLE 500 MG/1
500 TABLET ORAL ONCE
Status: COMPLETED | OUTPATIENT
Start: 2023-03-11 | End: 2023-03-11

## 2023-03-11 RX ADMIN — METRONIDAZOLE 500 MG: 500 TABLET ORAL at 18:32

## 2023-03-12 NOTE — NON STRESS TEST
Moriah Nicole, a  at 36w1d with an TALIB of 2023, by Last Menstrual Period, was seen at [facility] LABOR DELIVERY for a nonstress test.    Chief Complaint   Patient presents with   • Leaking Fluid     4pm    • other     Reports normal fetal movement. Reports pink discharge today.        Patient Active Problem List   Diagnosis   • Lumbar disc herniation   • Lumbar radiculopathy   • Drug dependence affecting pregnancy in first trimester   • Supervision of normal pregnancy       Start Time: 1700  Stop Time: 1830    Interpretation A  Nonstress Test Interpretation A: Reactive  Comments A: reviewed with Pete TEMPLEC         ua, vag panel, amnisure, sve FT/50/-2

## 2023-03-12 NOTE — DISCHARGE INSTRUCTIONS
Please return to unit for regular contractions that don't subside, bright red vaginal bleeding, leaking of fluid or decreased fetal movement. Follow up with provider at next scheduled appointment.     See clinical references attached to discharge paperwork.

## 2023-03-13 ENCOUNTER — HOSPITAL ENCOUNTER (OUTPATIENT)
Facility: HOSPITAL | Age: 23
Discharge: HOME | End: 2023-03-13
Attending: OBSTETRICS & GYNECOLOGY | Admitting: OBSTETRICS & GYNECOLOGY
Payer: COMMERCIAL

## 2023-03-13 VITALS
HEIGHT: 64 IN | HEART RATE: 89 BPM | RESPIRATION RATE: 22 BRPM | DIASTOLIC BLOOD PRESSURE: 59 MMHG | SYSTOLIC BLOOD PRESSURE: 109 MMHG | OXYGEN SATURATION: 97 % | TEMPERATURE: 97.5 F | BODY MASS INDEX: 33.34 KG/M2

## 2023-03-13 PROCEDURE — G0463 HOSPITAL OUTPT CLINIC VISIT: HCPCS

## 2023-03-13 PROCEDURE — 59025 FETAL NON-STRESS TEST: CPT

## 2023-03-13 PROCEDURE — 59025 FETAL NON-STRESS TEST: CPT | Performed by: OBSTETRICS & GYNECOLOGY

## 2023-03-13 NOTE — NON STRESS TEST
Moriah Nicole, a  at 36w3d with an TALIB of 2023, by Last Menstrual Period, was seen at [facility] LABOR DELIVERY for a nonstress test.    Chief Complaint   Patient presents with   • Contractions     Back pain that started around 1-2 am this morning.  Pelvic pain that started around 10am       Patient Active Problem List   Diagnosis   • Lumbar disc herniation   • Lumbar radiculopathy   • Drug dependence affecting pregnancy in first trimester   • Supervision of normal pregnancy       Start Time: 1400  Stop Time: 1430    Interpretation A  Nonstress Test Interpretation A: Reactive  Comments A: Reviewed with ADAM Valiente      Cervix 1.5/50/-2 and unchanged. D/C home. Follows up in office Thursday.

## 2023-03-14 ENCOUNTER — PATIENT OUTREACH (OUTPATIENT)
Dept: LABOR AND DELIVERY | Facility: HOSPITAL | Age: 23
End: 2023-03-14
Payer: COMMERCIAL

## 2023-03-14 NOTE — OUTREACH NOTE
[facility]  Check-In    Current Estimated Gestational Age: 36w4d    Questions/Answers    Flowsheet Row Responses   Best Method for Contacting Cell   Demographics Reviewed Yes   Currently Employed No   Able to keep appointments as scheduled Yes   Gender(s) and Name(s) Boy- Gutierrez   Baby Active/Feeling Fetal Movemen Yes   How are you presently feeling? feeling large and uncomfortable.   Questions regarding prenatal visits or tests to be ordered? No   Education related to new diagnoses/home equipment No   May I ask you questions about your substance use? Yes   Other Comment denies   Supplies ready for baby Breast Pump   Resource/Environmental Concerns None   Do you have any questions related to your care experience, your pregnancy, plans for delivery, any concerns, etc? No   Other Education Insurance benefits/Incentives            Criselda Kingston RN  Maternity Nurse Navigator    3/14/2023, 14:09 CDT

## 2023-03-16 ENCOUNTER — ROUTINE PRENATAL (OUTPATIENT)
Dept: OBSTETRICS AND GYNECOLOGY | Facility: CLINIC | Age: 23
End: 2023-03-16
Payer: COMMERCIAL

## 2023-03-16 VITALS — DIASTOLIC BLOOD PRESSURE: 64 MMHG | BODY MASS INDEX: 33.83 KG/M2 | SYSTOLIC BLOOD PRESSURE: 108 MMHG | WEIGHT: 194 LBS

## 2023-03-16 DIAGNOSIS — Z34.83 ENCOUNTER FOR SUPERVISION OF OTHER NORMAL PREGNANCY IN THIRD TRIMESTER: ICD-10-CM

## 2023-03-16 DIAGNOSIS — O99.321 DRUG DEPENDENCE AFFECTING PREGNANCY IN FIRST TRIMESTER: ICD-10-CM

## 2023-03-16 DIAGNOSIS — Z3A.36 36 WEEKS GESTATION OF PREGNANCY: Primary | ICD-10-CM

## 2023-03-16 PROCEDURE — 0502F SUBSEQUENT PRENATAL CARE: CPT

## 2023-03-16 NOTE — PROGRESS NOTES
CC: Prenatal visit    Moriah Nicole is a 22 y.o.  at 36w6d.  Doing well.  Denies contractions, LOF, or VB.  Reports good FM. Reports SOB with activity. Feels like there is no room left. Denies cough, sick contacts, chest pain or fever.   Reports she is having a hard time sleeping. She does use Unisom and Benadryl sometimes, without much help.     /64   Wt 88 kg (194 lb)   LMP 2022 (Exact Date)   BMI 33.83 kg/m²   SVE: NA  Fundal Height (cm): 36 cm  Fetal Heart Rate: 148   O2 sat 99% room air   Anterior and Posterior Lungs clear to auscultation      Problems (from 22 to present)     Problem Noted Resolved    Supervision of normal pregnancy 2023 by Dai Painting MD No    Drug dependence affecting pregnancy in first trimester 2022 by Dai Painting MD No    Overview Signed 2022  4:11 PM by Dai Painting MD     +THC on NOB visit         Nausea and vomiting during pregnancy prior to 22 weeks gestation 11/10/2022 by Melissa Bull APRN 3/9/2023 by Mleissa Bull APRN    Multigravida in second trimester 11/10/2022 by Melissa Bull APRN 2023 by Katharine Torres APRN          A/P: Moriah Nicole is a 22 y.o.  at 36w6d.  - RTC in 1 weeks   - Encouraged to take Unisom and Tylenol, warm bath, turn all lights, TV and phone off, for better sleep habits.   - Reviewed COVID-19 visitation policy  - Reviewed COVID-19 precautions     Diagnosis Plan   1. 36 weeks gestation of pregnancy        2. Drug dependence affecting pregnancy in first trimester        3. Encounter for supervision of other normal pregnancy in third trimester          DAVID David  3/16/2023  10:40 CDT

## 2023-03-23 ENCOUNTER — ROUTINE PRENATAL (OUTPATIENT)
Dept: OBSTETRICS AND GYNECOLOGY | Facility: CLINIC | Age: 23
End: 2023-03-23
Payer: COMMERCIAL

## 2023-03-23 VITALS — DIASTOLIC BLOOD PRESSURE: 70 MMHG | BODY MASS INDEX: 33.48 KG/M2 | WEIGHT: 192 LBS | SYSTOLIC BLOOD PRESSURE: 110 MMHG

## 2023-03-23 DIAGNOSIS — O99.321 DRUG DEPENDENCE AFFECTING PREGNANCY IN FIRST TRIMESTER: ICD-10-CM

## 2023-03-23 DIAGNOSIS — Z34.83 ENCOUNTER FOR SUPERVISION OF OTHER NORMAL PREGNANCY IN THIRD TRIMESTER: Primary | ICD-10-CM

## 2023-03-23 DIAGNOSIS — Z3A.37 37 WEEKS GESTATION OF PREGNANCY: ICD-10-CM

## 2023-03-23 NOTE — PROGRESS NOTES
CC: Prenatal visit    Moriah Nicole is a 22 y.o.  at 37w6d.  Doing well.  Denies contractions, LOF, or VB.  Reports good FM.  She would like to move her induction back a few days because she found out that she is going to have a surprise baby shower on the evening of the .    /70   Wt 87.1 kg (192 lb)   LMP 2022 (Exact Date)   BMI 33.48 kg/m²   SVE: 50/-3/soft/mid, vertex  Fundal Height (cm): 34 cm  Fetal Heart Rate: 136     Problems (from 22 to present)     Problem Noted Resolved    Supervision of normal pregnancy 2023 by Dai Painting MD No    Drug dependence affecting pregnancy in first trimester 2022 by Dai Painting MD No    Overview Signed 2022  4:11 PM by Dai Painting MD     +THC on NOB visit         Nausea and vomiting during pregnancy prior to 22 weeks gestation 11/10/2022 by Melissa Bull APRN 3/9/2023 by Melissa Bull APRN    Multigravida in second trimester 11/10/2022 by Melissa Bull APRN 2023 by Katharine Torres APRN        A/P: Moriah Nicole is a 22 y.o.  at 37w6d.  - RTC next week to see Dr. Brock and discuss IOL dates     Diagnosis Plan   1. Encounter for supervision of other normal pregnancy in third trimester        2. Drug dependence affecting pregnancy in first trimester        3. 37 weeks gestation of pregnancy          Dai Painting MD  3/23/2023  11:00 CDT

## 2023-03-27 ENCOUNTER — HOSPITAL ENCOUNTER (OUTPATIENT)
Facility: HOSPITAL | Age: 23
Discharge: HOME | End: 2023-03-27
Attending: OBSTETRICS & GYNECOLOGY | Admitting: OBSTETRICS & GYNECOLOGY
Payer: COMMERCIAL

## 2023-03-27 ENCOUNTER — TELEPHONE (OUTPATIENT)
Dept: OBSTETRICS AND GYNECOLOGY | Facility: CLINIC | Age: 23
End: 2023-03-27

## 2023-03-27 VITALS
HEIGHT: 64 IN | WEIGHT: 192 LBS | DIASTOLIC BLOOD PRESSURE: 64 MMHG | BODY MASS INDEX: 32.78 KG/M2 | RESPIRATION RATE: 20 BRPM | TEMPERATURE: 97.8 F | SYSTOLIC BLOOD PRESSURE: 110 MMHG | HEART RATE: 86 BPM

## 2023-03-27 PROCEDURE — G0463 HOSPITAL OUTPT CLINIC VISIT: HCPCS

## 2023-03-27 PROCEDURE — 59025 FETAL NON-STRESS TEST: CPT

## 2023-03-27 NOTE — TELEPHONE ENCOUNTER
Patient called and stated that she lost her mucus plug and she isn't sure if she leaked fluid with it or not. She is having contractions every 30 minutes and it's very painful. She hasn't had any sleep due to the amount of pain. She has googled remedies to do to help relieve the pain and nothing has helped. She has propped her feet up and drunk plenty of fluids and nothing seems to help. I let her know that she can come to the ER and let them know that she is having contractions and get checked out. She stated that she doesn't want to waste anyones time. I let her know that she isn't doing that and it's not a big deal to come and get checked since she is having consistent contractions. She verbalized her understanding and will come in.

## 2023-03-27 NOTE — NURSING NOTE
Discussed signs of active labor and gave pt the option to stay and recheck her cervix in one hour. Pt crying and says she would rather go home. Pt says she will not be here on Friday for her scheduled iol because she has a baby shower that day. Pt encouraged to keep her appt for today and have Dr. Brock get her on her books if she is still wanting to be scheduled for iol after 39 weeks. Pt educated on why we cannot strip her membranes or assist with inducing labor due to her gestational age. Pt is upset but verbalized understanding. Reviewed warning signs in pregnancy and other attached clinical reference sheets. Pt verbalized understanding of information.

## 2023-03-27 NOTE — NON STRESS TEST
Moriah Nicole, a  at 38w3d with an TALIB of 2023, by Last Menstrual Period, was seen at [facility] LABOR DELIVERY for a nonstress test.    Chief Complaint   Patient presents with   • Contractions     Started at midnight       Patient Active Problem List   Diagnosis   • Lumbar disc herniation   • Lumbar radiculopathy   • Drug dependence affecting pregnancy in first trimester   • Supervision of normal pregnancy       Start Time: 936  Stop Time: 956    Interpretation A  Nonstress Test Interpretation A: Reactive  Comments A: Reviewed with Emily Gutierrez, MAVERICKC

## 2023-04-01 ENCOUNTER — HOSPITAL ENCOUNTER (OUTPATIENT)
Facility: HOSPITAL | Age: 23
Discharge: HOME | End: 2023-04-01
Attending: STUDENT IN AN ORGANIZED HEALTH CARE EDUCATION/TRAINING PROGRAM | Admitting: STUDENT IN AN ORGANIZED HEALTH CARE EDUCATION/TRAINING PROGRAM
Payer: COMMERCIAL

## 2023-04-01 VITALS
SYSTOLIC BLOOD PRESSURE: 118 MMHG | WEIGHT: 192 LBS | BODY MASS INDEX: 32.78 KG/M2 | HEART RATE: 87 BPM | RESPIRATION RATE: 18 BRPM | OXYGEN SATURATION: 99 % | TEMPERATURE: 97.5 F | HEIGHT: 64 IN | DIASTOLIC BLOOD PRESSURE: 57 MMHG

## 2023-04-01 PROCEDURE — 59025 FETAL NON-STRESS TEST: CPT

## 2023-04-01 PROCEDURE — G0463 HOSPITAL OUTPT CLINIC VISIT: HCPCS

## 2023-04-01 NOTE — NON STRESS TEST
Moriahadalid Nicole, a  at 39w1d with an TALIB of 2023, by Last Menstrual Period, was seen at [facility] LABOR DELIVERY for a nonstress test.    Chief Complaint   Patient presents with   • Contractions     Denies vaginal bleeding or ROM, reports good fetal movement       Patient Active Problem List   Diagnosis   • Lumbar disc herniation   • Lumbar radiculopathy   • Drug dependence affecting pregnancy in first trimester   • Supervision of normal pregnancy       Start Time: 0900  Stop Time: 1000    Interpretation A  Nonstress Test Interpretation A: Reactive  Comments A: strip reviewed with JOE Dewitt RNC

## 2023-04-01 NOTE — DISCHARGE INSTRUCTIONS
Return to labor and delivery for regular contractions every 2-3 mins for 2 hours, if your water breaks, vaginal bleeding, decreased fetal movement.  Keep next scheduled appt and call [phone] for any concerns or problems.

## 2023-04-02 ENCOUNTER — HOSPITAL ENCOUNTER (OUTPATIENT)
Facility: HOSPITAL | Age: 23
Discharge: HOME | End: 2023-04-02
Attending: STUDENT IN AN ORGANIZED HEALTH CARE EDUCATION/TRAINING PROGRAM | Admitting: STUDENT IN AN ORGANIZED HEALTH CARE EDUCATION/TRAINING PROGRAM
Payer: COMMERCIAL

## 2023-04-02 VITALS
DIASTOLIC BLOOD PRESSURE: 65 MMHG | HEART RATE: 96 BPM | TEMPERATURE: 97.7 F | OXYGEN SATURATION: 99 % | SYSTOLIC BLOOD PRESSURE: 112 MMHG

## 2023-04-02 LAB — A1 MICROGLOB PLACENTAL VAG QL: NEGATIVE

## 2023-04-02 PROCEDURE — G0463 HOSPITAL OUTPT CLINIC VISIT: HCPCS

## 2023-04-02 PROCEDURE — 84112 EVAL AMNIOTIC FLUID PROTEIN: CPT | Performed by: STUDENT IN AN ORGANIZED HEALTH CARE EDUCATION/TRAINING PROGRAM

## 2023-04-02 PROCEDURE — 59025 FETAL NON-STRESS TEST: CPT

## 2023-04-02 NOTE — NON STRESS TEST
Moriahadalid Nicole, a  at 39w2d with an TALIB of 2023, by Last Menstrual Period, was seen at [facility] LABOR DELIVERY for a nonstress test.    Chief Complaint   Patient presents with   • Pelvic Pain   • Back Pain       Patient Active Problem List   Diagnosis   • Lumbar disc herniation   • Lumbar radiculopathy   • Drug dependence affecting pregnancy in first trimester   • Supervision of normal pregnancy       Start Time: 1110  Stop Time: 1130    Interpretation A  Nonstress Test Interpretation A: Reactive  Comments A: Reviewed with JOE De Los Santos RN.

## 2023-04-03 ENCOUNTER — PATIENT OUTREACH (OUTPATIENT)
Dept: LABOR AND DELIVERY | Facility: HOSPITAL | Age: 23
End: 2023-04-03
Payer: COMMERCIAL

## 2023-04-03 ENCOUNTER — ANESTHESIA (OUTPATIENT)
Dept: LABOR AND DELIVERY | Facility: HOSPITAL | Age: 23
End: 2023-04-03
Payer: COMMERCIAL

## 2023-04-03 ENCOUNTER — HOSPITAL ENCOUNTER (INPATIENT)
Facility: HOSPITAL | Age: 23
LOS: 1 days | Discharge: HOME | End: 2023-04-04
Attending: STUDENT IN AN ORGANIZED HEALTH CARE EDUCATION/TRAINING PROGRAM | Admitting: STUDENT IN AN ORGANIZED HEALTH CARE EDUCATION/TRAINING PROGRAM
Payer: COMMERCIAL

## 2023-04-03 ENCOUNTER — ANESTHESIA EVENT (OUTPATIENT)
Dept: LABOR AND DELIVERY | Facility: HOSPITAL | Age: 23
End: 2023-04-03
Payer: COMMERCIAL

## 2023-04-03 DIAGNOSIS — Z34.83 ENCOUNTER FOR SUPERVISION OF OTHER NORMAL PREGNANCY IN THIRD TRIMESTER: ICD-10-CM

## 2023-04-03 PROBLEM — Z37.9 NORMAL LABOR: Status: ACTIVE | Noted: 2023-04-03

## 2023-04-03 PROBLEM — Z34.90 ENCOUNTER FOR ELECTIVE INDUCTION OF LABOR: Status: ACTIVE | Noted: 2023-04-03

## 2023-04-03 LAB
ABO GROUP BLD: NORMAL
AMPHET+METHAMPHET UR QL: NEGATIVE
AMPHETAMINES UR QL: NEGATIVE
BARBITURATES UR QL SCN: NEGATIVE
BENZODIAZ UR QL SCN: NEGATIVE
BLD GP AB SCN SERPL QL: NEGATIVE
BUPRENORPHINE SERPL-MCNC: NEGATIVE NG/ML
CANNABINOIDS SERPL QL: POSITIVE
COCAINE UR QL: NEGATIVE
DEPRECATED RDW RBC AUTO: 41.9 FL (ref 37–54)
ERYTHROCYTE [DISTWIDTH] IN BLOOD BY AUTOMATED COUNT: 14.2 % (ref 12.3–15.4)
HCT VFR BLD AUTO: 38.6 % (ref 34–46.6)
HGB BLD-MCNC: 12.9 G/DL (ref 12–15.9)
Lab: NORMAL
MCH RBC QN AUTO: 27.6 PG (ref 26.6–33)
MCHC RBC AUTO-ENTMCNC: 33.4 G/DL (ref 31.5–35.7)
MCV RBC AUTO: 82.7 FL (ref 79–97)
METHADONE UR QL SCN: NEGATIVE
OPIATES UR QL: NEGATIVE
OXYCODONE UR QL SCN: NEGATIVE
PCP UR QL SCN: NEGATIVE
PLATELET # BLD AUTO: 302 10*3/MM3 (ref 140–450)
PMV BLD AUTO: 10.4 FL (ref 6–12)
PROPOXYPH UR QL: NEGATIVE
RBC # BLD AUTO: 4.67 10*6/MM3 (ref 3.77–5.28)
RH BLD: POSITIVE
T&S EXPIRATION DATE: NORMAL
TRICYCLICS UR QL SCN: NEGATIVE
WBC NRBC COR # BLD: 12.51 10*3/MM3 (ref 3.4–10.8)

## 2023-04-03 PROCEDURE — 80306 DRUG TEST PRSMV INSTRMNT: CPT | Performed by: STUDENT IN AN ORGANIZED HEALTH CARE EDUCATION/TRAINING PROGRAM

## 2023-04-03 PROCEDURE — C1755 CATHETER, INTRASPINAL: HCPCS

## 2023-04-03 PROCEDURE — G0480 DRUG TEST DEF 1-7 CLASSES: HCPCS | Performed by: STUDENT IN AN ORGANIZED HEALTH CARE EDUCATION/TRAINING PROGRAM

## 2023-04-03 PROCEDURE — 59400 OBSTETRICAL CARE: CPT | Performed by: STUDENT IN AN ORGANIZED HEALTH CARE EDUCATION/TRAINING PROGRAM

## 2023-04-03 PROCEDURE — 25010000002 FENTANYL CITRATE (PF) 100 MCG/2ML SOLUTION

## 2023-04-03 PROCEDURE — 86850 RBC ANTIBODY SCREEN: CPT | Performed by: STUDENT IN AN ORGANIZED HEALTH CARE EDUCATION/TRAINING PROGRAM

## 2023-04-03 PROCEDURE — 85027 COMPLETE CBC AUTOMATED: CPT | Performed by: STUDENT IN AN ORGANIZED HEALTH CARE EDUCATION/TRAINING PROGRAM

## 2023-04-03 PROCEDURE — 86900 BLOOD TYPING SEROLOGIC ABO: CPT | Performed by: STUDENT IN AN ORGANIZED HEALTH CARE EDUCATION/TRAINING PROGRAM

## 2023-04-03 PROCEDURE — 86901 BLOOD TYPING SEROLOGIC RH(D): CPT | Performed by: STUDENT IN AN ORGANIZED HEALTH CARE EDUCATION/TRAINING PROGRAM

## 2023-04-03 RX ORDER — IBUPROFEN 800 MG/1
800 TABLET ORAL EVERY 8 HOURS
Status: DISCONTINUED | OUTPATIENT
Start: 2023-04-03 | End: 2023-04-03 | Stop reason: HOSPADM

## 2023-04-03 RX ORDER — SODIUM CHLORIDE, SODIUM LACTATE, POTASSIUM CHLORIDE, CALCIUM CHLORIDE 600; 310; 30; 20 MG/100ML; MG/100ML; MG/100ML; MG/100ML
125 INJECTION, SOLUTION INTRAVENOUS CONTINUOUS
Status: DISCONTINUED | OUTPATIENT
Start: 2023-04-03 | End: 2023-04-03

## 2023-04-03 RX ORDER — HYDROCODONE BITARTRATE AND ACETAMINOPHEN 5; 325 MG/1; MG/1
1 TABLET ORAL EVERY 4 HOURS PRN
Status: DISCONTINUED | OUTPATIENT
Start: 2023-04-03 | End: 2023-04-04 | Stop reason: HOSPADM

## 2023-04-03 RX ORDER — SODIUM CHLORIDE 0.9 % (FLUSH) 0.9 %
1-10 SYRINGE (ML) INJECTION AS NEEDED
Status: DISCONTINUED | OUTPATIENT
Start: 2023-04-03 | End: 2023-04-04 | Stop reason: HOSPADM

## 2023-04-03 RX ORDER — ACETAMINOPHEN 500 MG
1000 TABLET ORAL EVERY 6 HOURS
Status: DISCONTINUED | OUTPATIENT
Start: 2023-04-03 | End: 2023-04-03 | Stop reason: HOSPADM

## 2023-04-03 RX ORDER — LIDOCAINE HYDROCHLORIDE 10 MG/ML
5 INJECTION, SOLUTION EPIDURAL; INFILTRATION; INTRACAUDAL; PERINEURAL AS NEEDED
Status: DISCONTINUED | OUTPATIENT
Start: 2023-04-03 | End: 2023-04-03 | Stop reason: HOSPADM

## 2023-04-03 RX ORDER — ONDANSETRON 2 MG/ML
4 INJECTION INTRAMUSCULAR; INTRAVENOUS EVERY 6 HOURS PRN
Status: DISCONTINUED | OUTPATIENT
Start: 2023-04-03 | End: 2023-04-03 | Stop reason: HOSPADM

## 2023-04-03 RX ORDER — OXYTOCIN/0.9 % SODIUM CHLORIDE 30/500 ML
999 PLASTIC BAG, INJECTION (ML) INTRAVENOUS ONCE
Status: DISCONTINUED | OUTPATIENT
Start: 2023-04-03 | End: 2023-04-03

## 2023-04-03 RX ORDER — FERROUS SULFATE TAB EC 324 MG (65 MG FE EQUIVALENT) 324 (65 FE) MG
324 TABLET DELAYED RESPONSE ORAL 2 TIMES DAILY WITH MEALS
Status: DISCONTINUED | OUTPATIENT
Start: 2023-04-03 | End: 2023-04-04 | Stop reason: HOSPADM

## 2023-04-03 RX ORDER — CALCIUM CARBONATE 200(500)MG
2 TABLET,CHEWABLE ORAL 3 TIMES DAILY PRN
Status: DISCONTINUED | OUTPATIENT
Start: 2023-04-03 | End: 2023-04-04 | Stop reason: HOSPADM

## 2023-04-03 RX ORDER — OXYTOCIN/0.9 % SODIUM CHLORIDE 30/500 ML
250 PLASTIC BAG, INJECTION (ML) INTRAVENOUS CONTINUOUS
Status: DISCONTINUED | OUTPATIENT
Start: 2023-04-03 | End: 2023-04-03

## 2023-04-03 RX ORDER — SODIUM CHLORIDE 0.9 % (FLUSH) 0.9 %
10 SYRINGE (ML) INJECTION AS NEEDED
Status: DISCONTINUED | OUTPATIENT
Start: 2023-04-03 | End: 2023-04-03 | Stop reason: HOSPADM

## 2023-04-03 RX ORDER — METHYLERGONOVINE MALEATE 0.2 MG/ML
200 INJECTION INTRAVENOUS ONCE AS NEEDED
Status: DISCONTINUED | OUTPATIENT
Start: 2023-04-03 | End: 2023-04-03 | Stop reason: HOSPADM

## 2023-04-03 RX ORDER — SODIUM CHLORIDE 0.9 % (FLUSH) 0.9 %
3-10 SYRINGE (ML) INJECTION AS NEEDED
Status: DISCONTINUED | OUTPATIENT
Start: 2023-04-03 | End: 2023-04-03 | Stop reason: HOSPADM

## 2023-04-03 RX ORDER — MISOPROSTOL 200 UG/1
800 TABLET ORAL ONCE AS NEEDED
Status: DISCONTINUED | OUTPATIENT
Start: 2023-04-03 | End: 2023-04-03

## 2023-04-03 RX ORDER — MISOPROSTOL 100 MCG
50 TABLET ORAL EVERY 4 HOURS
Status: DISCONTINUED | OUTPATIENT
Start: 2023-04-03 | End: 2023-04-03

## 2023-04-03 RX ORDER — FENTANYL CITRATE 50 UG/ML
INJECTION, SOLUTION INTRAMUSCULAR; INTRAVENOUS AS NEEDED
Status: DISCONTINUED | OUTPATIENT
Start: 2023-04-03 | End: 2023-04-03 | Stop reason: SURG

## 2023-04-03 RX ORDER — CARBOPROST TROMETHAMINE 250 UG/ML
250 INJECTION, SOLUTION INTRAMUSCULAR
Status: DISCONTINUED | OUTPATIENT
Start: 2023-04-03 | End: 2023-04-03

## 2023-04-03 RX ORDER — METHYLERGONOVINE MALEATE 0.2 MG/ML
200 INJECTION INTRAVENOUS ONCE AS NEEDED
Status: DISCONTINUED | OUTPATIENT
Start: 2023-04-03 | End: 2023-04-03

## 2023-04-03 RX ORDER — ONDANSETRON 4 MG/1
4 TABLET, FILM COATED ORAL EVERY 6 HOURS PRN
Status: DISCONTINUED | OUTPATIENT
Start: 2023-04-03 | End: 2023-04-03 | Stop reason: HOSPADM

## 2023-04-03 RX ORDER — PROMETHAZINE HYDROCHLORIDE 25 MG/1
25 TABLET ORAL EVERY 6 HOURS PRN
Status: DISCONTINUED | OUTPATIENT
Start: 2023-04-03 | End: 2023-04-03 | Stop reason: HOSPADM

## 2023-04-03 RX ORDER — OXYTOCIN/0.9 % SODIUM CHLORIDE 30/500 ML
999 PLASTIC BAG, INJECTION (ML) INTRAVENOUS ONCE
Status: COMPLETED | OUTPATIENT
Start: 2023-04-03 | End: 2023-04-03

## 2023-04-03 RX ORDER — BUTORPHANOL TARTRATE 1 MG/ML
1 INJECTION, SOLUTION INTRAMUSCULAR; INTRAVENOUS
Status: DISCONTINUED | OUTPATIENT
Start: 2023-04-03 | End: 2023-04-03 | Stop reason: HOSPADM

## 2023-04-03 RX ORDER — PRENATAL VIT/IRON FUM/FOLIC AC 27MG-0.8MG
1 TABLET ORAL DAILY
Status: DISCONTINUED | OUTPATIENT
Start: 2023-04-03 | End: 2023-04-04 | Stop reason: HOSPADM

## 2023-04-03 RX ORDER — BISACODYL 10 MG
10 SUPPOSITORY, RECTAL RECTAL DAILY PRN
Status: DISCONTINUED | OUTPATIENT
Start: 2023-04-04 | End: 2023-04-04 | Stop reason: HOSPADM

## 2023-04-03 RX ORDER — HYDROCORTISONE 25 MG/G
1 CREAM TOPICAL AS NEEDED
Status: DISCONTINUED | OUTPATIENT
Start: 2023-04-03 | End: 2023-04-04 | Stop reason: HOSPADM

## 2023-04-03 RX ORDER — MISOPROSTOL 200 UG/1
800 TABLET ORAL AS NEEDED
Status: DISCONTINUED | OUTPATIENT
Start: 2023-04-03 | End: 2023-04-03 | Stop reason: HOSPADM

## 2023-04-03 RX ORDER — CARBOPROST TROMETHAMINE 250 UG/ML
250 INJECTION, SOLUTION INTRAMUSCULAR AS NEEDED
Status: DISCONTINUED | OUTPATIENT
Start: 2023-04-03 | End: 2023-04-03 | Stop reason: HOSPADM

## 2023-04-03 RX ORDER — SODIUM CHLORIDE 0.9 % (FLUSH) 0.9 %
10 SYRINGE (ML) INJECTION EVERY 12 HOURS SCHEDULED
Status: DISCONTINUED | OUTPATIENT
Start: 2023-04-03 | End: 2023-04-03 | Stop reason: HOSPADM

## 2023-04-03 RX ORDER — IBUPROFEN 600 MG/1
600 TABLET ORAL EVERY 6 HOURS PRN
Status: DISCONTINUED | OUTPATIENT
Start: 2023-04-03 | End: 2023-04-04 | Stop reason: HOSPADM

## 2023-04-03 RX ORDER — DOCUSATE SODIUM 100 MG/1
100 CAPSULE, LIQUID FILLED ORAL 2 TIMES DAILY
Status: DISCONTINUED | OUTPATIENT
Start: 2023-04-03 | End: 2023-04-04 | Stop reason: HOSPADM

## 2023-04-03 RX ORDER — ONDANSETRON 2 MG/ML
4 INJECTION INTRAMUSCULAR; INTRAVENOUS EVERY 6 HOURS PRN
Status: DISCONTINUED | OUTPATIENT
Start: 2023-04-03 | End: 2023-04-04 | Stop reason: HOSPADM

## 2023-04-03 RX ORDER — SODIUM CHLORIDE 0.9 % (FLUSH) 0.9 %
3 SYRINGE (ML) INJECTION EVERY 12 HOURS SCHEDULED
Status: DISCONTINUED | OUTPATIENT
Start: 2023-04-03 | End: 2023-04-03 | Stop reason: HOSPADM

## 2023-04-03 RX ORDER — ACETAMINOPHEN 325 MG/1
650 TABLET ORAL EVERY 6 HOURS PRN
Status: DISCONTINUED | OUTPATIENT
Start: 2023-04-03 | End: 2023-04-04 | Stop reason: HOSPADM

## 2023-04-03 RX ORDER — PROMETHAZINE HYDROCHLORIDE 12.5 MG/1
12.5 SUPPOSITORY RECTAL EVERY 6 HOURS PRN
Status: DISCONTINUED | OUTPATIENT
Start: 2023-04-03 | End: 2023-04-03 | Stop reason: HOSPADM

## 2023-04-03 RX ORDER — BUTORPHANOL TARTRATE 1 MG/ML
2 INJECTION, SOLUTION INTRAMUSCULAR; INTRAVENOUS
Status: DISCONTINUED | OUTPATIENT
Start: 2023-04-03 | End: 2023-04-03 | Stop reason: HOSPADM

## 2023-04-03 RX ORDER — HYDROCODONE BITARTRATE AND ACETAMINOPHEN 10; 325 MG/1; MG/1
1 TABLET ORAL EVERY 4 HOURS PRN
Status: DISCONTINUED | OUTPATIENT
Start: 2023-04-03 | End: 2023-04-04 | Stop reason: HOSPADM

## 2023-04-03 RX ORDER — ONDANSETRON 4 MG/1
4 TABLET, FILM COATED ORAL EVERY 6 HOURS PRN
Status: DISCONTINUED | OUTPATIENT
Start: 2023-04-03 | End: 2023-04-04 | Stop reason: HOSPADM

## 2023-04-03 RX ADMIN — IBUPROFEN 600 MG: 600 TABLET, FILM COATED ORAL at 15:40

## 2023-04-03 RX ADMIN — FERROUS SULFATE TAB EC 324 MG (65 MG FE EQUIVALENT) 324 MG: 324 (65 FE) TABLET DELAYED RESPONSE at 18:35

## 2023-04-03 RX ADMIN — ACETAMINOPHEN 650 MG: 325 TABLET ORAL at 15:40

## 2023-04-03 RX ADMIN — OXYTOCIN-SODIUM CHLORIDE 0.9% IV SOLN 30 UNIT/500ML 999 ML/HR: 30-0.9/5 SOLUTION at 07:25

## 2023-04-03 RX ADMIN — FENTANYL CITRATE 10 MCG: 50 INJECTION, SOLUTION INTRAMUSCULAR; INTRAVENOUS at 07:15

## 2023-04-03 RX ADMIN — IBUPROFEN 600 MG: 600 TABLET, FILM COATED ORAL at 21:21

## 2023-04-03 RX ADMIN — SODIUM CHLORIDE, POTASSIUM CHLORIDE, SODIUM LACTATE AND CALCIUM CHLORIDE 999 ML/HR: 600; 310; 30; 20 INJECTION, SOLUTION INTRAVENOUS at 06:14

## 2023-04-03 RX ADMIN — Medication: at 16:47

## 2023-04-03 RX ADMIN — MISOPROSTOL 800 MCG: 200 TABLET ORAL at 07:45

## 2023-04-03 RX ADMIN — DOCUSATE SODIUM 100 MG: 100 CAPSULE, LIQUID FILLED ORAL at 20:56

## 2023-04-03 NOTE — OUTREACH NOTE
[facility]  IP Postpartum    Questions/Answers    Flowsheet Row Responses   Best Method for Contacting Cell   Support Person Present Yes   Does the patient have a car seat at the hospital Yes   Delivery Note Reviewed Reviewed   Were birth expectations met? Yes   Is there a need for additional support/resources? No   Is additional support needed? No   Any questions or concerns? No   Any concerns related discharge meds/ability to  prescriptions? No   Confirm Postpartum OB appointment No  [patient just delivered today]   Confirm initial well-child Pediatrician appointment date/time: No   Does patient have transportation to appointments? Yes   Any other assistance needed to ensure she is able to attend appointments? No   Does patient have supplies needed at home for  care? Breast Pump, Clothing, Crib, Diapers          Patient delivered baby today. Was seen inpatient. Will talk to avtar on 23 for pp check-in      Franny Kingston RN  Maternity Nurse Navigator    4/3/2023, 10:57 CDT         lengthy discussion with patient with Dr. Ortiz regarding likelihood of giardia given greasy diarrhea and gas for long period of time associated with eating fat food. will give flagyl x1 week, protonix, and instructions on importance of GI follow-up which he was instructed to have last time. Will give patient a cup for stool to bring either to his PMD for GI clinic for testing for giardia and will give flagyl in the meantime. patient understands and agrees. -Edie Blank PA-C

## 2023-04-03 NOTE — ANESTHESIA PREPROCEDURE EVALUATION
Anesthesia Evaluation     Patient summary reviewed and Nursing notes reviewed                Airway   Mallampati: II  TM distance: >3 FB  Neck ROM: full  No difficulty expected  Dental - normal exam     Pulmonary - negative pulmonary ROS and normal exam   Cardiovascular - negative cardio ROS and normal exam        Neuro/Psych  (+) psychiatric history Anxiety,    GI/Hepatic/Renal/Endo - negative ROS     Musculoskeletal (-) negative ROS    Abdominal   (+) obese,    Substance History - negative use     OB/GYN    (+) Pregnant,         Other                        Anesthesia Plan    ASA 1     epidural     intravenous induction     Anesthetic plan, risks, benefits, and alternatives have been provided, discussed and informed consent has been obtained with: patient.        CODE STATUS:    Code Status (Patient has no pulse and is not breathing): CPR (Attempt to Resuscitate)  Medical Interventions (Patient has pulse or is breathing): Full Support  Release to patient: Routine Release

## 2023-04-03 NOTE — L&D DELIVERY NOTE
[facility]   Vaginal Delivery Note    Patient Name: Moriah Nicole  : 2000  MRN: [id number]    Date of Delivery: 4/3/2023     Diagnosis     Pre & Post-Delivery:  Intrauterine pregnancy at 39w3d  Labor status: Spontaneous Onset of Labor     Encounter for elective induction of labor    Normal labor     (normal spontaneous vaginal delivery)             Problem List    Transfer to Postpartum     Review the Delivery Report for details.     Delivery     Delivery: Vaginal, Spontaneous     YOB: 2022    Time of Birth:  Gestational Age 7:16 AM   39w3d     Anesthesia: Epidural     Delivering clinician: Gita Brock    Forceps?   No   Vacuum? No    Shoulder dystocia present: No        Delivery narrative:  Patient pushed to deliver a viable 3200g male from the FLAVIA position over an intact perineum via  under epidural anesthesia (just placed, not yet effective) on 4/3/2023 at 0716.  No noted nuchal cord.  Right anterior shoulder was delivered with ease, followed by left posterior shoulder.  Body was then delivered with gentle traction.  Mouth and nose bulb suctioned.  3 vessel cord clamped x2 and cut after 30 seconds of delayed clamping.  Baby was placed on mother's chest.  Cord blood was obtained and sent.  Placenta delivered spontaneously intact and Pitocin was started.  Cervix, vagina, and perineum were examined and no laceration was noted.  EBL 200mL; QBL pending, please see nursing documentation.  Apgar scores 9/9.  Mother and infant stable.        Infant     Findings: male  infant     Infant observations: Weight: 3200 g (7 lb 0.9 oz)   Length: 20.5  in  Observations/Comments:        Apgars: 9  @ 1 minute /    9  @ 5 minutes   Infant Name: Gutierrez     Placenta & Cord         Placenta delivered  Expressed  at   4/3/2023  7:25 AM     Cord: 3 vessels  present.   Nuchal Cord?  no   Cord blood obtained: Yes    Cord gases obtained:  No    Cord gas results: Venous:  No results found for:  PHCVEN    Arterial:  No results found for: PHCART     Repair      Episiotomy: None     No    Lacerations: No   Estimated Blood Loss:  200      Quantitative Blood Loss:          Complications     none    Disposition     Mother to Mother Baby/Postpartum  in stable condition currently.  Baby to remains with mom  in stable condition currently.    Gita Brock DO  04/03/23  08:40 CDT

## 2023-04-03 NOTE — ANESTHESIA PROCEDURE NOTES
Epidural Block      Patient reassessed immediately prior to procedure    Start Time: 4/3/2023 6:49 AM  Stop Time: 4/3/2023 7:14 AM  Indication:procedure for pain  Performed By  CRNA/BONY: Yari Sanchez CRNASRNA: Bhumi Camejo SRNA  Preanesthetic Checklist  Completed: patient identified, IV checked, site marked, risks and benefits discussed, surgical consent, monitors and equipment checked, pre-op evaluation and timeout performed  Additional Notes  Test dose given at 0710  At 0715, test dose negative   10 mL Bolus dose of 0.125% bupivacaine given at 0715 with 10 mcg of fentanyl   Prep:  Pt Position:sitting  Sterile Tech:cap, gloves, mask and sterile barrier  Prep:povidone-iodine 7.5% surgical scrub  Monitoring:blood pressure monitoring, continuous pulse oximetry and EKG  Epidural Block Procedure:  Approach:midline  Guidance:landmark technique and palpation technique  Location:lumbar  Level:3-4  Needle Type:Tuohy  Needle Gauge:17 G  Cath Size: 19 G  Loss of Resistance Medium: saline  Loss of Resistance: 8cm  Cath Depth at skin:12 cm  Paresthesia: none  Aspiration:negative  Test Dose:negative  Post Assessment:  Dressing:biopatch applied, occlusive dressing applied and secured with tape  Pt Tolerance:patient tolerated the procedure well with no apparent complications  Complications:no

## 2023-04-03 NOTE — PLAN OF CARE
Goal Outcome Evaluation:  Plan of Care Reviewed With: patient, significant other        Progress: improving  Outcome Evaluation: VSS. fundus firm, rubra light, pt denies pain  Problem: Pain (Anesthesia/Analgesia, Neuraxial)  Goal: Effective Pain Control  Outcome: Unable to Meet, Plan Revised

## 2023-04-03 NOTE — H&P
HISTORY & PHYSICAL - Obstetrics  [facility]    Name: Moriah Nicole  MRN: [id number]  Location:   Date: 2023  CSN: [id number]      CHIEF COMPLAINT:  Increasingly painful ctx    HISTORY OF PRESENT ILLNESS  Moriah Nicole is a 22 y.o.  at 39w3d gestational age by LMP = 1st TRI US suggesting Estimated Date of Delivery: 23.  The patient presents with a chief complaint of increasingly painful and frequent ctx.  Denies LOF.  Denies  vaginal bleeding.  Reported good FM.    On presentation 5.5 cm at 0405. SROM shortly after presentation, around 0630, epidural placed and immediately felt urge to push and pushed to deliver infant.    Patient denies any chest pain, palpitations, headaches, lightheadedness, shortness of breath, cough, nausea, vomiting, diarrhea, constipation, fever, or chills.    ROS  Review of Systems   Constitutional: Negative.    HENT: Negative.    Respiratory: Negative.    Cardiovascular: Negative.    Gastrointestinal: Negative.    Genitourinary: Negative.    Skin: Negative.    Psychiatric/Behavioral: Negative.        PRENATAL LAB RESULTS  Prenatal labs reviewed  External Prenatal Results     Pregnancy Outside Results - Transcribed From Office Records - See Scanned Records For Details     Test Value Date Time    ABO  O  23 0556    Rh  Positive  23 0556    Antibody Screen  Negative  23 0556       Negative  22 1619       Negative  22 1517    Varicella IgG       Rubella  3.77 index 22 1517    Hgb  12.9 g/dL 23 0556       11.4 g/dL 23 1005       12.3 g/dL 22 1619       11.7 g/dL 22 0110       13.5 g/dL 22 1517       13.7 g/dL 22 1056       13.2 g/dL 22 1256    Hct  38.6 % 23 0556       32.9 % 23 1005       36.2 % 22 1619       33.8 % 22 0110       40.8 % 22 1517       40.7 % 22 1056       39.4 % 22 1256    Glucose Fasting GTT       Glucose  Tolerance Test 1 hour       Glucose Tolerance Test 3 hour       Gonorrhea (discrete)  Negative  08/25/22 1517    Chlamydia (discrete)  Negative  08/25/22 1517    RPR  Non-Reactive  08/25/22 1517    VDRL       Syphilis Antibody       HBsAg  Non-Reactive  08/25/22 1517    Herpes Simplex Virus PCR       Herpes Simplex VIrus Culture       HIV  Non-Reactive  08/25/22 1517    Hep C RNA Quant PCR       Hep C Antibody  Non-Reactive  08/25/22 1517    AFP       Group B Strep  Negative  03/09/23 1100    GBS Susceptibility to Clindamycin       GBS Susceptibility to Erythromycin       Fetal Fibronectin       Genetic Testing, Maternal Blood             Drug Screening     Test Value Date Time    Urine Drug Screen       Amphetamine Screen  Negative  04/03/23 0542       Negative  09/14/22 1618       Negative  08/25/22 1517    Barbiturate Screen  Negative  04/03/23 0542       Negative  09/14/22 1618       Negative  08/25/22 1517    Benzodiazepine Screen  Negative  04/03/23 0542       Negative  09/14/22 1618       Negative  08/25/22 1517    Methadone Screen  Negative  04/03/23 0542       Negative  09/14/22 1618       Negative  08/25/22 1517    Phencyclidine Screen  Negative  04/03/23 0542       Negative  09/14/22 1618       Negative  08/25/22 1517    Opiates Screen  Negative  04/03/23 0542       Negative  09/14/22 1618       Negative  08/25/22 1517    THC Screen  Positive  04/03/23 0542       Positive  09/14/22 1618       Positive  08/25/22 1517    Cocaine Screen       Propoxyphene Screen  Negative  04/03/23 0542       Negative  09/14/22 1618       Negative  08/25/22 1517    Buprenorphine Screen  Negative  04/03/23 0542       Negative  09/14/22 1618       Negative  08/25/22 1517    Methamphetamine Screen       Oxycodone Screen  Negative  04/03/23 0542       Negative  09/14/22 1618       Negative  08/25/22 1517    Tricyclic Antidepressants Screen  Negative  04/03/23 0542       Negative  09/14/22 1618       Negative  08/25/22 1517           Legend    ^: Historical                        PRENATAL RISK FACTORS   Problems (from 22 to present)     Problem Noted Resolved    Supervision of normal pregnancy 2023 by Dai Painting MD No    Drug dependence affecting pregnancy in first trimester 2022 by Dai Painting MD No    Overview Signed 2022  4:11 PM by Dai Painting MD     +THC on NOB visit         Nausea and vomiting during pregnancy prior to 22 weeks gestation 11/10/2022 by Melissa Bull APRN 3/9/2023 by Melissa Bull APRN    Multigravida in second trimester 11/10/2022 by Melissa Bull APRN 2023 by Katharine Torres APRN          DATING SUMMARY  LMP (22) -- TALIB 23  1 TRI US (22 at 6w4d) -- TALIB 23    OB HISTORY  OB History    Para Term  AB Living   2 2 2     2   SAB IAB Ectopic Molar Multiple Live Births           0 2      # Outcome Date GA Lbr Neo/2nd Weight Sex Delivery Anes PTL Lv   2 Term 23 39w3d 175:15 / 00:01 3200 g (7 lb 0.9 oz) M Vag-Spont EPI N MARIO   1 Term 20 39w0d 04:28 / 02:13 2810 g (6 lb 3.1 oz) M Vag-Spont EPI N MARIO     GYN HISTORY  Denies h/o sexually transmitted infections/pelvic inflammatory disease  Denies h/o abnormal pap smears, last pap was none, needs PP pap  Denies h/o gynecologic surgeries, including biopsies of the cervix    PAST MEDICAL HISTORY  Past Medical History:   Diagnosis Date   • ADHD    • Allergic    • Anxiety    • Lumbar radiculopathy 11/15/2021     PAST SURGICAL HISTORY  Past Surgical History:   Procedure Laterality Date   • LUMBAR DISCECTOMY Right 2021    Procedure: LUMBAR DISCECTOMY L4-5 RIGHT;  Surgeon: Bony Orellana MD;  Location: [facility];  Service: Neurosurgery;  Laterality: Right;     FAMILY HISTORY  Family History   Problem Relation Age of Onset   • Diabetes Mother    • Stroke Mother    • Fibromyalgia Mother    • Hypertension Father    •  Heart attack Father    • Heart disease Father    • Schizophrenia Brother    • Bipolar disorder Brother    • No Known Problems Brother    • No Known Problems Son    • Hepatitis Maternal Grandmother    • Schizophrenia Maternal Grandmother    • Heart disease Paternal Grandmother    • Diabetes Paternal Grandmother    • Heart failure Paternal Grandmother    • Heart disease Paternal Grandfather    • Heart attack Paternal Grandfather      SOCIAL HISTORY  Social History     Socioeconomic History   • Marital status:    • Highest education level: High school graduate   Tobacco Use   • Smoking status: Every Day     Packs/day: 0.50     Types: Cigarettes     Last attempt to quit: 2022     Years since quittin.4   • Smokeless tobacco: Never   Vaping Use   • Vaping Use: Some days   • Substances: Nicotine, Flavoring   • Devices: Disposable, Pre-filled pod   Substance and Sexual Activity   • Alcohol use: Not Currently   • Drug use: Not Currently     Types: Marijuana   • Sexual activity: Yes     Partners: Male     Comment: unsure about last pap smear     ALLERGIES  No Known Allergies  HOME MEDICATIONS  Prior to Admission medications    Medication Sig Start Date End Date Taking? Authorizing Provider   Prenatal Vit-Fe Fumarate-FA (Prenatal Vitamin) 27-0.8 MG tablet Take 1 tablet by mouth Daily.   Yes Provider, Historical, MD     PHYSICAL EXAM  /58 (BP Location: Left arm, Patient Position: Sitting)   Pulse 93   Temp 99 °F (37.2 °C) (Oral)   Resp 16   Wt 87.1 kg (192 lb)   LMP 2022 (Exact Date)   SpO2 95%   Breastfeeding Unknown   BMI 33.48 kg/m²   General: No acute distress. Well developed, well nourished. Pleasant.  Heart: Regular rate and rhythm.   Lungs: No increased work of breathing  Abdomen: Soft, nontender to palpation, enlarged by gravid uterus.    NST Review  Reactive and reassuring, regular ctx on toco    SVE: 5.5 cm, cephalic by Leopolds    IMPRESSION  Moriah Nicole is a 22 y.o.   at 39w3d admitted with term spontaneous labor.    PLAN  1.  IUP at 39w3d with term spontaneous labor  - Admit: Labor and Delivery  - Attending: Dr. Brock  - Condition: Stable  - Vitals: per protocol  - Activity: ad massimo  - Nursing: Continuous electronic fetal monitoring, as per protocol  - Diet: Clears  - IV fluids:  mL/hr  - Meds: prn stadol, tylenol, zofran  - Allergies: NKDA  - Labs: CBC, T&S, UDS  - GBS: neg.  Antibiotics:  Not indicated  - Moriah Nicole and I have discussed pain goals for this hospitalization after reviewing her current clinical condition, medical history and prior pain experiences.  The goal is to keep her pain level manageable.  Patient does desire an epidural  - Anticipate           This document has been electronically signed by Gita Brock DO on April 3, 2023 13:10 CDT

## 2023-04-04 VITALS
RESPIRATION RATE: 20 BRPM | BODY MASS INDEX: 33.48 KG/M2 | WEIGHT: 192 LBS | OXYGEN SATURATION: 97 % | SYSTOLIC BLOOD PRESSURE: 124 MMHG | TEMPERATURE: 97.7 F | DIASTOLIC BLOOD PRESSURE: 69 MMHG | HEART RATE: 96 BPM

## 2023-04-04 PROCEDURE — 0503F POSTPARTUM CARE VISIT: CPT | Performed by: STUDENT IN AN ORGANIZED HEALTH CARE EDUCATION/TRAINING PROGRAM

## 2023-04-04 RX ORDER — IBUPROFEN 600 MG/1
600 TABLET ORAL EVERY 6 HOURS PRN
Qty: 40 TABLET | Refills: 1 | Status: SHIPPED | OUTPATIENT
Start: 2023-04-04

## 2023-04-04 RX ORDER — ACETAMINOPHEN 325 MG/1
650 TABLET ORAL EVERY 6 HOURS PRN
Qty: 50 TABLET | Refills: 1 | Status: SHIPPED | OUTPATIENT
Start: 2023-04-04

## 2023-04-04 RX ORDER — HYDROXYZINE HYDROCHLORIDE 25 MG/1
25 TABLET, FILM COATED ORAL ONCE
Status: COMPLETED | OUTPATIENT
Start: 2023-04-04 | End: 2023-04-04

## 2023-04-04 RX ORDER — PSEUDOEPHEDRINE HCL 30 MG
100 TABLET ORAL 2 TIMES DAILY
Qty: 60 CAPSULE | Refills: 1 | Status: SHIPPED | OUTPATIENT
Start: 2023-04-04

## 2023-04-04 RX ADMIN — HYDROCODONE BITARTRATE AND ACETAMINOPHEN 1 TABLET: 5; 325 TABLET ORAL at 13:00

## 2023-04-04 RX ADMIN — IBUPROFEN 600 MG: 600 TABLET, FILM COATED ORAL at 05:53

## 2023-04-04 RX ADMIN — FERROUS SULFATE TAB EC 324 MG (65 MG FE EQUIVALENT) 324 MG: 324 (65 FE) TABLET DELAYED RESPONSE at 08:49

## 2023-04-04 RX ADMIN — DOCUSATE SODIUM 100 MG: 100 CAPSULE, LIQUID FILLED ORAL at 08:49

## 2023-04-04 RX ADMIN — IBUPROFEN 600 MG: 600 TABLET, FILM COATED ORAL at 13:00

## 2023-04-04 RX ADMIN — Medication 1 TABLET: at 08:49

## 2023-04-04 RX ADMIN — HYDROXYZINE HYDROCHLORIDE 25 MG: 25 TABLET, FILM COATED ORAL at 12:22

## 2023-04-04 NOTE — PLAN OF CARE
Goal Outcome Evaluation:  Plan of Care Reviewed With: patient, spouse        Progress: improving  Outcome Evaluation: VSS, FF, lochia light, patient ambulates in room, showers, voids without difficulty, pain tolerable with prn medications, plans for discharge today

## 2023-04-04 NOTE — DISCHARGE SUMMARY
[facility]  Discharge Summary  Patient Name: Moriah Nicole  : 2000  MRN: [id number]  CSN: [id number]    Discharge Summary    Date of Admission: 4/3/2023   Date of Discharge: 2023    Principle Discharge Dx: Active Hospital Problems    Diagnosis  POA   • **Encounter for elective induction of labor [Z34.90]  Not Applicable   • Normal labor [O80, Z37.9]  Not Applicable   •  (normal spontaneous vaginal delivery) [O80]  Not Applicable      Procedures Performed:    Brief History: Patient is a 22 y.o. now  who presented to labor and delivery in active labor at 39+3.   Hospital Course: Patient presented in labor, progressed spontaneously.  She had an uncomplicated .  Her postpartum course was unremarkable.  On PPD #1 she expressed the desire for discharge.  She had passed gas and was urinating normally.  She was eating a regular diet without difficulty.  She was ambulating well.  Bottle and breast feeding. Mood stable. Undecided contraception. Thinking vasectomy. Discharge instructions were given.  All questions were answered   Condition:  Discharge Activity:  Discharge Diet: Stable  Activity Instructions     Bathing Restrictions      Type of Restriction: Bathing    Bathing Restrictions: Other    Explain Bathing Restrictions: No soaking in bathtub for 4 weeks. Showers are fine.    Driving Restrictions      Type of Restriction: Driving    Driving Restrictions: No Driving (Time Limited)    Length: Other    Indicate Length of Restriction: No driving for 1 week or if using narcotic pain medications. Riding is car is fine.    Lifting Restrictions      Type of Restriction: Lifting    Lifting Restrictions: Other    Explain Lifing Restrictions: No lifting more than infant and baby carrier together for 6 weeks.    Pelvic Rest      Nothing in the vagina for 6 weeks to include tampons, intercourse, or douching.    Sexual Activity Restrictions      Type of Restriction: Sex     Explain Sexual Activity Restrictions: No sexual intercourse for at least 6 weeks        Regular   Discharge Medications:    Your medication list      START taking these medications      Instructions Last Dose Given Next Dose Due   acetaminophen 325 MG tablet  Commonly known as: TYLENOL      Take 2 tablets by mouth Every 6 (Six) Hours As Needed for Mild Pain (Second Line: Mild pain unrelieved by ibuprofen. Stagger doses 3 hours after dose of ibuprofen.).       docusate sodium 100 MG capsule      Take 1 capsule by mouth 2 (Two) Times a Day.       ibuprofen 600 MG tablet  Commonly known as: ADVIL,MOTRIN      Take 1 tablet by mouth Every 6 (Six) Hours As Needed for Mild Pain (First Line: Mild pain.).          CONTINUE taking these medications      Instructions Last Dose Given Next Dose Due   Prenatal Vitamin 27-0.8 MG tablet      Take 1 tablet by mouth Daily.             Where to Get Your Medications      These medications were sent to [facility]  [address] [address]    Hours: Monday through Friday 7:00am to 5:00pm Phone: [phone] ·   acetaminophen 325 MG tablet  · docusate sodium 100 MG capsule  · ibuprofen 600 MG tablet        Discharge Disposition: Home   Follow-up: No future appointments.  2 week PP visit (telephone)  6 week PP visit     <30 minutes was spent with the patient on the day of discharge.      This document has been electronically signed by Gita Brock DO on April 4, 2023 08:09 CDT

## 2023-04-07 LAB
CANNABINOIDS UR QL CFM: NORMAL
CARBOXYTHC/CREAT UR: 103 NG/MG CREAT
LEVEL OF DETECTION:: NORMAL

## 2023-04-10 ENCOUNTER — TELEPHONE (OUTPATIENT)
Dept: LACTATION | Facility: HOSPITAL | Age: 23
End: 2023-04-10
Payer: COMMERCIAL

## 2023-04-12 ENCOUNTER — PATIENT OUTREACH (OUTPATIENT)
Dept: LABOR AND DELIVERY | Facility: HOSPITAL | Age: 23
End: 2023-04-12
Payer: COMMERCIAL

## 2023-04-12 NOTE — OUTREACH NOTE
[facility]  Postpartum Check-In    Questions/Answers    Flowsheet Row Responses   Visit Setting Telephone   Best Method for Contacting Cell   OB Discharge Note Reviewed  Reviewed   OB Discharge Navigator Reviewed  Reviewed   OB Discharge Medications Reviewed  Reviewed   [place] discharged home with mother? Yes   Current Pain Levels 0-10 1   At Rest Pain Levels 0-10 1   Pain level with activity 0-10 4   Acceptable Pain Level 0-10 6   Verbalized Emotional State Relief, Acceptance   Family/Support Network Significant Other, Family   Level of Involvement in Care Attentive, Supportive, Interactive   Do you feel comfortable in your relationship with your baby? Yes   Have members of your household adjusted to your baby? Yes   Is the baby's father supportive and/or involved with the baby? Yes   How does your partner feel about the baby? Happy   Do you feel safe at home, school and work? Yes   Are you in a relationship with someone who threatens you or hurts you? No   Do you have the resources to keep yourself and your baby healthy and safe? Yes   Lochia (per patient report) Brown-leonardo Red   Amount Spotting   Number of pads per day 3   Lochia Odor None   Is patient breastfeeding? Yes, not pumping   Not pumping - Left Breast Soft, Nontender   Not pumping - Right Breast Nontender, Soft   Not pumping - Left Nipple Intact, Nontender   Not pumping - Right nipple Intact, Nontender   Postpartum Depression Screening Education Education Provided   Peripheral Blood Glucose Education Provided   Doctor Appointments: Education Provided   Breastfeeding Education Education Provided   Family Planning Education Education Provided   Postpartum Care Education Education Provided   S & S to report Education Provided   Followup Appointments Made N/A   Well Child Visit Appointments Made N/A   Did you complete the visit? Yes   Were there any specific concerns? No   Umbilical Cord No reported signs or symptoms   Was the baby circumcised?  Yes   Circumcision care and signs/symptoms to report Reviewed   Feeding Readiness Cues: Eager, Energy for feeding   Infant Feeding Method Breast, Expressed Breast Milk   Is a lactation referral indicated? No   Expressed milk PO (mL) 30-60   Expressed milk- frequency of feedings 2-3 hours   Number of wet diapers x 24 hours 8   Last BM x 24 hours 6   Emesis (Unmeasured Occurence) denies   What safe sleep surface is available? Bassinet   Are there stuffed animals, toys, pillows, quilts, blankets, wedges, positioners, bumpers or other loose bedding in the infant's sleeping environment? No   Where does the baby usually sleep? Bassinet   Does the baby ever share a sleep surface with a sibling, adult or pet? No   Does the baby ever share a sleep surface in a bed, couch, recliner or other? No   What position do you place your baby to sleep for naps? Back   What position do you place your baby to sleep at night Back   Are you and/or other caregivers smoking inside or outside the baby's home? No   Is the infant dressed appropiately for the temperature of the home? Yes   Do you use a clean, dry pacifier that is not attached to a string or stuffed animal? Yes          Review of Systems- negative per mother's report.      Performed by a clinician: 3 (4/12/2023 10:50 AM)        Criselda Kingston RN  Maternity Nurse Navigator    4/12/2023, 10:51 CDT

## 2023-04-12 NOTE — OUTREACH NOTE
[facility]  Unable to Reach       Questions/Answers    Flowsheet Row Responses   Pending Outreach Postpartum Check-in   Call Attempt First   Outcome Left message   Next Call Attempt Date 04/18/23   Unable to reach comments: will attempt call again next week if no return call or message.              Criselda Kingston RN  Maternity Nurse Navigator    4/12/2023, 10:29 CDT

## 2023-04-19 ENCOUNTER — PATIENT OUTREACH (OUTPATIENT)
Dept: CALL CENTER | Facility: HOSPITAL | Age: 23
End: 2023-04-19
Payer: COMMERCIAL

## 2023-04-19 NOTE — OUTREACH NOTE
Motherhood Connection Survey    Flowsheet Row Responses   [facility]   Week 1 attempt successful? Yes   Call end time 1439   Baby sex Boy    discharged home with mother? Yes   Baby sex Boy   Delivery type Vaginal   Emotional state Acceptance   Family support Yes   Do you have all necessary resources to care for you and your baby?  Yes   Have members of your household adjusted to your baby? Yes   Did you have any problems with pre-eclampsia during this pregnancy? No   Did you have blood glucose issues during this pregnancy No   Lochia amount Light   Lochia per patient report Serosa   Did you have an episiotomy/tear/abdominal incision? Yes   Feeding Method Breast   Breast Condition No   Nipple Condition No   Signs baby is ready to eat Rooting, Crying   Feeding tolerance Wet/dirty diapers, Weight gain   Number of wet diapers x 24 hours 8-10   Last BM x 24 hours 4   Umbilical Cord No reported signs or symptoms   Was the baby circumcised? Yes   Circumcision care and signs/symptoms to report Reviewed   Where does the baby usually sleep? Bassinet   Are there stuffed animals, toys, pillows, quilts, blankets, wedges, positioners, bumpers or other loose bedding in the infant's sleeping environment? No   Does the baby ever share a sleep surface in a bed, couch, recliner or other? No   What position do you lay your baby down to sleep? Back   Are you and/or other caregivers smoking inside or outside the baby's home? No   Mom appointment comments: Mother sees obgyn at 6 weeks   Baby appointment comments: Baby has been seen by pediatrician   Call completed? Yes   How satisfied were you with the Motherhood Connection Program? 5            Xiomara GONZALES - Licensed Nurse

## 2023-04-19 NOTE — OUTREACH NOTE
Motherhood Connection Survey    Flowsheet Row Responses   [facility] patient discharged from? [facility]   Week 1 attempt successful? No   Unsuccessful attempts Attempt 1   Reschedule Today            Xiomara GONZALES - Licensed Nurse

## 2023-05-16 ENCOUNTER — OFFICE VISIT (OUTPATIENT)
Dept: OBSTETRICS AND GYNECOLOGY | Facility: CLINIC | Age: 23
End: 2023-05-16
Payer: COMMERCIAL

## 2023-05-16 VITALS
WEIGHT: 182 LBS | SYSTOLIC BLOOD PRESSURE: 104 MMHG | BODY MASS INDEX: 31.07 KG/M2 | DIASTOLIC BLOOD PRESSURE: 68 MMHG | HEIGHT: 64 IN

## 2023-05-16 DIAGNOSIS — Z30.013 ENCOUNTER FOR PRESCRIPTION FOR DEPO-PROVERA: ICD-10-CM

## 2023-05-16 LAB
B-HCG UR QL: NEGATIVE
EXPIRATION DATE: NORMAL
INTERNAL NEGATIVE CONTROL: NEGATIVE
INTERNAL POSITIVE CONTROL: POSITIVE
Lab: NORMAL

## 2023-05-16 RX ORDER — MEDROXYPROGESTERONE ACETATE 150 MG/ML
150 INJECTION, SUSPENSION INTRAMUSCULAR ONCE
Status: COMPLETED | OUTPATIENT
Start: 2023-05-16 | End: 2023-05-16

## 2023-05-16 RX ORDER — MEDROXYPROGESTERONE ACETATE 150 MG/ML
150 INJECTION, SUSPENSION INTRAMUSCULAR
Qty: 1 EACH | Refills: 3 | Status: SHIPPED | OUTPATIENT
Start: 2023-05-16

## 2023-05-16 RX ADMIN — MEDROXYPROGESTERONE ACETATE 150 MG: 150 INJECTION, SUSPENSION INTRAMUSCULAR at 14:03

## 2023-05-16 NOTE — PROGRESS NOTES
"Subjective   Chief Complaint   Patient presents with   • Postpartum Care     Moriah Nicole is a 23 y.o. year old  presenting to be seen for her postpartum visit.  She had a vaginal delivery.   Prenatal course was been benign.    Since delivery she has not been sexually active.  She does not have concerns about post-partum blues/depression. PPD 0  She is breast feeding and plans to continues for 1 year(s).  She has not resumed her menstrual cycle yet.   She denies any pain.   She reports normal voiding and bowel movements.   She would like to get the Depo Provera for BC.     The following portions of the patient's history were reviewed and updated as appropriate:current medications and allergies    Social History    Tobacco Use      Smoking status: Every Day        Packs/day: 0.50        Types: Cigarettes        Last attempt to quit: 2022        Years since quittin.5      Smokeless tobacco: Never    Review of Systems   Constitutional: Negative for appetite change, chills, fatigue and fever.   Respiratory: Negative for apnea, cough, choking, chest tightness, shortness of breath, wheezing and stridor.    Cardiovascular: Negative for chest pain, palpitations and leg swelling.   Gastrointestinal: Negative for abdominal pain, constipation, diarrhea, nausea and vomiting.   Genitourinary: Negative for amenorrhea, breast discharge, breast lump, breast pain, decreased libido, decreased urine volume, difficulty urinating, dyspareunia, dysuria, flank pain, frequency, genital sores, hematuria, menstrual problem, pelvic pain, pelvic pressure, urgency, urinary incontinence, vaginal bleeding, vaginal discharge and vaginal pain.        Objective   /68   Ht 161.3 cm (63.5\")   Wt 82.6 kg (182 lb)   LMP 2022 (Exact Date)   Breastfeeding Yes   BMI 31.73 kg/m²     General:  well developed; well nourished  no acute distress   Abdomen: soft, non-tender; no masses  no umbilical or inguinal hernias " are present  no hepato-splenomegaly   Pelvis: Not performed.            Diagnoses and all orders for this visit:    Postpartum follow-up    Encounter for prescription for depo-Provera    Other orders  -     medroxyPROGESTERone (Depo-Provera) 150 MG/ML injection; Inject 1 mL into the appropriate muscle as directed by prescriber Every 3 (Three) Months.    Discussed RBA and possible s/e of Depo Provera with patient. She would still like to proceed.   UPT negative in office.   All previous restrictions lifted.   RTC in 3 months for additional Depo injections.   RTC for pap smear.         This document has been electronically signed by DAVID David on May 16, 2023 13:58 CDT      DAVID David  May 16, 2023

## 2023-05-22 ENCOUNTER — OFFICE VISIT (OUTPATIENT)
Dept: SURGERY | Facility: CLINIC | Age: 23
End: 2023-05-22
Payer: COMMERCIAL

## 2023-05-22 ENCOUNTER — PRE-ADMISSION TESTING (OUTPATIENT)
Dept: PREADMISSION TESTING | Facility: HOSPITAL | Age: 23
End: 2023-05-22
Payer: COMMERCIAL

## 2023-05-22 ENCOUNTER — HOSPITAL ENCOUNTER (EMERGENCY)
Facility: HOSPITAL | Age: 23
Discharge: HOME | End: 2023-05-22
Attending: EMERGENCY MEDICINE
Payer: COMMERCIAL

## 2023-05-22 ENCOUNTER — APPOINTMENT (OUTPATIENT)
Dept: ULTRASOUND IMAGING | Facility: HOSPITAL | Age: 23
End: 2023-05-22
Payer: COMMERCIAL

## 2023-05-22 VITALS
DIASTOLIC BLOOD PRESSURE: 80 MMHG | SYSTOLIC BLOOD PRESSURE: 150 MMHG | BODY MASS INDEX: 32.25 KG/M2 | HEIGHT: 63 IN | WEIGHT: 182 LBS | OXYGEN SATURATION: 100 % | TEMPERATURE: 97.4 F | HEART RATE: 73 BPM

## 2023-05-22 VITALS
HEIGHT: 63 IN | SYSTOLIC BLOOD PRESSURE: 111 MMHG | HEART RATE: 64 BPM | DIASTOLIC BLOOD PRESSURE: 56 MMHG | WEIGHT: 182 LBS | RESPIRATION RATE: 20 BRPM | OXYGEN SATURATION: 99 % | BODY MASS INDEX: 32.25 KG/M2 | TEMPERATURE: 98.2 F

## 2023-05-22 DIAGNOSIS — E87.6 HYPOKALEMIA: ICD-10-CM

## 2023-05-22 DIAGNOSIS — K80.20 CALCULUS OF GALLBLADDER WITHOUT CHOLECYSTITIS WITHOUT OBSTRUCTION: Primary | ICD-10-CM

## 2023-05-22 LAB
ALBUMIN SERPL-MCNC: 4.7 G/DL (ref 3.5–5.2)
ALBUMIN/GLOB SERPL: 1.8 G/DL
ALP SERPL-CCNC: 105 U/L (ref 39–117)
ALT SERPL W P-5'-P-CCNC: 11 U/L (ref 1–33)
ANION GAP SERPL CALCULATED.3IONS-SCNC: 18 MMOL/L (ref 5–15)
AST SERPL-CCNC: 22 U/L (ref 1–32)
BASOPHILS # BLD AUTO: 0.03 10*3/MM3 (ref 0–0.2)
BASOPHILS NFR BLD AUTO: 0.3 % (ref 0–1.5)
BILIRUB SERPL-MCNC: 0.4 MG/DL (ref 0–1.2)
BILIRUB UR QL STRIP: NEGATIVE
BUN SERPL-MCNC: 18 MG/DL (ref 6–20)
BUN/CREAT SERPL: 21.4 (ref 7–25)
CALCIUM SPEC-SCNC: 9.6 MG/DL (ref 8.6–10.5)
CHLORIDE SERPL-SCNC: 103 MMOL/L (ref 98–107)
CLARITY UR: CLEAR
CO2 SERPL-SCNC: 19 MMOL/L (ref 22–29)
COLOR UR: YELLOW
CREAT SERPL-MCNC: 0.84 MG/DL (ref 0.57–1)
DEPRECATED RDW RBC AUTO: 38.5 FL (ref 37–54)
EGFRCR SERPLBLD CKD-EPI 2021: 100.9 ML/MIN/1.73
EOSINOPHIL # BLD AUTO: 0.15 10*3/MM3 (ref 0–0.4)
EOSINOPHIL NFR BLD AUTO: 1.4 % (ref 0.3–6.2)
ERYTHROCYTE [DISTWIDTH] IN BLOOD BY AUTOMATED COUNT: 13.2 % (ref 12.3–15.4)
GLOBULIN UR ELPH-MCNC: 2.6 GM/DL
GLUCOSE SERPL-MCNC: 114 MG/DL (ref 65–99)
GLUCOSE UR STRIP-MCNC: NEGATIVE MG/DL
HCG SERPL QL: NEGATIVE
HCT VFR BLD AUTO: 39.5 % (ref 34–46.6)
HGB BLD-MCNC: 13 G/DL (ref 12–15.9)
HGB UR QL STRIP.AUTO: NEGATIVE
HOLD SPECIMEN: NORMAL
HOLD SPECIMEN: NORMAL
IMM GRANULOCYTES # BLD AUTO: 0.03 10*3/MM3 (ref 0–0.05)
IMM GRANULOCYTES NFR BLD AUTO: 0.3 % (ref 0–0.5)
KETONES UR QL STRIP: NEGATIVE
LEUKOCYTE ESTERASE UR QL STRIP.AUTO: NEGATIVE
LIPASE SERPL-CCNC: 36 U/L (ref 13–60)
LYMPHOCYTES # BLD AUTO: 4.14 10*3/MM3 (ref 0.7–3.1)
LYMPHOCYTES NFR BLD AUTO: 40 % (ref 19.6–45.3)
MAGNESIUM SERPL-MCNC: 1.7 MG/DL (ref 1.6–2.6)
MCH RBC QN AUTO: 26.6 PG (ref 26.6–33)
MCHC RBC AUTO-ENTMCNC: 32.9 G/DL (ref 31.5–35.7)
MCV RBC AUTO: 80.8 FL (ref 79–97)
MONOCYTES # BLD AUTO: 0.73 10*3/MM3 (ref 0.1–0.9)
MONOCYTES NFR BLD AUTO: 7.1 % (ref 5–12)
NEUTROPHILS NFR BLD AUTO: 5.27 10*3/MM3 (ref 1.7–7)
NEUTROPHILS NFR BLD AUTO: 50.9 % (ref 42.7–76)
NITRITE UR QL STRIP: NEGATIVE
NRBC BLD AUTO-RTO: 0 /100 WBC (ref 0–0.2)
PH UR STRIP.AUTO: 7 [PH] (ref 5–9)
PLATELET # BLD AUTO: 322 10*3/MM3 (ref 140–450)
PMV BLD AUTO: 9.9 FL (ref 6–12)
POTASSIUM SERPL-SCNC: 3 MMOL/L (ref 3.5–5.2)
PROT SERPL-MCNC: 7.3 G/DL (ref 6–8.5)
PROT UR QL STRIP: NEGATIVE
RBC # BLD AUTO: 4.89 10*6/MM3 (ref 3.77–5.28)
SODIUM SERPL-SCNC: 140 MMOL/L (ref 136–145)
SP GR UR STRIP: 1.02 (ref 1–1.03)
UROBILINOGEN UR QL STRIP: NORMAL
WBC NRBC COR # BLD: 10.35 10*3/MM3 (ref 3.4–10.8)
WHOLE BLOOD HOLD COAG: NORMAL
WHOLE BLOOD HOLD SPECIMEN: NORMAL

## 2023-05-22 PROCEDURE — 96361 HYDRATE IV INFUSION ADD-ON: CPT

## 2023-05-22 PROCEDURE — 25010000002 ONDANSETRON PER 1 MG: Performed by: EMERGENCY MEDICINE

## 2023-05-22 PROCEDURE — 96374 THER/PROPH/DIAG INJ IV PUSH: CPT

## 2023-05-22 PROCEDURE — 99284 EMERGENCY DEPT VISIT MOD MDM: CPT

## 2023-05-22 PROCEDURE — 76705 ECHO EXAM OF ABDOMEN: CPT

## 2023-05-22 PROCEDURE — 25010000002 HYDROMORPHONE 1 MG/ML SOLUTION: Performed by: EMERGENCY MEDICINE

## 2023-05-22 PROCEDURE — 83735 ASSAY OF MAGNESIUM: CPT | Performed by: EMERGENCY MEDICINE

## 2023-05-22 PROCEDURE — 80053 COMPREHEN METABOLIC PANEL: CPT

## 2023-05-22 PROCEDURE — 83690 ASSAY OF LIPASE: CPT

## 2023-05-22 PROCEDURE — 81003 URINALYSIS AUTO W/O SCOPE: CPT | Performed by: EMERGENCY MEDICINE

## 2023-05-22 PROCEDURE — 96375 TX/PRO/DX INJ NEW DRUG ADDON: CPT

## 2023-05-22 PROCEDURE — 85025 COMPLETE CBC W/AUTO DIFF WBC: CPT

## 2023-05-22 PROCEDURE — 84703 CHORIONIC GONADOTROPIN ASSAY: CPT | Performed by: EMERGENCY MEDICINE

## 2023-05-22 PROCEDURE — 25010000002 KETOROLAC TROMETHAMINE PER 15 MG: Performed by: EMERGENCY MEDICINE

## 2023-05-22 RX ORDER — POTASSIUM CHLORIDE 1500 MG/1
20 TABLET, EXTENDED RELEASE ORAL DAILY
Qty: 3 TABLET | Refills: 0 | Status: SHIPPED | OUTPATIENT
Start: 2023-05-22 | End: 2023-05-25

## 2023-05-22 RX ORDER — ONDANSETRON 2 MG/ML
4 INJECTION INTRAMUSCULAR; INTRAVENOUS ONCE
Status: COMPLETED | OUTPATIENT
Start: 2023-05-22 | End: 2023-05-22

## 2023-05-22 RX ORDER — INDOCYANINE GREEN AND WATER 25 MG
5 KIT INJECTION ONCE
Status: CANCELLED | OUTPATIENT
Start: 2023-05-24 | End: 2023-05-22

## 2023-05-22 RX ORDER — BUPIVACAINE HCL/0.9 % NACL/PF 0.1 %
2 PLASTIC BAG, INJECTION (ML) EPIDURAL ONCE
Status: CANCELLED | OUTPATIENT
Start: 2023-05-24 | End: 2023-05-22

## 2023-05-22 RX ORDER — KETOROLAC TROMETHAMINE 30 MG/ML
30 INJECTION, SOLUTION INTRAMUSCULAR; INTRAVENOUS ONCE
Status: COMPLETED | OUTPATIENT
Start: 2023-05-22 | End: 2023-05-22

## 2023-05-22 RX ORDER — ONDANSETRON 4 MG/1
4 TABLET, ORALLY DISINTEGRATING ORAL EVERY 8 HOURS PRN
Qty: 10 TABLET | Refills: 0 | Status: SHIPPED | OUTPATIENT
Start: 2023-05-22

## 2023-05-22 RX ORDER — POTASSIUM CHLORIDE 750 MG/1
40 CAPSULE, EXTENDED RELEASE ORAL ONCE
Status: COMPLETED | OUTPATIENT
Start: 2023-05-22 | End: 2023-05-22

## 2023-05-22 RX ORDER — SODIUM CHLORIDE, SODIUM GLUCONATE, SODIUM ACETATE, POTASSIUM CHLORIDE AND MAGNESIUM CHLORIDE 526; 502; 368; 37; 30 MG/100ML; MG/100ML; MG/100ML; MG/100ML; MG/100ML
1000 INJECTION, SOLUTION INTRAVENOUS CONTINUOUS PRN
Status: CANCELLED | OUTPATIENT
Start: 2023-05-24

## 2023-05-22 RX ORDER — POTASSIUM CHLORIDE 20 MEQ/1
20 TABLET, EXTENDED RELEASE ORAL DAILY
COMMUNITY
Start: 2023-05-22 | End: 2023-05-22

## 2023-05-22 RX ORDER — SODIUM CHLORIDE 9 MG/ML
125 INJECTION, SOLUTION INTRAVENOUS CONTINUOUS
Status: DISCONTINUED | OUTPATIENT
Start: 2023-05-22 | End: 2023-05-22 | Stop reason: HOSPADM

## 2023-05-22 RX ORDER — PANTOPRAZOLE SODIUM 40 MG/10ML
40 INJECTION, POWDER, LYOPHILIZED, FOR SOLUTION INTRAVENOUS ONCE
Status: COMPLETED | OUTPATIENT
Start: 2023-05-22 | End: 2023-05-22

## 2023-05-22 RX ORDER — MULTIVIT WITH MINERALS/LUTEIN
250 TABLET ORAL DAILY
COMMUNITY

## 2023-05-22 RX ORDER — KETOROLAC TROMETHAMINE 30 MG/ML
30 INJECTION, SOLUTION INTRAMUSCULAR; INTRAVENOUS ONCE
Status: DISCONTINUED | OUTPATIENT
Start: 2023-05-22 | End: 2023-05-22

## 2023-05-22 RX ORDER — SODIUM CHLORIDE 0.9 % (FLUSH) 0.9 %
10 SYRINGE (ML) INJECTION AS NEEDED
Status: DISCONTINUED | OUTPATIENT
Start: 2023-05-22 | End: 2023-05-22 | Stop reason: HOSPADM

## 2023-05-22 RX ADMIN — SODIUM CHLORIDE 125 ML/HR: 9 INJECTION, SOLUTION INTRAVENOUS at 07:44

## 2023-05-22 RX ADMIN — HYDROMORPHONE HYDROCHLORIDE 1 MG: 1 INJECTION, SOLUTION INTRAMUSCULAR; INTRAVENOUS; SUBCUTANEOUS at 07:44

## 2023-05-22 RX ADMIN — SODIUM CHLORIDE 1000 ML: 9 INJECTION, SOLUTION INTRAVENOUS at 06:43

## 2023-05-22 RX ADMIN — POTASSIUM CHLORIDE 40 MEQ: 10 CAPSULE, COATED, EXTENDED RELEASE ORAL at 07:42

## 2023-05-22 RX ADMIN — ONDANSETRON 4 MG: 2 INJECTION INTRAMUSCULAR; INTRAVENOUS at 07:06

## 2023-05-22 RX ADMIN — PANTOPRAZOLE SODIUM 40 MG: 40 INJECTION, POWDER, FOR SOLUTION INTRAVENOUS at 06:44

## 2023-05-22 RX ADMIN — KETOROLAC TROMETHAMINE 30 MG: 30 INJECTION, SOLUTION INTRAMUSCULAR; INTRAVENOUS at 07:06

## 2023-05-22 NOTE — ED NOTES
"Pt reports she \"was at home and woke up  with abdomen pain\"; pt reports pain in upper center abdomen that is \"twisting\"; pt denies n/v/d or vaginal bleeding at this time; pt reports she is one month post delivery of child.   "

## 2023-05-22 NOTE — ED PROVIDER NOTES
"Subjective   History of Present Illness  22yo female presents ED c/o awakening this morning acute onset epigastric abdominal pain characterized as \"sharp/twisting\"/radiating thru to back/associated nausea/neg exac or relieve factors.  Pt reports similar episode pain 1wk previously lasting approximately 2hrs with spontaneous resolution.  ROS neg fever/chills/cough/chest pain/diarrhea/dysuria/vaginal discharge/hx pud.    History provided by:  Patient  Abdominal Pain  Pain location:  Epigastric and RUQ  Pain quality: sharp    Pain radiates to:  Back  Pain severity:  Severe  Onset quality:  Sudden  Associated symptoms: nausea    Associated symptoms: no diarrhea and no vomiting        Review of Systems   Constitutional: Negative.    HENT: Negative.    Eyes: Negative.    Cardiovascular: Negative.    Gastrointestinal: Positive for abdominal pain and nausea. Negative for blood in stool, diarrhea and vomiting.   Genitourinary: Negative.    Musculoskeletal: Negative.    Skin: Negative.    Allergic/Immunologic: Negative.    All other systems reviewed and are negative.      Past Medical History:   Diagnosis Date   • ADHD    • Allergic    • Anxiety    • Lumbar radiculopathy 11/15/2021       No Known Allergies    Past Surgical History:   Procedure Laterality Date   • LUMBAR DISCECTOMY Right 11/19/2021    Procedure: LUMBAR DISCECTOMY L4-5 RIGHT;  Surgeon: Bony Orellana MD;  Location: [facility] OR;  Service: Neurosurgery;  Laterality: Right;       Family History   Problem Relation Age of Onset   • Diabetes Mother    • Stroke Mother    • Fibromyalgia Mother    • Hypertension Father    • Heart attack Father    • Heart disease Father    • Schizophrenia Brother    • Bipolar disorder Brother    • No Known Problems Brother    • No Known Problems Son    • Hepatitis Maternal Grandmother    • Schizophrenia Maternal Grandmother    • Heart disease Paternal Grandmother    • Diabetes Paternal Grandmother    • Heart failure Paternal Grandmother "    • Heart disease Paternal Grandfather    • Heart attack Paternal Grandfather        Social History     Socioeconomic History   • Marital status:    • Highest education level: High school graduate   Tobacco Use   • Smoking status: Every Day     Packs/day: 0.50     Types: Cigarettes     Last attempt to quit: 2022     Years since quittin.5   • Smokeless tobacco: Never   Vaping Use   • Vaping Use: Some days   • Substances: Nicotine, Flavoring   • Devices: Disposable, Pre-filled pod   Substance and Sexual Activity   • Alcohol use: Not Currently   • Drug use: Not Currently     Types: Marijuana   • Sexual activity: Yes     Partners: Male     Comment: unsure about last pap smear           Objective   Physical Exam  Vitals and nursing note reviewed.   Constitutional:       General: She is in acute distress.   HENT:      Head: Normocephalic and atraumatic.      Right Ear: External ear normal.      Left Ear: External ear normal.      Nose: Nose normal.      Mouth/Throat:      Mouth: Mucous membranes are moist.      Pharynx: Oropharynx is clear. No oropharyngeal exudate or posterior oropharyngeal erythema.   Eyes:      Pupils: Pupils are equal, round, and reactive to light.   Cardiovascular:      Rate and Rhythm: Normal rate and regular rhythm.      Pulses: Normal pulses.      Heart sounds: Normal heart sounds. No murmur heard.    No friction rub. No gallop.   Pulmonary:      Effort: Pulmonary effort is normal. No respiratory distress.      Breath sounds: Normal breath sounds. No wheezing, rhonchi or rales.   Abdominal:      General: Abdomen is flat.      Palpations: Abdomen is soft.      Tenderness: There is abdominal tenderness in the right upper quadrant and epigastric area. There is no right CVA tenderness, left CVA tenderness, guarding or rebound. Negative signs include Infante's sign and McBurney's sign.      Hernia: There is no hernia in the umbilical area or ventral area.       Musculoskeletal:          General: No swelling or deformity. Normal range of motion.      Cervical back: Normal range of motion and neck supple. No rigidity.      Right lower leg: No edema.      Left lower leg: No edema.   Lymphadenopathy:      Cervical: No cervical adenopathy.   Skin:     General: Skin is warm and dry.   Neurological:      General: No focal deficit present.      Mental Status: She is alert and oriented to person, place, and time.         Procedures           ED Course      Labs Reviewed   COMPREHENSIVE METABOLIC PANEL - Abnormal; Notable for the following components:       Result Value    Glucose 114 (*)     Potassium 3.0 (*)     CO2 19.0 (*)     Anion Gap 18.0 (*)     All other components within normal limits    Narrative:     GFR Normal >60  Chronic Kidney Disease <60  Kidney Failure <15     CBC WITH AUTO DIFFERENTIAL - Abnormal; Notable for the following components:    Lymphocytes, Absolute 4.14 (*)     All other components within normal limits   LIPASE - Normal   URINALYSIS W/ MICROSCOPIC IF INDICATED (NO CULTURE) - Normal    Narrative:     Urine microscopic not indicated.   HCG, SERUM, QUALITATIVE - Normal   MAGNESIUM - Normal   RAINBOW DRAW    Narrative:     The following orders were created for panel order [facility] Draw.  Procedure                               Abnormality         Status                     ---------                               -----------         ------                     Green Top (Gel)[[id number]]                                  Final result               Lavender Top[[id number]]                                     Final result               Gold Top - SST[[id number]]                                   Final result               Light Blue Top[[id number]]                                   Final result                 Please view results for these tests on the individual orders.   CBC AND DIFFERENTIAL    Narrative:     The following orders were created for panel order CBC & Differential.  Procedure                                Abnormality         Status                     ---------                               -----------         ------                     CBC Auto Differential[[id number]]        Abnormal            Final result                 Please view results for these tests on the individual orders.   GREEN TOP   LAVENDER TOP   GOLD TOP - SST   LIGHT BLUE TOP     US Gallbladder    Result Date: 5/22/2023  Narrative: INDICATION: Epigastric pain. TECHNIQUE: Multi-planar high-resolution gray scale images of the gallbladder were obtained. FINDINGS: Liver is normal. Gallbladder demonstrates cholelithiasis. Patient was somewhat tender over the gallbladder while scanning. No wall thickening or pericholecystic fluid. No other significant findings. SUMMARY: Cholelithiasis. Mild tenderness over the gallbladder while scanning.                                         Medical Decision Making  Labs/ultrasound findings reviewed.  CBC/CMP: remarkable for K 3.0meq/L.  Lipase: normal.  LFTs: normal.  Gallbladder US: significant cholelithiasis without evidence gallbladder wall thickening/pericholecystic fluid.  Normal CBD.  No evidence SIRS.  Absence of leukocytosis/fever.  No clinical Infante sign.  Labs/exam/US consistent with biliary colic.  Pt resting comfortably at time of discharge.  Stable dc with supportive care et outpatient surgical/pmd followup.  Plan kcl 20meq daily x3d/zofran prn/clear liquid diet x24hrs.    Calculus of gallbladder without cholecystitis without obstruction: acute illness or injury  Hypokalemia: acute illness or injury  Amount and/or Complexity of Data Reviewed  Labs: ordered.  Radiology: ordered.      Risk  Prescription drug management.          Final diagnoses:   Calculus of gallbladder without cholecystitis without obstruction   Hypokalemia       ED Disposition  ED Disposition     ED Disposition   Discharge    Condition   Good    Comment   --             Stanislaw Jaramillo MD  [address] [place] [address]  Dr [address]  [facility] [address] [phone]    In 2 days      Ramon Vargas, APRN  [address]  [place] [id number] [phone]    Schedule an appointment as soon as possible for a visit            Medication List      New Prescriptions    ondansetron ODT 4 MG disintegrating tablet  Commonly known as: ZOFRAN-ODT  Place 1 tablet on the tongue Every 8 (Eight) Hours As Needed for Nausea or Vomiting.     potassium chloride ER 20 MEQ tablet controlled-release ER tablet  Commonly known as: K-TAB  Take 1 tablet by mouth Daily for 3 days.           Where to Get Your Medications      These medications were sent to [place], [place] - [address] [facility] [phone]  - [phone]   [address] [facility] [address]    Phone: [phone]   · ondansetron ODT 4 MG disintegrating tablet  · potassium chloride ER 20 MEQ tablet controlled-release ER tablet          Judd Elise MD  05/22/23 [id number]

## 2023-05-22 NOTE — H&P (VIEW-ONLY)
Subjective   Moriah Nicole is a 23 y.o. female     Chief Complaint: abdominal pain    History of Present Illness  24 yo previously healthy woman who presented to the ED this weekend with acute onset of right upper quadrant abdominal pain assocaited with nausea and vomiting. She underwent ultrasound which demonstrated gallstones, but no acute cholecystitis. She was discharged from the ED with follow up here.        Review of Systems   Constitutional: Positive for fatigue.   HENT:        Ear popping   Eyes: Positive for visual disturbance.   Respiratory: Negative.    Cardiovascular: Negative.    Gastrointestinal: Positive for abdominal pain and nausea.   Endocrine: Positive for heat intolerance.   Genitourinary: Negative.    Musculoskeletal: Negative.    Skin: Negative.    Allergic/Immunologic: Negative.    Neurological: Positive for headaches.   Hematological: Bruises/bleeds easily.   Psychiatric/Behavioral: Negative.      Past Medical History:   Diagnosis Date   • ADHD    • Allergic    • Anxiety    • Lumbar radiculopathy 11/15/2021     Past Surgical History:   Procedure Laterality Date   • LUMBAR DISCECTOMY Right 11/19/2021    Procedure: LUMBAR DISCECTOMY L4-5 RIGHT;  Surgeon: Bony Orellana MD;  Location: [facility];  Service: Neurosurgery;  Laterality: Right;     Family History   Problem Relation Age of Onset   • Diabetes Mother    • Stroke Mother    • Fibromyalgia Mother    • Hypertension Father    • Heart attack Father    • Heart disease Father    • Schizophrenia Brother    • Bipolar disorder Brother    • No Known Problems Brother    • No Known Problems Son    • Hepatitis Maternal Grandmother    • Schizophrenia Maternal Grandmother    • Heart disease Paternal Grandmother    • Diabetes Paternal Grandmother    • Heart failure Paternal Grandmother    • Heart disease Paternal Grandfather     • Heart attack Paternal Grandfather      Social History     Socioeconomic History   • Marital status:    • Highest education level: High school graduate   Tobacco Use   • Smoking status: Former     Packs/day: 0.50     Types: Cigarettes     Quit date: 2023     Years since quittin.2   • Smokeless tobacco: Never   Vaping Use   • Vaping Use: Some days   • Substances: Nicotine, Flavoring   • Devices: Disposable, Pre-filled pod   Substance and Sexual Activity   • Alcohol use: Not Currently   • Drug use: Not Currently     Types: Marijuana   • Sexual activity: Defer     Partners: Male     Comment: unsure about last pap smear     No Known Allergies  Vitals:    23 1051   BP: 150/80   Pulse: 73   Temp: 97.4 °F (36.3 °C)   SpO2: 100%       Home Medications:  Prior to Admission medications    Medication Sig Start Date End Date Taking? Authorizing Provider   medroxyPROGESTERone (Depo-Provera) 150 MG/ML injection Inject 1 mL into the appropriate muscle as directed by prescriber Every 3 (Three) Months. 23  Yes Katharine Torres APRN   ondansetron ODT (ZOFRAN-ODT) 4 MG disintegrating tablet Place 1 tablet on the tongue Every 8 (Eight) Hours As Needed for Nausea or Vomiting. 23  Yes Judd Elise MD   potassium chloride (K-DUR,KLOR-CON) 20 MEQ CR tablet  23  Yes Colette Ruth MD   potassium chloride (K-TAB) 20 MEQ tablet controlled-release ER tablet Take 1 tablet by mouth Daily for 3 days. 23 Yes Judd Elise MD   Prenatal Vit-Fe Fumarate-FA (Prenatal Vitamin) 27-0.8 MG tablet Take 1 tablet by mouth Daily.   Yes Provider, MD Colette   vitamin C (ASCORBIC ACID) 250 MG tablet Take 1 tablet by mouth Daily.   Yes Provider, MD Colette       Objective   Physical Exam  Constitutional:       Appearance: Normal appearance.   HENT:      Head: Normocephalic and atraumatic.      Nose: Nose normal. No congestion.      Mouth/Throat:      Mouth: Mucous membranes are moist.       Pharynx: Oropharynx is clear.   Eyes:      General: No scleral icterus.     Pupils: Pupils are equal, round, and reactive to light.   Cardiovascular:      Rate and Rhythm: Normal rate and regular rhythm.   Pulmonary:      Effort: Pulmonary effort is normal. No respiratory distress.   Abdominal:      Tenderness: There is abdominal tenderness.   Skin:     General: Skin is warm and dry.   Neurological:      General: No focal deficit present.      Mental Status: She is alert and oriented to person, place, and time.   Psychiatric:         Mood and Affect: Mood normal.         Behavior: Behavior normal.         Assessment & Plan       The encounter diagnosis was Calculus of gallbladder without cholecystitis without obstruction.     - I offered the patient a robotic cholecystectomy and discussed the risks, benefits and alteratives to surgery. The risks of the surgery are bleeding, infection, damage to surrounding structures (common bile duct or intestine), pain and scarring. The benefits are likely resolution of the pain as well as prevention of complications such as pancreatitis, cholangitis, acute cholecystitis and other biliary pathology. Alternatively we could not operate on the patient, however this puts her at risk of the above complications. The patient understands these risks and wishes to proceed with robotic assisted cholecystectomy.                   This document has been electronically signed by Stanislaw Jaramillo MD on May 23, 2023 07:48 CDT

## 2023-05-22 NOTE — PROGRESS NOTES
Subjective   Moriah Nicole is a 23 y.o. female     Chief Complaint: abdominal pain    History of Present Illness  22 yo previously healthy woman who presented to the ED this weekend with acute onset of right upper quadrant abdominal pain assocaited with nausea and vomiting. She underwent ultrasound which demonstrated gallstones, but no acute cholecystitis. She was discharged from the ED with follow up here.        Review of Systems   Constitutional: Positive for fatigue.   HENT:        Ear popping   Eyes: Positive for visual disturbance.   Respiratory: Negative.    Cardiovascular: Negative.    Gastrointestinal: Positive for abdominal pain and nausea.   Endocrine: Positive for heat intolerance.   Genitourinary: Negative.    Musculoskeletal: Negative.    Skin: Negative.    Allergic/Immunologic: Negative.    Neurological: Positive for headaches.   Hematological: Bruises/bleeds easily.   Psychiatric/Behavioral: Negative.      Past Medical History:   Diagnosis Date   • ADHD    • Allergic    • Anxiety    • Lumbar radiculopathy 11/15/2021     Past Surgical History:   Procedure Laterality Date   • LUMBAR DISCECTOMY Right 11/19/2021    Procedure: LUMBAR DISCECTOMY L4-5 RIGHT;  Surgeon: Bony Orellana MD;  Location: [facility];  Service: Neurosurgery;  Laterality: Right;     Family History   Problem Relation Age of Onset   • Diabetes Mother    • Stroke Mother    • Fibromyalgia Mother    • Hypertension Father    • Heart attack Father    • Heart disease Father    • Schizophrenia Brother    • Bipolar disorder Brother    • No Known Problems Brother    • No Known Problems Son    • Hepatitis Maternal Grandmother    • Schizophrenia Maternal Grandmother    • Heart disease Paternal Grandmother    • Diabetes Paternal Grandmother    • Heart failure Paternal Grandmother    • Heart disease Paternal Grandfather     • Heart attack Paternal Grandfather      Social History     Socioeconomic History   • Marital status:    • Highest education level: High school graduate   Tobacco Use   • Smoking status: Former     Packs/day: 0.50     Types: Cigarettes     Quit date: 2023     Years since quittin.2   • Smokeless tobacco: Never   Vaping Use   • Vaping Use: Some days   • Substances: Nicotine, Flavoring   • Devices: Disposable, Pre-filled pod   Substance and Sexual Activity   • Alcohol use: Not Currently   • Drug use: Not Currently     Types: Marijuana   • Sexual activity: Defer     Partners: Male     Comment: unsure about last pap smear     No Known Allergies  Vitals:    23 1051   BP: 150/80   Pulse: 73   Temp: 97.4 °F (36.3 °C)   SpO2: 100%       Home Medications:  Prior to Admission medications    Medication Sig Start Date End Date Taking? Authorizing Provider   medroxyPROGESTERone (Depo-Provera) 150 MG/ML injection Inject 1 mL into the appropriate muscle as directed by prescriber Every 3 (Three) Months. 23  Yes Katharine Torres APRN   ondansetron ODT (ZOFRAN-ODT) 4 MG disintegrating tablet Place 1 tablet on the tongue Every 8 (Eight) Hours As Needed for Nausea or Vomiting. 23  Yes Judd Elise MD   potassium chloride (K-DUR,KLOR-CON) 20 MEQ CR tablet  23  Yes Colette Ruth MD   potassium chloride (K-TAB) 20 MEQ tablet controlled-release ER tablet Take 1 tablet by mouth Daily for 3 days. 23 Yes Judd Elise MD   Prenatal Vit-Fe Fumarate-FA (Prenatal Vitamin) 27-0.8 MG tablet Take 1 tablet by mouth Daily.   Yes Provider, MD Colette   vitamin C (ASCORBIC ACID) 250 MG tablet Take 1 tablet by mouth Daily.   Yes Provider, MD Colette       Objective   Physical Exam  Constitutional:       Appearance: Normal appearance.   HENT:      Head: Normocephalic and atraumatic.      Nose: Nose normal. No congestion.      Mouth/Throat:      Mouth: Mucous membranes are moist.       Pharynx: Oropharynx is clear.   Eyes:      General: No scleral icterus.     Pupils: Pupils are equal, round, and reactive to light.   Cardiovascular:      Rate and Rhythm: Normal rate and regular rhythm.   Pulmonary:      Effort: Pulmonary effort is normal. No respiratory distress.   Abdominal:      Tenderness: There is abdominal tenderness.   Skin:     General: Skin is warm and dry.   Neurological:      General: No focal deficit present.      Mental Status: She is alert and oriented to person, place, and time.   Psychiatric:         Mood and Affect: Mood normal.         Behavior: Behavior normal.         Assessment & Plan       The encounter diagnosis was Calculus of gallbladder without cholecystitis without obstruction.     - I offered the patient a robotic cholecystectomy and discussed the risks, benefits and alteratives to surgery. The risks of the surgery are bleeding, infection, damage to surrounding structures (common bile duct or intestine), pain and scarring. The benefits are likely resolution of the pain as well as prevention of complications such as pancreatitis, cholangitis, acute cholecystitis and other biliary pathology. Alternatively we could not operate on the patient, however this puts her at risk of the above complications. The patient understands these risks and wishes to proceed with robotic assisted cholecystectomy.                   This document has been electronically signed by Stanislaw Jaramillo MD on May 23, 2023 07:48 CDT

## 2023-05-24 ENCOUNTER — HOSPITAL ENCOUNTER (OUTPATIENT)
Facility: HOSPITAL | Age: 23
Setting detail: HOSPITAL OUTPATIENT SURGERY
Discharge: HOME | End: 2023-05-24
Attending: STUDENT IN AN ORGANIZED HEALTH CARE EDUCATION/TRAINING PROGRAM | Admitting: STUDENT IN AN ORGANIZED HEALTH CARE EDUCATION/TRAINING PROGRAM
Payer: COMMERCIAL

## 2023-05-24 ENCOUNTER — ANESTHESIA EVENT (OUTPATIENT)
Dept: PERIOP | Facility: HOSPITAL | Age: 23
End: 2023-05-24
Payer: COMMERCIAL

## 2023-05-24 ENCOUNTER — ANESTHESIA (OUTPATIENT)
Dept: PERIOP | Facility: HOSPITAL | Age: 23
End: 2023-05-24
Payer: COMMERCIAL

## 2023-05-24 VITALS
WEIGHT: 179.9 LBS | BODY MASS INDEX: 30.71 KG/M2 | TEMPERATURE: 97.3 F | HEART RATE: 66 BPM | OXYGEN SATURATION: 97 % | RESPIRATION RATE: 18 BRPM | DIASTOLIC BLOOD PRESSURE: 68 MMHG | SYSTOLIC BLOOD PRESSURE: 135 MMHG | HEIGHT: 64 IN

## 2023-05-24 DIAGNOSIS — K80.20 CALCULUS OF GALLBLADDER WITHOUT CHOLECYSTITIS WITHOUT OBSTRUCTION: Primary | ICD-10-CM

## 2023-05-24 LAB
AMPHET+METHAMPHET UR QL: NEGATIVE
AMPHETAMINES UR QL: NEGATIVE
BARBITURATES UR QL SCN: NEGATIVE
BENZODIAZ UR QL SCN: NEGATIVE
BUPRENORPHINE SERPL-MCNC: NEGATIVE NG/ML
CANNABINOIDS SERPL QL: POSITIVE
COCAINE UR QL: NEGATIVE
FENTANYL UR-MCNC: NEGATIVE NG/ML
METHADONE UR QL SCN: NEGATIVE
OPIATES UR QL: NEGATIVE
OXYCODONE UR QL SCN: NEGATIVE
PCP UR QL SCN: NEGATIVE
PROPOXYPH UR QL: NEGATIVE
TRICYCLICS UR QL SCN: NEGATIVE

## 2023-05-24 PROCEDURE — 25010000002 DEXAMETHASONE PER 1 MG: Performed by: NURSE ANESTHETIST, CERTIFIED REGISTERED

## 2023-05-24 PROCEDURE — 25010000002 ONDANSETRON PER 1 MG: Performed by: NURSE ANESTHETIST, CERTIFIED REGISTERED

## 2023-05-24 PROCEDURE — 80307 DRUG TEST PRSMV CHEM ANLYZR: CPT | Performed by: ANESTHESIOLOGY

## 2023-05-24 PROCEDURE — 25010000002 SUGAMMADEX 200 MG/2ML SOLUTION: Performed by: NURSE ANESTHETIST, CERTIFIED REGISTERED

## 2023-05-24 PROCEDURE — 25010000002 CEFAZOLIN PER 500 MG: Performed by: STUDENT IN AN ORGANIZED HEALTH CARE EDUCATION/TRAINING PROGRAM

## 2023-05-24 PROCEDURE — 25010000002 INDOCYANINE GREEN 25 MG RECONSTITUTED SOLUTION: Performed by: STUDENT IN AN ORGANIZED HEALTH CARE EDUCATION/TRAINING PROGRAM

## 2023-05-24 PROCEDURE — 25010000002 MIDAZOLAM PER 1 MG: Performed by: NURSE ANESTHETIST, CERTIFIED REGISTERED

## 2023-05-24 PROCEDURE — 47562 LAPAROSCOPIC CHOLECYSTECTOMY: CPT | Performed by: STUDENT IN AN ORGANIZED HEALTH CARE EDUCATION/TRAINING PROGRAM

## 2023-05-24 PROCEDURE — 25010000002 FENTANYL CITRATE (PF) 100 MCG/2ML SOLUTION: Performed by: NURSE ANESTHETIST, CERTIFIED REGISTERED

## 2023-05-24 PROCEDURE — 25010000002 PHENYLEPHRINE 10 MG/ML SOLUTION: Performed by: NURSE ANESTHETIST, CERTIFIED REGISTERED

## 2023-05-24 PROCEDURE — 25010000002 PROPOFOL 200 MG/20ML EMULSION: Performed by: NURSE ANESTHETIST, CERTIFIED REGISTERED

## 2023-05-24 PROCEDURE — 25010000002 HYDROMORPHONE 1 MG/ML SOLUTION: Performed by: NURSE ANESTHETIST, CERTIFIED REGISTERED

## 2023-05-24 RX ORDER — BUPIVACAINE HCL/0.9 % NACL/PF 0.1 %
2 PLASTIC BAG, INJECTION (ML) EPIDURAL ONCE
Status: COMPLETED | OUTPATIENT
Start: 2023-05-24 | End: 2023-05-24

## 2023-05-24 RX ORDER — NALOXONE HCL 0.4 MG/ML
0.4 VIAL (ML) INJECTION AS NEEDED
Status: DISCONTINUED | OUTPATIENT
Start: 2023-05-24 | End: 2023-05-24 | Stop reason: HOSPADM

## 2023-05-24 RX ORDER — ONDANSETRON 2 MG/ML
INJECTION INTRAMUSCULAR; INTRAVENOUS AS NEEDED
Status: DISCONTINUED | OUTPATIENT
Start: 2023-05-24 | End: 2023-05-24 | Stop reason: SURG

## 2023-05-24 RX ORDER — DIPHENHYDRAMINE HYDROCHLORIDE 50 MG/ML
12.5 INJECTION INTRAMUSCULAR; INTRAVENOUS
Status: DISCONTINUED | OUTPATIENT
Start: 2023-05-24 | End: 2023-05-24 | Stop reason: HOSPADM

## 2023-05-24 RX ORDER — SODIUM CHLORIDE, SODIUM GLUCONATE, SODIUM ACETATE, POTASSIUM CHLORIDE AND MAGNESIUM CHLORIDE 526; 502; 368; 37; 30 MG/100ML; MG/100ML; MG/100ML; MG/100ML; MG/100ML
1000 INJECTION, SOLUTION INTRAVENOUS CONTINUOUS PRN
Status: DISCONTINUED | OUTPATIENT
Start: 2023-05-24 | End: 2023-05-24 | Stop reason: HOSPADM

## 2023-05-24 RX ORDER — HYDROCODONE BITARTRATE AND ACETAMINOPHEN 5; 325 MG/1; MG/1
1 TABLET ORAL EVERY 6 HOURS PRN
Qty: 24 TABLET | Refills: 0 | Status: SHIPPED | OUTPATIENT
Start: 2023-05-24

## 2023-05-24 RX ORDER — PROMETHAZINE HYDROCHLORIDE 25 MG/1
25 TABLET ORAL ONCE AS NEEDED
Status: DISCONTINUED | OUTPATIENT
Start: 2023-05-24 | End: 2023-05-24 | Stop reason: HOSPADM

## 2023-05-24 RX ORDER — EPHEDRINE SULFATE 50 MG/ML
5 INJECTION, SOLUTION INTRAVENOUS ONCE AS NEEDED
Status: DISCONTINUED | OUTPATIENT
Start: 2023-05-24 | End: 2023-05-24 | Stop reason: HOSPADM

## 2023-05-24 RX ORDER — PHENYLEPHRINE HCL IN 0.9% NACL 0.5 MG/5ML
SYRINGE (ML) INTRAVENOUS AS NEEDED
Status: DISCONTINUED | OUTPATIENT
Start: 2023-05-24 | End: 2023-05-24 | Stop reason: SURG

## 2023-05-24 RX ORDER — PROMETHAZINE HYDROCHLORIDE 25 MG/1
25 SUPPOSITORY RECTAL ONCE AS NEEDED
Status: DISCONTINUED | OUTPATIENT
Start: 2023-05-24 | End: 2023-05-24 | Stop reason: HOSPADM

## 2023-05-24 RX ORDER — PROPOFOL 10 MG/ML
INJECTION, EMULSION INTRAVENOUS AS NEEDED
Status: DISCONTINUED | OUTPATIENT
Start: 2023-05-24 | End: 2023-05-24 | Stop reason: SURG

## 2023-05-24 RX ORDER — FLUMAZENIL 0.1 MG/ML
0.2 INJECTION INTRAVENOUS AS NEEDED
Status: DISCONTINUED | OUTPATIENT
Start: 2023-05-24 | End: 2023-05-24 | Stop reason: HOSPADM

## 2023-05-24 RX ORDER — FENTANYL CITRATE 50 UG/ML
INJECTION, SOLUTION INTRAMUSCULAR; INTRAVENOUS AS NEEDED
Status: DISCONTINUED | OUTPATIENT
Start: 2023-05-24 | End: 2023-05-24 | Stop reason: SURG

## 2023-05-24 RX ORDER — MIDAZOLAM HYDROCHLORIDE 1 MG/ML
INJECTION INTRAMUSCULAR; INTRAVENOUS AS NEEDED
Status: DISCONTINUED | OUTPATIENT
Start: 2023-05-24 | End: 2023-05-24 | Stop reason: SURG

## 2023-05-24 RX ORDER — ROCURONIUM BROMIDE 10 MG/ML
INJECTION, SOLUTION INTRAVENOUS AS NEEDED
Status: DISCONTINUED | OUTPATIENT
Start: 2023-05-24 | End: 2023-05-24 | Stop reason: SURG

## 2023-05-24 RX ORDER — KETAMINE HYDROCHLORIDE 50 MG/ML
INJECTION, SOLUTION, CONCENTRATE INTRAMUSCULAR; INTRAVENOUS AS NEEDED
Status: DISCONTINUED | OUTPATIENT
Start: 2023-05-24 | End: 2023-05-24 | Stop reason: SURG

## 2023-05-24 RX ORDER — LIDOCAINE HYDROCHLORIDE AND EPINEPHRINE 10; 10 MG/ML; UG/ML
INJECTION, SOLUTION INFILTRATION; PERINEURAL AS NEEDED
Status: DISCONTINUED | OUTPATIENT
Start: 2023-05-24 | End: 2023-05-24 | Stop reason: HOSPADM

## 2023-05-24 RX ORDER — LIDOCAINE HYDROCHLORIDE 20 MG/ML
INJECTION, SOLUTION EPIDURAL; INFILTRATION; INTRACAUDAL; PERINEURAL AS NEEDED
Status: DISCONTINUED | OUTPATIENT
Start: 2023-05-24 | End: 2023-05-24 | Stop reason: SURG

## 2023-05-24 RX ORDER — ONDANSETRON 2 MG/ML
4 INJECTION INTRAMUSCULAR; INTRAVENOUS ONCE AS NEEDED
Status: DISCONTINUED | OUTPATIENT
Start: 2023-05-24 | End: 2023-05-24 | Stop reason: HOSPADM

## 2023-05-24 RX ORDER — DEXAMETHASONE SODIUM PHOSPHATE 4 MG/ML
INJECTION, SOLUTION INTRA-ARTICULAR; INTRALESIONAL; INTRAMUSCULAR; INTRAVENOUS; SOFT TISSUE AS NEEDED
Status: DISCONTINUED | OUTPATIENT
Start: 2023-05-24 | End: 2023-05-24 | Stop reason: SURG

## 2023-05-24 RX ORDER — INDOCYANINE GREEN AND WATER 25 MG
5 KIT INJECTION ONCE
Status: COMPLETED | OUTPATIENT
Start: 2023-05-24 | End: 2023-05-24

## 2023-05-24 RX ADMIN — SODIUM CHLORIDE, SODIUM GLUCONATE, SODIUM ACETATE, POTASSIUM CHLORIDE AND MAGNESIUM CHLORIDE 1000 ML: 526; 502; 368; 37; 30 INJECTION, SOLUTION INTRAVENOUS at 11:00

## 2023-05-24 RX ADMIN — SUGAMMADEX 200 MG: 100 INJECTION, SOLUTION INTRAVENOUS at 14:48

## 2023-05-24 RX ADMIN — INDOCYANINE GREEN AND WATER 5 MG: KIT at 10:59

## 2023-05-24 RX ADMIN — ONDANSETRON 4 MG: 2 INJECTION INTRAMUSCULAR; INTRAVENOUS at 13:46

## 2023-05-24 RX ADMIN — HYDROMORPHONE HYDROCHLORIDE 0.5 MG: 1 INJECTION, SOLUTION INTRAMUSCULAR; INTRAVENOUS; SUBCUTANEOUS at 15:16

## 2023-05-24 RX ADMIN — GLYCOPYRROLATE 0.2 MCG: 0.2 INJECTION, SOLUTION INTRAMUSCULAR; INTRAVITREAL at 13:31

## 2023-05-24 RX ADMIN — KETAMINE HYDROCHLORIDE 20 MG: 50 INJECTION INTRAMUSCULAR; INTRAVENOUS at 14:11

## 2023-05-24 RX ADMIN — ROCURONIUM BROMIDE 20 MG: 50 INJECTION INTRAVENOUS at 14:34

## 2023-05-24 RX ADMIN — Medication 100 MCG: at 14:24

## 2023-05-24 RX ADMIN — HYDROMORPHONE HYDROCHLORIDE 0.5 MG: 1 INJECTION, SOLUTION INTRAMUSCULAR; INTRAVENOUS; SUBCUTANEOUS at 15:25

## 2023-05-24 RX ADMIN — FENTANYL CITRATE 50 MCG: 50 INJECTION, SOLUTION INTRAMUSCULAR; INTRAVENOUS at 14:08

## 2023-05-24 RX ADMIN — FENTANYL CITRATE 50 MCG: 50 INJECTION, SOLUTION INTRAMUSCULAR; INTRAVENOUS at 14:13

## 2023-05-24 RX ADMIN — MIDAZOLAM HYDROCHLORIDE 2 MG: 1 INJECTION, SOLUTION INTRAMUSCULAR; INTRAVENOUS at 13:28

## 2023-05-24 RX ADMIN — ROCURONIUM BROMIDE 50 MG: 50 INJECTION INTRAVENOUS at 13:34

## 2023-05-24 RX ADMIN — LIDOCAINE HYDROCHLORIDE 100 MG: 20 INJECTION, SOLUTION EPIDURAL; INFILTRATION; INTRACAUDAL; PERINEURAL at 13:34

## 2023-05-24 RX ADMIN — DEXAMETHASONE SODIUM PHOSPHATE 4 MG: 4 INJECTION, SOLUTION INTRAMUSCULAR; INTRAVENOUS at 13:46

## 2023-05-24 RX ADMIN — FENTANYL CITRATE 50 MCG: 50 INJECTION, SOLUTION INTRAMUSCULAR; INTRAVENOUS at 13:34

## 2023-05-24 RX ADMIN — ROCURONIUM BROMIDE 20 MG: 50 INJECTION INTRAVENOUS at 13:58

## 2023-05-24 RX ADMIN — FENTANYL CITRATE 50 MCG: 50 INJECTION, SOLUTION INTRAMUSCULAR; INTRAVENOUS at 14:03

## 2023-05-24 RX ADMIN — PROPOFOL 200 MG: 10 INJECTION, EMULSION INTRAVENOUS at 13:34

## 2023-05-24 RX ADMIN — Medication 2 G: at 13:45

## 2023-05-24 NOTE — ANESTHESIA PREPROCEDURE EVALUATION
Anesthesia Evaluation     Patient summary reviewed and Nursing notes reviewed   no history of anesthetic complications:  NPO Solid Status: > 8 hours  NPO Liquid Status: > 2 hours           Airway   Mallampati: II  TM distance: >3 FB  Neck ROM: full  possible difficult intubation  Comment: Final Airway Details  Final airway type: endotracheal airway        Successful airway: ETT  Cuffed: yes   Successful intubation technique: direct laryngoscopy  Facilitating devices/methods: intubating stylet  Endotracheal tube insertion site: oral  Blade: Checo  Blade size: 3.5.  ETT size (mm): 7.0  Cormack-Lehane Classification: grade I - full view of glottis  Placement verified by: chest auscultation and capnometry   Cuff volume (mL): 5  Measured from: teeth  ETT/EBT  to teeth (cm): 21  Number of attempts at approach: 1  Assessment: lips, teeth, and gum same as pre-op and atraumatic intubation  Dental    (+) poor dentation    Pulmonary     breath sounds clear to auscultation  (+) a smoker (Vapes) Current Abstained day of surgery,   (-) shortness of breath  Cardiovascular - negative cardio ROS    Rhythm: regular  Rate: normal    (-) MEDINA, murmur      Neuro/Psych  (+) psychiatric history Anxiety and ADHD,    GI/Hepatic/Renal/Endo    (+) obesity,       Musculoskeletal     (+) back pain,       ROS comment: L4-5 Lumbar laminectomy 11/2021  Abdominal   (+) obese,    Substance History - negative use     OB/GYN    (+) Pregnant (7 weeks post partum; breast feeding.),         Other - negative ROS       ROS/Med Hx Other: HCG negative 5/22/23                  Anesthesia Plan    ASA 3     general     intravenous induction     Anesthetic plan, risks, benefits, and alternatives have been provided, discussed and informed consent has been obtained with: patient.  Pre-procedure education provided  Plan discussed with CRNA.        CODE STATUS:

## 2023-05-24 NOTE — OP NOTE
Operative Note    Moriah Nicole  5/24/2023    Pre-op Diagnosis:   Calculus of gallbladder without cholecystitis without obstruction [K80.20]    Post-op Diagnosis:     Post-Op Diagnosis Codes:     * Calculus of gallbladder without cholecystitis without obstruction [K80.20]    Procedure/CPT® Codes:      Procedure(s):  CHOLECYSTECTOMY LAPAROSCOPIC WITH DAVINCI ROBOT    Surgeon(s):  Stanislaw Jaramillo MD    Anesthesia: General    Staff:   Circulator: Bertrand Pritchard RN  Scrub Person: Misty Vanessa RN  Assistant: Chey Linn    Estimated Blood Loss: minimal    Specimens:                ID Type Source Tests Collected by Time   A (Not marked as sent) : gallbladder and contents Tissue Gallbladder TISSUE EXAM, [facility] ([facility]) Stanislaw Jaramillo MD 5/24/2023 1342         Drains: * No LDAs found *    Findings: cholecystitis and cholelithiasis    Complications: none    Indication: 22 yo woman with intermittent right upper quadrant pain with gallstones who presents for cholecystectomy.    Operative Note:    The patient was brought to the operating room and placed supine on the operating table. After adequate general endotracheal anesthesia, the abdominal area was prepped and draped in a sterile manner. A briefing and timeout were performed and all parties were in agreement.     A left subcostal transverse incision was made in the midclavicular line approximately 2cm caudally from the costal margin. A 8 mm robotic optical trocar was uneventfully passed into the abdomen under direct vision with no visceral injury. The abdomen was insufflated to a total of 15 mmHg, 4.8 L of CO2. A 5 mm laparoscope revealed an excellent view of the upper and lower abdominal areas. Three additional robotic trocars were then placed under direct visualization triangulating the gallbladder in the right upper quadrant. The bed was then tilted in reverse Trendelenburg and tipped to the left. The patient tolerated the positioning and  insufflation without difficulty. The robot was then docked.    The gallbladder was retracted upwards and outwards by grasping the top and the infundibulum of the gallbladder with a ProGrasp passed through the 4th port. The peritoneum around the infundibulum was taken down for a distance of 1/3 of the length of the gallbladder on the anterior and posterior sides. The cystic duct and artery easily came into view as did the 5th segment of the liver behind these structures.     The cystic duct was doubly clipped and divided. The cystic artery was cauterized with the bipolar energy device. The gallbladder was then dissected out of the liver bed using electrocautery. Once it had been nearly completely excised, pressure in the abdomen was reduced to 8 mmHg with no further bleeding being noted from the liver bed. The remainder of the gallbladder was dissected free.It was brought out intact through one of the robotic ports    All areas were visualized for bleeding and bile leak. None was seen. The patient was then placed supine.  All trocars were removed and the abdomen was allowed to flatten. All port sites were closed with 4-0 subcuticular on the skin.    The procedure was terminated. It was well tolerated. Sponge and needle counts were correct, and the patient was transferred to recovery in satisfactory condition.        Assistant: Chey Linn was responsible for performing the following activities: Retraction, Suction, Irrigation, Suturing, Closing and Placing Dressing and their skilled assistance was necessary for the success of this case.    Stanislaw Jaramillo MD     Date: 5/24/2023  Time: 14:53 CDT

## 2023-05-24 NOTE — INTERVAL H&P NOTE
H&P reviewed. The patient was examined and there are no changes to the H&P.      Vitals:    05/24/23 1052   BP: 106/74   Pulse: 55   Resp: 18   Temp: 96.9 °F (36.1 °C)   SpO2: 97%      I have counseled the patient about the nature of the problem, the natural history of the disease, the risks and benefits of surgery, the expected recovery, and the alternatives to surgery.  After this discussion, they were given an opportunity to ask questions, have an understanding of diagnosis and treatment options, and wish to proceed with surgery.

## 2023-05-24 NOTE — ANESTHESIA PROCEDURE NOTES
Airway  Urgency: elective    Date/Time: 5/24/2023 1:41 PM  End Time:5/24/2023 1:41 PM  Airway not difficult    General Information and Staff    Patient location during procedure: OR  ADELA: Lester Patel SRNA  Indications and Patient Condition  Indications for airway management: airway protection    Preoxygenated: yes  MILS maintained throughout  Mask difficulty assessment: 2 - vent by mask + OA or adjuvant +/- NMBA    Final Airway Details  Final airway type: endotracheal airway      Successful airway: ETT  Cuffed: yes   Successful intubation technique: direct laryngoscopy  Facilitating devices/methods: intubating stylet  Endotracheal tube insertion site: oral  Blade: Checo  Blade size: 3  ETT size (mm): 7.0  Cormack-Lehane Classification: grade IIa - partial view of glottis  Placement verified by: chest auscultation and capnometry   Cuff volume (mL): 7  Measured from: lips  ETT/EBT  to lips (cm): 22  Number of attempts at approach: 1  Assessment: lips, teeth, and gum same as pre-op and atraumatic intubation

## 2023-05-24 NOTE — ANESTHESIA POSTPROCEDURE EVALUATION
"Patient: Moriah Nicole    Procedure Summary     Date: 05/24/23 Room / Location: [facility] OR 03 / [facility] OR    Anesthesia Start: 1327 Anesthesia Stop: 1503    Procedure: CHOLECYSTECTOMY LAPAROSCOPIC WITH DAVINCI ROBOT (Abdomen) Diagnosis:       Calculus of gallbladder without cholecystitis without obstruction      (Calculus of gallbladder without cholecystitis without obstruction [K80.20])    Surgeons: Stanislaw Jaramillo MD Provider: Madison Zhao CRNA    Anesthesia Type: general ASA Status: 3          Anesthesia Type: general    Vitals  No vitals data found for the desired time range.          Post Anesthesia Care and Evaluation    Patient location during evaluation: PACU  Patient participation: complete - patient participated  Level of consciousness: sleepy but conscious  Pain score: 0  Pain management: adequate    Airway patency: patent  Anesthetic complications: No anesthetic complications  PONV Status: none  Cardiovascular status: acceptable and hemodynamically stable  Respiratory status: acceptable  Hydration status: acceptable    Comments: /74   Pulse 104   Temp 96.9 °F (36.1 °C) (Temporal)   Resp 18   Ht 161.3 cm (63.5\")   Wt 81.6 kg (179 lb 14.3 oz)   LMP  (LMP Unknown)   SpO2 97%   BMI 31.37 kg/m²         "

## 2023-05-30 LAB — REF LAB TEST METHOD: NORMAL

## 2023-06-12 ENCOUNTER — OFFICE VISIT (OUTPATIENT)
Dept: SURGERY | Facility: CLINIC | Age: 23
End: 2023-06-12
Payer: COMMERCIAL

## 2023-06-12 VITALS
TEMPERATURE: 97.4 F | HEIGHT: 64 IN | BODY MASS INDEX: 30.63 KG/M2 | OXYGEN SATURATION: 98 % | WEIGHT: 179.4 LBS | SYSTOLIC BLOOD PRESSURE: 124 MMHG | HEART RATE: 88 BPM | DIASTOLIC BLOOD PRESSURE: 72 MMHG

## 2023-06-12 DIAGNOSIS — K80.20 CALCULUS OF GALLBLADDER WITHOUT CHOLECYSTITIS WITHOUT OBSTRUCTION: ICD-10-CM

## 2023-06-12 PROCEDURE — 99024 POSTOP FOLLOW-UP VISIT: CPT | Performed by: STUDENT IN AN ORGANIZED HEALTH CARE EDUCATION/TRAINING PROGRAM

## 2023-06-12 RX ORDER — HYDROCODONE BITARTRATE AND ACETAMINOPHEN 5; 325 MG/1; MG/1
1 TABLET ORAL EVERY 6 HOURS PRN
Qty: 24 TABLET | Refills: 0 | Status: SHIPPED | OUTPATIENT
Start: 2023-06-12

## 2023-06-12 RX ORDER — BACLOFEN 10 MG/1
10 TABLET ORAL DAILY
Qty: 30 TABLET | Refills: 1 | Status: SHIPPED | OUTPATIENT
Start: 2023-06-12 | End: 2024-06-11

## 2023-06-19 NOTE — PROGRESS NOTES
CHIEF COMPLAINT:   Chief Complaint   Patient presents with    Post-op Follow-up     Robotic Cholecystectomy        HPI: This patient presents for a post-operative visit after undergoing a Robotic assited  cholecystectomy.  Patient reports  she is still having some pain and swelling at her port sites. She feels that she may have done to much once she got home. She has no nausea or vomiting and is tolerating a regular diet.       PHYSICAL EXAM:    ABD: Incisions are healing well without any erythema or signs of infection. Some swelling in the right lower quadrant    ASSESSMENT:    Diagnoses and all orders for this visit:    1. Calculus of gallbladder without cholecystitis without obstruction  -     HYDROcodone-acetaminophen (NORCO) 5-325 MG per tablet; Take 1 tablet by mouth Every 6 (Six) Hours As Needed (Pain) for up to 24 doses.  Dispense: 24 tablet; Refill: 0    Other orders  -     baclofen (LIORESAL) 10 MG tablet; Take 1 tablet by mouth Daily.  Dispense: 30 tablet; Refill: 1        PLAN:    I have given her an additional few days of pain medicine and have added some baclofen as well. I reassured her that this is likely normal postoperative pain and there is no evidence of a serious postoperative issue. If she continues to have pain I have asked her to call and we will get a scan of her abdomen.         This document has been electronically signed by Stanislaw Jaramillo MD on June 19, 2023 09:32 CDT

## 2023-07-27 ENCOUNTER — OFFICE VISIT (OUTPATIENT)
Dept: OBSTETRICS AND GYNECOLOGY | Facility: CLINIC | Age: 23
End: 2023-07-27
Payer: COMMERCIAL

## 2023-07-27 VITALS
SYSTOLIC BLOOD PRESSURE: 108 MMHG | BODY MASS INDEX: 29.02 KG/M2 | HEIGHT: 64 IN | DIASTOLIC BLOOD PRESSURE: 60 MMHG | WEIGHT: 170 LBS

## 2023-07-27 DIAGNOSIS — L65.9 HAIR LOSS: ICD-10-CM

## 2023-07-27 DIAGNOSIS — Z30.09 BIRTH CONTROL COUNSELING: ICD-10-CM

## 2023-07-27 DIAGNOSIS — N92.1 MENORRHAGIA WITH IRREGULAR CYCLE: Primary | ICD-10-CM

## 2023-07-27 RX ORDER — UREA 10 %
LOTION (ML) TOPICAL
COMMUNITY

## 2023-07-27 RX ORDER — NORETHINDRONE ACETATE AND ETHINYL ESTRADIOL AND FERROUS FUMARATE 1MG-20(24)
1 KIT ORAL DAILY
Qty: 28 TABLET | Refills: 12 | Status: SHIPPED | OUTPATIENT
Start: 2023-07-27 | End: 2024-07-26

## 2023-07-27 NOTE — PROGRESS NOTES
Subjective   Moriah Nicole is a 23 y.o. Change birth control     History of Present Illness  LMP: current    Patient presents today for continuous bleeding and hair loss. Reports since she started the Depo shot she has had almost continuous bleeding. Having to change her pad every 1-2hr. She also reports hair loss, black circles under her eyes and teeth pain. She would like to switch back to the ocp.        Contraception  This is a new problem. Pertinent negatives include no abdominal pain, chest pain, chills, coughing, fatigue, fever, headaches, nausea, urinary symptoms or vomiting. Nothing aggravates the symptoms. She has tried nothing for the symptoms.     The following portions of the patient's history were reviewed and updated as appropriate: allergies, current medications, past family history, past medical history, past social history, past surgical history, and problem list.    Review of Systems   Constitutional:  Negative for appetite change, chills, fatigue and fever.   Respiratory:  Negative for apnea, cough, choking, chest tightness, shortness of breath, wheezing and stridor.    Cardiovascular:  Negative for chest pain, palpitations and leg swelling.   Gastrointestinal:  Negative for abdominal pain, constipation, diarrhea, nausea and vomiting.   Genitourinary:  Positive for menstrual problem. Negative for amenorrhea, breast discharge, breast lump, breast pain, decreased libido, decreased urine volume, difficulty urinating, dyspareunia, dysuria, flank pain, frequency, genital sores, hematuria, pelvic pain, pelvic pressure, urgency, urinary incontinence, vaginal bleeding, vaginal discharge and vaginal pain.     Objective   Physical Exam  Vitals reviewed.   Constitutional:       General: She is awake. She is not in acute distress.     Appearance: Normal appearance. She is well-developed, well-groomed and normal weight. She is not ill-appearing, toxic-appearing or diaphoretic.   Cardiovascular:       Rate and Rhythm: Normal rate and regular rhythm.      Pulses: Normal pulses.      Heart sounds: Normal heart sounds.   Pulmonary:      Effort: Pulmonary effort is normal.      Breath sounds: Normal breath sounds.   Abdominal:      General: Bowel sounds are normal.   Skin:     General: Skin is warm and dry.   Neurological:      Mental Status: She is alert and easily aroused.   Psychiatric:         Behavior: Behavior is cooperative.         Assessment & Plan   Diagnoses and all orders for this visit:    1. Menorrhagia with irregular cycle (Primary)    2. Birth control counseling  -     norethindrone-ethinyl estradiol-ferrous fumarate (LOESTIN 24 FE) 1-20 MG-MCG(24) per tablet; Take 1 tablet by mouth Daily.  Dispense: 28 tablet; Refill: 12    3. Hair loss    Discussed with pt that irregular bleeding is common when first starting Depo. Usually with continued doses, most women experience lighter periods or amenorrhea.   Hair loss is also common in the post-bita period. Recommend hair, skin and nail vitamins.  Pt would like to change to OCP.  Recommend taking the pill every day around the same time every day. If you miss a dose, take the missed dose as soon as you remember. Recommend condom use with all sexual encounters.         This document has been electronically signed by DAVID David on 2023 15:44 CDT

## 2023-12-29 ENCOUNTER — TELEPHONE (OUTPATIENT)
Dept: NEUROSURGERY | Facility: CLINIC | Age: 23
End: 2023-12-29
Payer: MEDICAID

## 2023-12-29 NOTE — TELEPHONE ENCOUNTER
Caller: Moriah Nicole    Relationship: Self    Best call back number: [phone] (home)     What form or medical record are you requesting: MEDICAL CLEARANCE LETTER- RESTRICTIONS-    Who is requesting this form or medical record from you: EMPLOYER    How would you like to receive the form or medical records (pick-up, mail, fax): FAX  If fax, what is the fax number: [phone]    Timeframe paperwork needed: ASAP     PLEASE ADVISE- THANK YOU

## 2023-12-29 NOTE — TELEPHONE ENCOUNTER
Patient has not been seen in 2yrs. Is wanting something showing no restrictions at that time. Advised the only thing I could give her was a copy of her last office note, she said that would be fine. I have copied and pasted that into a letter which she can access in her Mychart.

## (undated) DEVICE — PK TURNOVER RM ADV

## (undated) DEVICE — ANTIBACTERIAL VIOLET BRAIDED (POLYGLACTIN 910), SYNTHETIC ABSORBABLE SUTURE: Brand: COATED VICRYL

## (undated) DEVICE — ELECTRD BLD EZ CLN MOD XLNG 2.75IN

## (undated) DEVICE — SPK10277 JACKSON/PRO-AXIS KIT: Brand: SPK10277 JACKSON/PRO-AXIS KIT

## (undated) DEVICE — 4.0MM PRECISION ROUND

## (undated) DEVICE — CONN FLX BREATHE CIRCT

## (undated) DEVICE — GLV SURG BIOGEL LTX PF 8

## (undated) DEVICE — DISPOSABLE IRRIGATION CASSETTE: Brand: CORE

## (undated) DEVICE — SUT VIC 0 MO4 CR8 18IN VCP701D

## (undated) DEVICE — VAGINAL PREP TRAY: Brand: MEDLINE INDUSTRIES, INC.

## (undated) DEVICE — DRP C/ARMOR

## (undated) DEVICE — PROBE 8225101 5PK STD PRASS FL TIP ROHS

## (undated) DEVICE — GLV SURG DERMASSURE GRN LF PF 8.0

## (undated) DEVICE — GLV SURG BIOGEL LTX PF 6 1/2

## (undated) DEVICE — SHEET, T, LAPAROTOMY, STERILE: Brand: MEDLINE

## (undated) DEVICE — NEEDLE, QUINCKE 25GX3.5": Brand: MEDLINE

## (undated) DEVICE — CLTH CLENS READYCLEANSE PERI CARE PK/5

## (undated) DEVICE — SUT MNCRYL 4/0 PS2 27IN UD MCP426H

## (undated) DEVICE — CATH IV ANGIO FEP 12G 3IN LTBLU 10PK

## (undated) DEVICE — DRP TABL BK STERILEZ ZFLD

## (undated) DEVICE — PROXIMATE RH ROTATING HEAD SKIN STAPLERS (35 WIDE) CONTAINS 35 STAINLESS STEEL STAPLES: Brand: PROXIMATE

## (undated) DEVICE — ELECTRD BLD EZ CLN MOD 4IN

## (undated) DEVICE — PK SPINE POST 30